# Patient Record
Sex: MALE | Race: WHITE | Employment: OTHER | URBAN - METROPOLITAN AREA
[De-identification: names, ages, dates, MRNs, and addresses within clinical notes are randomized per-mention and may not be internally consistent; named-entity substitution may affect disease eponyms.]

---

## 2017-01-05 NOTE — PROGRESS NOTES
64 y.o. WHITE OR  male who presents for RPE. His wife is here with him today    He seems to be doing very well. Denies any cardiovascular complaints. Stopped working and he's going to [employer] 1-2x/week and walking, doing the machines, etc.  Weight is creeping up though, could do better w portions     Vitals 1/9/2017 10/18/2016 12/29/2015 12/24/2015 12/8/2015   Weight 203 lb 3.2 oz 197 lb 12.8 oz  192 lb 198 lb     Vitals 11/13/2015 10/7/2015 10/27/2014   Weight 198 lb 3.2 oz 190 lb 190 lb     Denies polyuria, polydipsia, nocturia, vision change. Not checking sugars at this time. No  complaints. He has been having fairly frequent heartburn the last couple weeks, using antacid prn. No alarm complaints. Remains on mobic just about daily for his aches.   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 Phyllis Hughes liver 11/00     Fib-4 was 0.54 from 10/14    GERD (gastroesophageal reflux disease)      and HH on UGI 11/00    Hip bursitis      Dr Dayron Castellanos left 2015    Prediabetes 2011     Rhinophyma     S/P cardiac cath 2000     nl, EF 60% Dr. Monty Love Umbilical hernia      Past Surgical History   Procedure Laterality Date    Hx colonoscopy       Dr. Yessenia Mojica 10/10 diverticulosis    Hx orthopaedic  2012     Dr. 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[employer]      Social History Main Topics    Smoking status: Former Smoker     Quit date: 1/1/2005    Smokeless tobacco: Never Used      Comment: 60+ py    Alcohol use No    Drug use: No    Sexual activity: Not on file     Other Topics Concern    Not on file     Social History Narrative Family History   Problem Relation Age of Onset    Other Father      cardiomyopathy    Heart Disease Brother      Immunization History   Administered Date(s) Administered    Influenza Vaccine 12/17/2012    Influenza Vaccine (Quad) PF 10/07/2015, 10/18/2016    Influenza Vaccine PF 10/25/2013, 10/27/2014    Influenza Vaccine Whole 12/19/2011    TD Vaccine 07/01/2005    Tdap 01/09/2017    Zoster Vaccine, Live 11/06/2013     Current Outpatient Prescriptions   Medication Sig    omeprazole (PRILOSEC) 40 mg capsule Take 1 Cap by mouth daily.  celecoxib (CELEBREX) 200 mg capsule Take 1 Cap by mouth daily.  ascorbic acid (VITAMIN C) 500 mg tablet Take  by mouth.  multivitamin (ONE A DAY) tablet Take 1 Tab by mouth daily.  cyanocobalamin 1,000 mcg tablet Take 1,000 mcg by mouth daily.  diclofenac (VOLTAREN) 1 % topical gel Apply 4 grams to affected joint up to 4 times per day, maximum 16 grams per joint per day  Dispense 5 100 gram tubes     No current facility-administered medications for this visit.       Allergies   Allergen Reactions    Cymbalta [Duloxetine] Other (comments)     [place] funny       REVIEW OF SYSTEMS: colo 10/10 Dr Barrios Player no vision change or eye pain  Oral  no mouth pain, tongue or tooth problems  Ears  no hearing loss, ear pain, fullness, no swallowing problems  Cardiac  no CP, PND, orthopnea, edema, palpitations or syncope  Chest  no breast masses  Resp  no wheezing, chronic coughing, dyspnea  GI  no nausea, vomiting, change in bowel habits, bleeding, hemorrhoids  Urinary  no dysuria, hematuria, flank pain, urgency, frequency  Genitals  no genital lesions, discharge, masses, ulceration, warts  Ortho  no swelling, dec ROM, myalgias  Derm  no nail abnormalities, rashes, lesions of note, hair loss  Psych  denies any anxiety or depression symptoms, no hallucinations or violent ideation  Constitutional  no wt loss, night sweats, unexplained fevers  Neuro  no focal weakness, numbness, paresthesias, incoordination, ataxia, involuntary movements  Endo - no polyuria, polydipsia, nocturia, hot flashes    Visit Vitals    /90 (BP 1 Location: Left arm, BP Patient Position: Sitting)    Pulse 68    Temp 97.7 °F (36.5 °C)    Ht 5' 10\" (1.778 m)    Wt 203 lb 3.2 oz (92.2 kg)    BMI 29.16 kg/m2     Affect is appropriate. Mood stable  No apparent distress  HEENT --Anicteric sclerae, tympanic membranes normal,  ear canals normal.  PERRL, EOMI, conjunctiva and lids normal.  Disks were sharp  Sinuses were nontender, turbinates normal, hearing normal.  Oropharynx without  erythema, normal tongue, oral mucosa and tonsils. No thyromegaly, JVD, or bruits. Lungs --Clear to auscultation and percussion, normal percussion. Heart --Regular rate and rhythm, no murmurs, rubs, gallops, or clicks. Chest wall --Nontender to palpation. PMI normal.  Abdomen -- Soft and nontender, no hepatosplenomegaly or masses. Umbilical hernia noted    -- normal penis, no scrotal masses, testicular tenderness or hernias  Prostate  -- no asymmetry, nodularity, tenderness or enlargement  Rectal  -- normal tone, guiaiac negative brown stool  Extremities -- Without cyanosis, clubbing, edema. 2+ pulses equally and bilaterally.     LABS  From 12/10 showed gluc 118, cr 1.10,             alt 51, hba1c 6.1,       wbc 7.9, hb 16.0, plt 261, psa 1.2, ua neg  From 7/11 showed                                         2hr GTT 58  From 5/12 showed   gluc 114, cr 1.10, gfr>60,                       wbc 7.0, hb 15.2, plt 240  From 10/13 showed gluc 111, cr 0.92, gfr 91,  alt 30,          chol 169, tg 47, hdl 51, ldl-c 109, wbc 5.8, hb 15.2, plt 226, psa 1.2  From 10/14 showed gluc 102, cr 1.04, gfr>60, alt 31,          chol 177, tg 97, hdl 45, ldl-c 113, wbc 7.0, hb 15.5, plt 289, k 5.9  From 10/14 showed        hba1c 6.3  From 12/15 showed gluc 110, cr 1.17, gfr>60, alt 52, hba1c 6.2, chol 179, tg 79, hdl 53, ldl-c 110, wbc 5.8, hb 16.6, plt 244, ua neg    Results for orders placed or performed during the hospital encounter of 12/30/16   CBC W/O DIFF   Result Value Ref Range    WBC 6.6 4.6 - 13.2 K/uL    RBC 4.95 4.70 - 5.50 M/uL    HGB 16.0 13.0 - 16.0 g/dL    HCT 47.8 36.0 - 48.0 %    MCV 96.6 74.0 - 97.0 FL    MCH 32.3 24.0 - 34.0 PG    MCHC 33.5 31.0 - 37.0 g/dL    RDW 13.1 11.6 - 14.5 %    PLATELET 179 869 - 584 K/uL    MPV 9.5 9.2 - 11.8 FL   LIPID PANEL   Result Value Ref Range    LIPID PROFILE          Cholesterol, total 165 <200 MG/DL    Triglyceride 177 (H) <150 MG/DL    HDL Cholesterol 48 40 - 60 MG/DL    LDL, calculated 81.6 0 - 100 MG/DL    VLDL, calculated 35.4 MG/DL    CHOL/HDL Ratio 3.4 0 - 5.0     METABOLIC PANEL, COMPREHENSIVE   Result Value Ref Range    Sodium 140 136 - 145 mmol/L    Potassium 4.5 3.5 - 5.5 mmol/L    Chloride 107 100 - 108 mmol/L    CO2 27 21 - 32 mmol/L    Anion gap 6 3.0 - 18 mmol/L    Glucose 116 (H) 74 - 99 mg/dL    BUN 26 (H) 7.0 - 18 MG/DL    Creatinine 0.98 0.6 - 1.3 MG/DL    BUN/Creatinine ratio 27 (H) 12 - 20      GFR est AA >60 >60 ml/min/1.73m2    GFR est non-AA >60 >60 ml/min/1.73m2    Calcium 8.7 8.5 - 10.1 MG/DL    Bilirubin, total 0.4 0.2 - 1.0 MG/DL    ALT 67 (H) 16 - 61 U/L    AST 27 15 - 37 U/L    Alk.  phosphatase 80 45 - 117 U/L    Protein, total 6.9 6.4 - 8.2 g/dL    Albumin 4.2 3.4 - 5.0 g/dL    Globulin 2.7 2.0 - 4.0 g/dL    A-G Ratio 1.6 0.8 - 1.7     URINALYSIS W/ RFLX MICROSCOPIC   Result Value Ref Range    Color YELLOW      Appearance CLEAR      Specific gravity 1.025 1.005 - 1.030      pH (UA) 6.5 5.0 - 8.0      Protein NEGATIVE  NEG mg/dL    Glucose NEGATIVE  NEG mg/dL    Ketone NEGATIVE  NEG mg/dL    Bilirubin NEGATIVE  NEG      Blood NEGATIVE  NEG      Urobilinogen 1.0 0.2 - 1.0 EU/dL    Nitrites NEGATIVE  NEG      Leukocyte Esterase NEGATIVE  NEG       Calculated 10 year risk score was 8.4%    Patient Active Problem List   Diagnosis Code    Prediabetes 2011 R73.03    Arthritis Dr Amparo Salomon M19.90    GERD without esophagitis K21.9    Diverticulosis of colon without hemorrhage K57.30     Assessment and plan:  1. GERD and dyspepsia. Indefinite ppi w the dyspepsia  2. Ortho. Will change to celebrex for gi protection, ortho as needed  3. Diverticulosis. Fiber, colo 2020  4. Prediabetes. Continue lifestyle and dietary measures. Previous long discussion about typical progression of preDM. Wt loss  5. Immunizations. Tdap given  6. Lipids. Lifestyle and dietary modification, mediterranean diet sheet already given to his wife earlier today        RPE 1/18    Above conditions discussed at length and patient vocalized understanding.   All questions answered to patient satifaction

## 2017-01-09 ENCOUNTER — HOSPITAL ENCOUNTER (OUTPATIENT)
Dept: LAB | Age: 62
Discharge: HOME | End: 2017-01-09
Payer: COMMERCIAL

## 2017-01-09 ENCOUNTER — OFFICE VISIT (OUTPATIENT)
Dept: INTERNAL MEDICINE CLINIC | Age: 62
End: 2017-01-09

## 2017-01-09 VITALS
SYSTOLIC BLOOD PRESSURE: 130 MMHG | HEART RATE: 68 BPM | TEMPERATURE: 97.7 F | HEIGHT: 70 IN | BODY MASS INDEX: 29.09 KG/M2 | WEIGHT: 203.2 LBS | DIASTOLIC BLOOD PRESSURE: 90 MMHG

## 2017-01-09 DIAGNOSIS — Z00.00 PHYSICAL EXAM: Primary | ICD-10-CM

## 2017-01-09 DIAGNOSIS — R73.03 PREDIABETES: ICD-10-CM

## 2017-01-09 DIAGNOSIS — K57.30 DIVERTICULOSIS OF COLON WITHOUT HEMORRHAGE: ICD-10-CM

## 2017-01-09 DIAGNOSIS — K21.9 GERD WITHOUT ESOPHAGITIS: ICD-10-CM

## 2017-01-09 DIAGNOSIS — Z00.00 PHYSICAL EXAM: ICD-10-CM

## 2017-01-09 DIAGNOSIS — Z23 ENCOUNTER FOR IMMUNIZATION: ICD-10-CM

## 2017-01-09 DIAGNOSIS — M19.90 ARTHRITIS: ICD-10-CM

## 2017-01-09 LAB
EST. AVERAGE GLUCOSE BLD GHB EST-MCNC: 134 MG/DL
HBA1C MFR BLD: 6.3 % (ref 4.2–5.6)

## 2017-01-09 PROCEDURE — 83036 HEMOGLOBIN GLYCOSYLATED A1C: CPT | Performed by: INTERNAL MEDICINE

## 2017-01-09 RX ORDER — CELECOXIB 200 MG/1
200 CAPSULE ORAL DAILY
Qty: 90 CAP | Refills: 3 | Status: SHIPPED | OUTPATIENT
Start: 2017-01-09 | End: 2018-01-13 | Stop reason: SDUPTHER

## 2017-01-09 RX ORDER — OMEPRAZOLE 40 MG/1
40 CAPSULE, DELAYED RELEASE ORAL DAILY
Qty: 90 CAP | Refills: 3 | Status: SHIPPED | OUTPATIENT
Start: 2017-01-09 | End: 2018-01-13 | Stop reason: SDUPTHER

## 2017-01-09 NOTE — MR AVS SNAPSHOT
Visit Information Date & Time Provider Department Dept. Phone Encounter #  
 1/9/2017 10:30 AM Ita Do MD [facility] [phone] [id number] Your Appointments 1/9/2017 10:30 AM  
PHYSICAL with Ita Do MD  
[facility] of [facility]) Appt Note: [address]; [address]  
 [address] [facility] [place], [address] [address] [address] [place]  
  
   
 [address] [facility], [address] Upcoming Health Maintenance Date Due DTaP/Tdap/Td series (1 - Tdap) 7/2/2005 COLONOSCOPY 10/25/2020 Allergies as of 1/9/2017  Review Complete On: 1/9/2017 By: Mica Godfrey No Known Allergies Current Immunizations  Reviewed on 10/18/2016 Name Date Influenza Vaccine 12/17/2012 Influenza Vaccine (Quad) PF 10/18/2016, 10/7/2015 Influenza Vaccine PF 10/27/2014, 10/25/2013 Influenza Vaccine Whole 12/19/2011 TD Vaccine 7/1/2005 Zoster Vaccine, Live 11/6/2013  4:10 PM  
  
 Not reviewed this visit Vitals BP Pulse Temp Height(growth percentile) Weight(growth percentile) BMI  
 130/90 (BP 1 Location: Left arm, BP Patient Position: Sitting) 68 97.7 °F (36.5 °C) 5' 10\" (1.778 m) 203 lb 3.2 oz (92.2 kg) 29.16 kg/m2 Smoking Status Former Smoker Vitals History BMI and BSA Data Body Mass Index Body Surface Area  
 29.16 kg/m 2 2.13 m 2 Preferred Pharmacy Pharmacy Name Phone [facility] [address], [address] [place] [address] Road [phone] Your Updated Medication List  
  
   
This list is accurate as of: 1/9/17 10:07 AM.  Always use your most recent med list.  
  
  
  
  
 ascorbic acid (vitamin C) 500 mg tablet Commonly known as:  VITAMIN C Take  by mouth.  
  
 cyanocobalamin 1,000 mcg tablet Take 1,000 mcg by mouth daily. diclofenac 1 % Gel Commonly known as:  VOLTAREN  
 Apply 4 grams to affected joint up to 4 times per day, maximum 16 grams per joint per day Dispense 5 100 gram tubes DULoxetine 30 mg capsule Commonly known as:  CYMBALTA Take 1 Cap by mouth daily. meloxicam 15 mg tablet Commonly known as:  MOBIC  
TAKE ONE TABLET BY MOUTH DAILY AS NEEDED  
  
 multivitamin tablet Commonly known as:  ONE A DAY Take 1 Tab by mouth daily. Introducing [facility]! Harry Suarez introduces [employer] patient portal. Now you can access parts of your medical record, email your doctor's office, and request medication refills online. 1. In your internet browser, go to [email or web address] 2. Click on the First Time User? Click Here link in the Sign In box. You will see the New Member Sign Up page. 3. Enter your [employer] Access Code exactly as it appears below. You will not need to use this code after youve completed the sign-up process. If you do not sign up before the expiration date, you must request a new code. · [employer] Access Code: [email or web address] Expires: 1/16/2017  1:11 PM 
 
4. Enter the last four digits of your Social Security Number (xxxx) and Date of Birth (mm/dd/yyyy) as indicated and click Submit. You will be taken to the next sign-up page. 5. Create a [employer] ID. This will be your [employer] login ID and cannot be changed, so think of one that is secure and easy to remember. 6. Create a [employer] password. You can change your password at any time. 7. Enter your Password Reset Question and Answer. This can be used at a later time if you forget your password. 8. Enter your e-mail address. You will receive e-mail notification when new information is available in [address] 9. Click Sign Up. You can now view and download portions of your medical record. 10. Click the Download Summary menu link to download a portable copy of your medical information. If you have questions, please visit the Frequently Asked Questions section of the [employer] website. Remember, [employer] is NOT to be used for urgent needs. For medical emergencies, dial 911. Now available from your iPhone and Android! Please provide this summary of care documentation to your next provider. Your primary care clinician is listed as Tiana Chinchilla. If you have any questions after today's visit, please call [phone].

## 2017-01-25 ENCOUNTER — TELEPHONE (OUTPATIENT)
Dept: INTERNAL MEDICINE CLINIC | Age: 62
End: 2017-01-25

## 2017-01-25 NOTE — TELEPHONE ENCOUNTER
Pt called and stated tht you were supposed to update a letter from 10/18/16 and change the dates, he would like to  asap, RD

## 2017-04-24 ENCOUNTER — TELEPHONE (OUTPATIENT)
Dept: INTERNAL MEDICINE CLINIC | Age: 62
End: 2017-04-24

## 2017-04-24 DIAGNOSIS — K42.9 UMBILICAL HERNIA WITHOUT OBSTRUCTION AND WITHOUT GANGRENE: Primary | ICD-10-CM

## 2017-04-24 NOTE — TELEPHONE ENCOUNTER
PT needs an updated letter for his work. The letter should be for May, June and July to be out of work. Call when ready to .

## 2017-07-25 ENCOUNTER — TELEPHONE (OUTPATIENT)
Dept: INTERNAL MEDICINE CLINIC | Age: 62
End: 2017-07-25

## 2017-07-25 NOTE — TELEPHONE ENCOUNTER
Milo Pascal is calling requesting an updated letter for Mr Milo Pascal to cover his sick leave for the next 3 months starting 8/3/17.

## 2017-07-31 NOTE — TELEPHONE ENCOUNTER
Pt spouse is calling back to see if that letter could be completed today, because it must be turned in tomorrow or Wednesday.

## 2017-10-25 ENCOUNTER — TELEPHONE (OUTPATIENT)
Dept: INTERNAL MEDICINE CLINIC | Age: 62
End: 2017-10-25

## 2017-10-25 NOTE — TELEPHONE ENCOUNTER
Patient wife calling to see if the letter on file for her  can be updated with the dates Nov 2nd-Feb 2nd.      Pls Advise

## 2018-01-12 ENCOUNTER — HOSPITAL ENCOUNTER (OUTPATIENT)
Dept: LAB | Age: 63
Discharge: HOME | End: 2018-01-12
Payer: COMMERCIAL

## 2018-01-12 LAB
ALBUMIN SERPL-MCNC: 4.1 G/DL (ref 3.4–5)
ALBUMIN/GLOB SERPL: 1.5 {RATIO} (ref 0.8–1.7)
ALP SERPL-CCNC: 75 U/L (ref 45–117)
ALT SERPL-CCNC: 64 U/L (ref 16–61)
ANION GAP SERPL CALC-SCNC: 6 MMOL/L (ref 3–18)
AST SERPL-CCNC: 22 U/L (ref 15–37)
BILIRUB SERPL-MCNC: 0.6 MG/DL (ref 0.2–1)
BUN SERPL-MCNC: 23 MG/DL (ref 7–18)
BUN/CREAT SERPL: 24 (ref 12–20)
CALCIUM SERPL-MCNC: 8.8 MG/DL (ref 8.5–10.1)
CHLORIDE SERPL-SCNC: 109 MMOL/L (ref 100–108)
CHOLEST SERPL-MCNC: 128 MG/DL
CO2 SERPL-SCNC: 28 MMOL/L (ref 21–32)
CREAT SERPL-MCNC: 0.95 MG/DL (ref 0.6–1.3)
ERYTHROCYTE [DISTWIDTH] IN BLOOD BY AUTOMATED COUNT: 13.6 % (ref 11.6–14.5)
EST. AVERAGE GLUCOSE BLD GHB EST-MCNC: 128 MG/DL
GLOBULIN SER CALC-MCNC: 2.7 G/DL (ref 2–4)
GLUCOSE SERPL-MCNC: 109 MG/DL (ref 74–99)
HBA1C MFR BLD: 6.1 % (ref 4.2–5.6)
HCT VFR BLD AUTO: 47.7 % (ref 36–48)
HDLC SERPL-MCNC: 44 MG/DL (ref 40–60)
HDLC SERPL: 2.9 {RATIO} (ref 0–5)
HGB BLD-MCNC: 15.7 G/DL (ref 13–16)
LDLC SERPL CALC-MCNC: 63.4 MG/DL (ref 0–100)
LIPID PROFILE,FLP: NORMAL
MCH RBC QN AUTO: 32 PG (ref 24–34)
MCHC RBC AUTO-ENTMCNC: 32.9 G/DL (ref 31–37)
MCV RBC AUTO: 97.3 FL (ref 74–97)
PLATELET # BLD AUTO: 238 K/UL (ref 135–420)
PMV BLD AUTO: 9.8 FL (ref 9.2–11.8)
POTASSIUM SERPL-SCNC: 5.1 MMOL/L (ref 3.5–5.5)
PROT SERPL-MCNC: 6.8 G/DL (ref 6.4–8.2)
RBC # BLD AUTO: 4.9 M/UL (ref 4.7–5.5)
SODIUM SERPL-SCNC: 143 MMOL/L (ref 136–145)
TRIGL SERPL-MCNC: 103 MG/DL (ref ?–150)
TSH SERPL DL<=0.05 MIU/L-ACNC: 1.39 UIU/ML (ref 0.36–3.74)
VLDLC SERPL CALC-MCNC: 20.6 MG/DL
WBC # BLD AUTO: 6.8 K/UL (ref 4.6–13.2)

## 2018-01-12 PROCEDURE — 36415 COLL VENOUS BLD VENIPUNCTURE: CPT | Performed by: INTERNAL MEDICINE

## 2018-01-12 PROCEDURE — 83036 HEMOGLOBIN GLYCOSYLATED A1C: CPT | Performed by: INTERNAL MEDICINE

## 2018-01-12 PROCEDURE — 85027 COMPLETE CBC AUTOMATED: CPT | Performed by: INTERNAL MEDICINE

## 2018-01-12 PROCEDURE — 80061 LIPID PANEL: CPT | Performed by: INTERNAL MEDICINE

## 2018-01-12 PROCEDURE — 84443 ASSAY THYROID STIM HORMONE: CPT | Performed by: INTERNAL MEDICINE

## 2018-01-12 PROCEDURE — 80053 COMPREHEN METABOLIC PANEL: CPT | Performed by: INTERNAL MEDICINE

## 2018-01-12 PROCEDURE — 82306 VITAMIN D 25 HYDROXY: CPT | Performed by: INTERNAL MEDICINE

## 2018-01-13 DIAGNOSIS — K21.9 GERD WITHOUT ESOPHAGITIS: ICD-10-CM

## 2018-01-13 DIAGNOSIS — M19.90 ARTHRITIS: ICD-10-CM

## 2018-01-13 LAB — 25(OH)D3 SERPL-MCNC: 33.9 NG/ML (ref 30–100)

## 2018-01-14 RX ORDER — OMEPRAZOLE 40 MG/1
CAPSULE, DELAYED RELEASE ORAL
Qty: 90 CAP | Refills: 2 | Status: SHIPPED | OUTPATIENT
Start: 2018-01-14 | End: 2018-10-23 | Stop reason: SDUPTHER

## 2018-01-14 RX ORDER — CELECOXIB 200 MG/1
CAPSULE ORAL
Qty: 90 CAP | Refills: 2 | Status: SHIPPED | OUTPATIENT
Start: 2018-01-14 | End: 2018-11-12 | Stop reason: SDUPTHER

## 2018-01-15 NOTE — PROGRESS NOTES
58 y.o. WHITE OR  male who presents for RPE. His wife is here with him today    Denies any cardiovascular complaints. He finally retired earlier this month and he's going to the [facility] 1-2x/week and walking, doing the machines, etc.  He has not noted elev bp readings although readings the last several visits in the office are elev per new ACC/AHA recs. No sx however    Denies polyuria, polydipsia, nocturia, vision change. Not checking sugars at this time. No success with attempts at wt loss    Vitals 1/19/2018 1/9/2017 10/18/2016 12/29/2015 12/24/2015   Weight 203 lb 203 lb 3.2 oz 197 lb 12.8 oz  192 lb     No  complaints. The gerd is controlled on ppi. He has not tried weaning off but not really doing avoidance measures either    Remains oncelebrex just about daily for his aches. He's been having severe pain in the right hip and ant thigh. Getting up from sitting position is worst, sometimes he just has to stand there a few moments before he gets mobile. Interestingly, the left knee and hpi issues are better after shots from Dr Gunnar Torres. He has not gone for the right leg.  No sciatica sx    Past Medical History:   Diagnosis Date    Arthritis     left knee Dr Gunnar Torres 2015    Bright's disease     as a child    Diverticulosis 10/10    Dr. Browning Been liver 11/00    Fib-4 was 0.54 from 10/14    GERD (gastroesophageal reflux disease)     and HH on UGI 11/00    Hip bursitis     Dr Gunnar Torres left 2015    Prediabetes 2011     Rhinophyma     S/P cardiac cath 2000    nl, EF 60% Dr. Kailee Power Umbilical hernia      Past Surgical History:   Procedure Laterality Date    HX COLONOSCOPY      Dr. Phil Meehan 10/10 diverticulosis    HX ORTHOPAEDIC  2012    Dr. 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    Smoking status: Former Smoker     Quit date: 1/1/2005    Smokeless tobacco: Never Used      Comment: 60+ py    Alcohol use No    Drug use: No    Sexual activity: Not on file     Other Topics Concern    Not on file     Social History Narrative     Family History   Problem Relation Age of Onset    Other Father      cardiomyopathy    Heart Disease Brother      Immunization History   Administered Date(s) Administered    Influenza Vaccine 12/17/2012, 10/01/2017    Influenza Vaccine (Quad) PF 10/07/2015, 10/18/2016    Influenza Vaccine PF 10/25/2013, 10/27/2014    Influenza Vaccine Whole 12/19/2011    TD Vaccine 07/01/2005    Tdap 01/09/2017    Zoster Vaccine, Live 11/06/2013     Current Outpatient Prescriptions   Medication Sig    cholecalciferol (VITAMIN D3) 1,000 unit tablet Take 2,500 Units by mouth daily.  CALCIUM CARBONATE (CALCIUM 600 PO) Take  by mouth.  Omega-3 Fatty Acids (FISH OIL) 500 mg cap Take 12,200 mg by mouth.  magnesium 200 mg tab Take 400 mg by mouth.  chromium picolinate 1,000 mcg tablet Take 1 Tab by mouth daily.  OTHER     celecoxib (CELEBREX) 200 mg capsule TAKE ONE CAPSULE BY MOUTH DAILY    omeprazole (PRILOSEC) 40 mg capsule TAKE ONE CAPSULE BY MOUTH DAILY    ascorbic acid (VITAMIN C) 500 mg tablet Take  by mouth.  diclofenac (VOLTAREN) 1 % topical gel Apply 4 grams to affected joint up to 4 times per day, maximum 16 grams per joint per day  Dispense 5 100 gram tubes    multivitamin (ONE A DAY) tablet Take 1 Tab by mouth daily.  cyanocobalamin 1,000 mcg tablet Take 1,000 mcg by mouth daily. No current facility-administered medications for this visit.       Allergies   Allergen Reactions    Cymbalta [Duloxetine] Other (comments)     Felt funny     REVIEW OF SYSTEMS: colo 10/10 Dr Bach Favorite no vision change or eye pain  Oral  no mouth pain, tongue or tooth problems  Ears  no hearing loss, ear pain, fullness, no swallowing problems  Cardiac  no CP, PND, orthopnea, edema, palpitations or syncope  Chest  no breast masses  Resp  no wheezing, chronic coughing, dyspnea  GI  no nausea, vomiting, change in bowel habits, bleeding, hemorrhoids  Urinary  no dysuria, hematuria, flank pain, urgency, frequency  Genitals  no genital lesions, discharge, masses, ulceration, warts  Ortho  no swelling, dec ROM, myalgias  Derm  no nail abnormalities, rashes, lesions of note, hair loss  Psych  denies any anxiety or depression symptoms, no hallucinations or violent ideation  Constitutional  no wt loss, night sweats, unexplained fevers  Neuro  no focal weakness, numbness, paresthesias, incoordination, ataxia, involuntary movements  Endo - no polyuria, polydipsia, nocturia, hot flashes    Visit Vitals    BP (!) 162/98 (BP 1 Location: Left arm, BP Patient Position: Sitting)    Pulse 62    Temp 97.9 °F (36.6 °C) (Oral)    Resp 14    Ht 5' 10\" (1.778 m)    Wt 203 lb (92.1 kg)    SpO2 98%    BMI 29.13 kg/m2   repeat bp 156/92  Affect is appropriate. Mood stable  No apparent distress  HEENT --Anicteric sclerae, tympanic membranes normal,  ear canals normal.  PERRL, EOMI, conjunctiva and lids normal.  Disks were sharp  Sinuses were nontender, turbinates normal, hearing normal.  Oropharynx without  erythema, normal tongue, oral mucosa and tonsils. No thyromegaly, JVD, or bruits. Lungs --Clear to auscultation and percussion, normal percussion. Heart --Regular rate and rhythm, no murmurs, rubs, gallops, or clicks. Chest wall --Nontender to palpation. PMI normal.  Abdomen -- Soft and nontender, no hepatosplenomegaly or masses. Umbilical hernia noted  Prostate  -- no asymmetry, nodularity, tenderness or enlargement  Rectal  -- normal tone, guiaiac negative brown stool  Extremities -- Without cyanosis, clubbing, edema. 2+ pulses equally and bilaterally.     LABS  From 12/10 showed gluc 118, cr 1.10,             alt 51, hba1c 6.1, wbc 7.9, hb 16.0, plt 261, psa 1.2, ua neg  From 7/11 showed                                         2hr GTT 58  From 5/12 showed   gluc 114, cr 1.10, gfr>60,                       wbc 7.0, hb 15.2, plt 240  From 10/13 showed gluc 111, cr 0.92, gfr 91,  alt 30,          chol 169, tg 47, hdl 51, ldl-c 109, wbc 5.8, hb 15.2, plt 226, psa 1.2  From 10/14 showed gluc 102, cr 1.04, gfr>60, alt 31,          chol 177, tg 97, hdl 45, ldl-c 113, wbc 7.0, hb 15.5, plt 289, k 5.9  From 10/14 showed        hba1c 6.3  From 12/15 showed gluc 110, cr 1.17, gfr>60, alt 52, hba1c 6.2, chol 179, tg 79, hdl 53, ldl-c 110, wbc 5.8, hb 16.6, plt 244, ua neg  From 1/17 showed   gluc 116, cr 0.98, gfr>60, alt 67,          chol 165, tg 177, hdl 48, ldl-c 82, wbc 6.6, hb 16.0, plt 229, ua neg    Results for orders placed or performed during the hospital encounter of 01/12/18   CBC W/O DIFF   Result Value Ref Range    WBC 6.8 4.6 - 13.2 K/uL    RBC 4.90 4.70 - 5.50 M/uL    HGB 15.7 13.0 - 16.0 g/dL    HCT 47.7 36.0 - 48.0 %    MCV 97.3 (H) 74.0 - 97.0 FL    MCH 32.0 24.0 - 34.0 PG    MCHC 32.9 31.0 - 37.0 g/dL    RDW 13.6 11.6 - 14.5 %    PLATELET 318 027 - 276 K/uL    MPV 9.8 9.2 - 11.8 FL   LIPID PANEL   Result Value Ref Range    LIPID PROFILE          Cholesterol, total 128 <200 MG/DL    Triglyceride 103 <150 MG/DL    HDL Cholesterol 44 40 - 60 MG/DL    LDL, calculated 63.4 0 - 100 MG/DL    VLDL, calculated 20.6 MG/DL    CHOL/HDL Ratio 2.9 0 - 5.0     METABOLIC PANEL, COMPREHENSIVE   Result Value Ref Range    Sodium 143 136 - 145 mmol/L    Potassium 5.1 3.5 - 5.5 mmol/L    Chloride 109 (H) 100 - 108 mmol/L    CO2 28 21 - 32 mmol/L    Anion gap 6 3.0 - 18 mmol/L    Glucose 109 (H) 74 - 99 mg/dL    BUN 23 (H) 7.0 - 18 MG/DL    Creatinine 0.95 0.6 - 1.3 MG/DL    BUN/Creatinine ratio 24 (H) 12 - 20      GFR est AA >60 >60 ml/min/1.73m2    GFR est non-AA >60 >60 ml/min/1.73m2    Calcium 8.8 8.5 - 10.1 MG/DL    Bilirubin, total 0.6 0.2 - 1.0 MG/DL    ALT (SGPT) 64 (H) 16 - 61 U/L    AST (SGOT) 22 15 - 37 U/L    Alk. phosphatase 75 45 - 117 U/L    Protein, total 6.8 6.4 - 8.2 g/dL    Albumin 4.1 3.4 - 5.0 g/dL    Globulin 2.7 2.0 - 4.0 g/dL    A-G Ratio 1.5 0.8 - 1.7     TSH 3RD GENERATION   Result Value Ref Range    TSH 1.39 0.36 - 3.74 uIU/mL   VITAMIN D, 25 HYDROXY   Result Value Ref Range    Vitamin D 25-Hydroxy 33.9 30 - 100 ng/mL   HEMOGLOBIN A1C WITH EAG   Result Value Ref Range    Hemoglobin A1c 6.1 (H) 4.2 - 5.6 %    Est. average glucose 128 mg/dL     Calculated 10 year risk score not calculable    Patient Active Problem List   Diagnosis Code    Prediabetes 2011 R73.03    Arthritis Dr Fifi Cano M19.90    GERD without esophagitis K21.9    Diverticulosis of colon without hemorrhage K57.30    Hyperlipidemia E78.5    Primary hypertension I10     Assessment and plan:  1. GERD. Continue current, reinforced avoidance measures  2. Ortho. Will change to celebrex for gi protection, ortho as needed  3. Diverticulosis. Fiber, colo 2020  4. Prediabetes. Continue lifestyle and dietary measures. Previous long discussion about typical progression of preDM. Wt loss again reiterated  5. Hip pain. Check films. Refer back to Dr Fifi Cano  6. Lipids. Lifestyle and dietary modification  7. Probable htn. Declined meds today, he will redouble his efforts at lifestyle and dietary measures, wt loss, follow bp outside, call if he changes his mind. Otherwise, will see back in a few months          RTC 5/18    Above conditions discussed at length and patient vocalized understanding.   All questions answered to patient satifaction

## 2018-01-19 ENCOUNTER — OFFICE VISIT (OUTPATIENT)
Dept: INTERNAL MEDICINE CLINIC | Age: 63
End: 2018-01-19

## 2018-01-19 DIAGNOSIS — E78.5 HYPERLIPIDEMIA, UNSPECIFIED HYPERLIPIDEMIA TYPE: ICD-10-CM

## 2018-01-19 DIAGNOSIS — M19.90 ARTHRITIS: ICD-10-CM

## 2018-01-19 DIAGNOSIS — M79.651 RIGHT THIGH PAIN: ICD-10-CM

## 2018-01-19 DIAGNOSIS — M25.551 RIGHT HIP PAIN: ICD-10-CM

## 2018-01-19 DIAGNOSIS — K57.30 DIVERTICULOSIS OF COLON WITHOUT HEMORRHAGE: ICD-10-CM

## 2018-01-19 DIAGNOSIS — I10 PRIMARY HYPERTENSION: ICD-10-CM

## 2018-01-19 DIAGNOSIS — Z00.00 PHYSICAL EXAM: Primary | ICD-10-CM

## 2018-01-19 DIAGNOSIS — R73.03 PREDIABETES: ICD-10-CM

## 2018-01-19 DIAGNOSIS — K21.9 GERD WITHOUT ESOPHAGITIS: ICD-10-CM

## 2018-01-19 RX ORDER — CALCIUM CARB/VITAMIN D3/VIT K1 500-500-40
1 TABLET,CHEWABLE ORAL DAILY
COMMUNITY
End: 2019-02-12

## 2018-01-19 RX ORDER — MELATONIN
2500 DAILY
COMMUNITY

## 2018-01-19 RX ORDER — MAGNESIUM 200 MG
400 TABLET ORAL
COMMUNITY
End: 2019-02-25

## 2018-01-19 NOTE — PROGRESS NOTES
1. Have you been to the ER, urgent care clinic or hospitalized since your last visit? NO.     2. Have you seen or consulted any other health care providers outside of the [address] [place] [address] since your last visit (Include any pap smears or colon screening)? NO      Do you have an Advanced Directive? NO    Would you like information on Advanced Directives? YES

## 2018-01-19 NOTE — MR AVS SNAPSHOT
[address] [facility] 
 
 
 [address], [facility] 200 [facility] 
[phone] Patient: Orville Marsh MRN: [id number] Visit Information Date & Time Provider Department Dept. Phone Encounter #  
 1/19/2018  2:00 PM Anirudh Trevino MD [facility] [address] [place] [address] [phone] [id number] Your Appointments 5/23/2018 10:40 AM  
Office Visit with Anirudh Trevino MD  
[facility] [address] [place] [address] [facility]) Appt Note: 4 month follow up to check bp no labs per pt  
 [address]  
  
   
 [address] [facility] Upcoming Health Maintenance Date Due Influenza Age 5 to Adult 8/1/2017 COLONOSCOPY 10/25/2020 DTaP/Tdap/Td series (2 - Td) 1/9/2027 Allergies as of 1/19/2018  Review Complete On: 1/19/2018 By: Julee Ruiz Severity Noted Reaction Type Reactions Cymbalta [Duloxetine]  01/09/2017    Other (comments) Felt funny Current Immunizations  Reviewed on 1/19/2018 Name Date Influenza Vaccine 10/1/2017, 12/17/2012 Influenza Vaccine (Quad) PF 10/18/2016, 10/7/2015 Influenza Vaccine PF 10/27/2014, 10/25/2013 Influenza Vaccine Whole 12/19/2011 TD Vaccine 7/1/2005 Tdap 1/9/2017 Zoster Vaccine, Live 11/6/2013  4:10 PM  
  
 Reviewed by Anirudh Trevino MD on 1/19/2018 at  2:28 PM  
Vitals BP Pulse Temp Resp Height(growth percentile) Weight(growth percentile) (!) 162/98 (BP 1 Location: Left arm, BP Patient Position: Sitting) 62 97.9 °F (36.6 °C) (Oral) 14 5' 10\" (1.778 m) 203 lb (92.1 kg) SpO2 BMI Smoking Status 98% 29.13 kg/m2 Former Smoker BMI and BSA Data Body Mass Index Body Surface Area  
 29.13 kg/m 2 2.13 m 2 Preferred Pharmacy Pharmacy Name Phone [facility] [address], [address] [phone] Your Updated Medication List  
  
   
This list is accurate as of: 1/19/18  3:02 PM.  Always use your most recent med list.  
  
  
  
  
 ascorbic acid (vitamin C) 500 mg tablet Commonly known as:  VITAMIN C Take  by mouth. CALCIUM 600 PO Take  by mouth. celecoxib 200 mg capsule Commonly known as:  CELEBREX  
TAKE ONE CAPSULE BY MOUTH DAILY chromium picolinate 1,000 mcg tablet Take 1 Tab by mouth daily. cyanocobalamin 1,000 mcg tablet Take 1,000 mcg by mouth daily. diclofenac 1 % Gel Commonly known as:  VOLTAREN Apply 4 grams to affected joint up to 4 times per day, maximum 16 grams per joint per day Dispense 5 100 gram tubes FISH  mg Cap Generic drug:  Omega-3 Fatty Acids Take 12,200 mg by mouth.  
  
 magnesium 200 mg Tab Take 400 mg by mouth.  
  
 multivitamin tablet Commonly known as:  ONE A DAY Take 1 Tab by mouth daily. omeprazole 40 mg capsule Commonly known as:  PRILOSEC  
TAKE ONE CAPSULE BY MOUTH DAILY  
  
 OTHER  
  
 VITAMIN D3 1,000 unit tablet Generic drug:  cholecalciferol Take 2,500 Units by mouth daily. Introducing [facility]! Corby Erickson introduces [employer] patient portal. Now you can access parts of your medical record, email your doctor's office, and request medication refills online. 1. In your internet browser, go to [email or web address] 2. Click on the First Time User? Click Here link in the Sign In box. You will see the New Member Sign Up page. 3. Enter your [employer] Access Code exactly as it appears below. You will not need to use this code after youve completed the sign-up process. If you do not sign up before the expiration date, you must request a new code. · [employer] Access Code: [email or web address] Expires: 4/12/2018  8:12 AM 
 
4.  Enter the last four digits of your Social Security Number (xxxx) and Date of Birth (mm/dd/yyyy) as indicated and click Submit. You will be taken to the next sign-up page. 5. Create a miacosa ID. This will be your miacosa login ID and cannot be changed, so think of one that is secure and easy to remember. 6. Create a miacosa password. You can change your password at any time. 7. Enter your Password Reset Question and Answer. This can be used at a later time if you forget your password. 8. Enter your e-mail address. You will receive e-mail notification when new information is available in [address] 9. Click Sign Up. You can now view and download portions of your medical record. 10. Click the Download Summary menu link to download a portable copy of your medical information. If you have questions, please visit the Frequently Asked Questions section of the mi[employer]osa website. Remember, mi[employer]osa is NOT to be used for urgent needs. For medical emergencies, dial 911. Now available from your iPhone and Android! Please provide this summary of care documentation to your next provider. Your primary care clinician is listed as Steven Hanks. If you have any questions after today's visit, please call [phone].

## 2018-01-20 ENCOUNTER — HOSPITAL ENCOUNTER (OUTPATIENT)
Dept: GENERAL RADIOLOGY | Age: 63
Discharge: HOME | End: 2018-01-20
Payer: COMMERCIAL

## 2018-01-20 VITALS
TEMPERATURE: 97.9 F | BODY MASS INDEX: 29.06 KG/M2 | HEIGHT: 70 IN | HEART RATE: 62 BPM | RESPIRATION RATE: 14 BRPM | DIASTOLIC BLOOD PRESSURE: 98 MMHG | WEIGHT: 203 LBS | SYSTOLIC BLOOD PRESSURE: 162 MMHG | OXYGEN SATURATION: 98 %

## 2018-01-20 DIAGNOSIS — M25.551 RIGHT HIP PAIN: ICD-10-CM

## 2018-01-20 DIAGNOSIS — M79.651 RIGHT THIGH PAIN: ICD-10-CM

## 2018-01-20 PROBLEM — E78.5 HYPERLIPIDEMIA: Status: ACTIVE | Noted: 2018-01-20

## 2018-01-20 PROBLEM — I10 PRIMARY HYPERTENSION: Status: ACTIVE | Noted: 2018-01-20

## 2018-01-20 PROCEDURE — 73502 X-RAY EXAM HIP UNI 2-3 VIEWS: CPT

## 2018-01-20 PROCEDURE — 73552 X-RAY EXAM OF FEMUR 2/>: CPT

## 2018-01-25 ENCOUNTER — TELEPHONE (OUTPATIENT)
Dept: INTERNAL MEDICINE CLINIC | Age: 63
End: 2018-01-25

## 2018-02-06 ENCOUNTER — OFFICE VISIT (OUTPATIENT)
Dept: ORTHOPEDIC SURGERY | Facility: CLINIC | Age: 63
End: 2018-02-06

## 2018-02-06 VITALS
DIASTOLIC BLOOD PRESSURE: 96 MMHG | HEART RATE: 70 BPM | OXYGEN SATURATION: 94 % | WEIGHT: 202 LBS | BODY MASS INDEX: 28.92 KG/M2 | SYSTOLIC BLOOD PRESSURE: 162 MMHG | TEMPERATURE: 95.5 F | HEIGHT: 70 IN

## 2018-02-06 DIAGNOSIS — M79.651 RIGHT THIGH PAIN: ICD-10-CM

## 2018-02-06 DIAGNOSIS — M25.551 RIGHT HIP PAIN: Primary | ICD-10-CM

## 2018-02-06 DIAGNOSIS — M16.11 PRIMARY OSTEOARTHRITIS OF RIGHT HIP: ICD-10-CM

## 2018-02-06 RX ORDER — TRAMADOL HYDROCHLORIDE 50 MG/1
50 TABLET ORAL
Qty: 21 TAB | Refills: 0 | Status: SHIPPED | OUTPATIENT
Start: 2018-02-06 | End: 2019-02-12

## 2018-02-06 NOTE — PROGRESS NOTES
[address] [place] [address]  [phone]           Patient: Vasquez Saleh                MRN: [id number]       SSN: [id number]  YOB: 1955        AGE: 58 y.o. SEX: male  Body mass index is 28.98 kg/(m^2). PCP: Dane Chavez MD  02/06/18      This office note has been dictated. REVIEW OF SYSTEMS:  Constitutional: Negative for fever, chills, weight loss and malaise/fatigue. HENT: Negative. Eyes: Negative. Respiratory: Negative. Cardiovascular: Negative. Gastrointestinal: No bowel incontinence or constipation. Genitourinary: No bladder incontinence or saddle anesthesia. Skin: Negative. Neurological: Negative. Endo/Heme/Allergies: Negative. Psychiatric/Behavioral: Negative. Musculoskeletal: As per HPI above. 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 Andrea Escobar liver 11/00    Fib-4 was 0.54 from 10/14    GERD (gastroesophageal reflux disease)     and HH on UGI 11/00    Hip bursitis     Dr Evgeny Swartz left 2015    Prediabetes 2011     Rhinophyma     S/P cardiac cath 2000    nl, EF 60% Dr. Trinidad Persons Umbilical hernia          Current Outpatient Prescriptions:     cholecalciferol (VITAMIN D3) 1,000 unit tablet, Take 2,500 Units by mouth daily. , Disp: , Rfl:     CALCIUM CARBONATE (CALCIUM 600 PO), Take  by mouth., Disp: , Rfl:     Omega-3 Fatty Acids (FISH OIL) 500 mg cap, Take 12,200 mg by mouth., Disp: , Rfl:     magnesium 200 mg tab, Take 400 mg by mouth., Disp: , Rfl:     chromium picolinate 1,000 mcg tablet, Take 1 Tab by mouth daily. , Disp: , Rfl:     OTHER, , Disp: , Rfl:     celecoxib (CELEBREX) 200 mg capsule, TAKE ONE CAPSULE BY MOUTH DAILY, Disp: 90 Cap, Rfl: 2    omeprazole (PRILOSEC) 40 mg capsule, TAKE ONE CAPSULE BY MOUTH DAILY, Disp: 90 Cap, Rfl: 2   ascorbic acid (VITAMIN C) 500 mg tablet, Take  by mouth., Disp: , Rfl:     diclofenac (VOLTAREN) 1 % topical gel, Apply 4 grams to affected joint up to 4 times per day, maximum 16 grams per joint per day Dispense 5 100 gram tubes, Disp: 100 g, Rfl: 0    multivitamin (ONE A DAY) tablet, Take 1 Tab by mouth daily. , Disp: , Rfl:     cyanocobalamin 1,000 mcg tablet, Take 1,000 mcg by mouth daily. 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 Todd Ghosh 10/10 diverticulosis    HX ORTHOPAEDIC  2012    Dr. Vi Rea left distal clavicle excision and acromioplasty             We did see Mr. Minesh Tyler today in the office for evaluation opinion and advice regarding right thigh pain times eight months without injury. Prior to eight months ago, he was doing fine. He has developed increasing right thigh pain with certain movements, i.e. putting on his shoes and socks, getting in and out of a car and in and out of bed. He does report some night discomfort. He denies any catching or locking. He denies any low back pain. He denies any change in his bowel or bladder habits. He has had x-rays of his hip and femur done already. PHYSICAL EXAMINATION:  In general, the patient is alert and oriented x 3 in no acute distress. The patient is well-developed, well-nourished, with a normal affect. The patient is afebrile. HEENT:  Head is normocephalic and atraumatic. Pupils are equally round and reactive to light and accommodation. Extraocular eye movements are intact. Neck is supple. Trachea is midline. No JVD is present. Breathing is nonlabored. Examination of the lower extremities reveals decreased range of motion of the right hip reproducing groin and thigh discomfort. There is no pain to palpation of the greater trochanteric bursae. There is negative straight leg raise with only slight discomfort to the thigh. He has pain-free range of motion of the left hip. Leg lengths look good. RADIOGRAPHS:  Review of radiographs done previously on January 20, 2018, AP and lateral of the right femur as well as AP of the pelvis and AP and frog leg lateral of the right hip, show no acute bony abnormality. He does have some shadowing of the femoral head noted. ASSESSMENT:  Right thigh pain. PLAN:  At this point, we are going to get an MRI of the right hip and thigh to rule out avascular necrosis and other abnormalities. He was given a prescription for Tramadol. He will continue with Celebrex and follow up with us after the MRI for further evaluation.                   JR Iain TRIPP, PA-C, ATC

## 2018-02-08 ENCOUNTER — HOSPITAL ENCOUNTER (OUTPATIENT)
Age: 63
Discharge: HOME | End: 2018-02-08
Attending: PHYSICIAN ASSISTANT
Payer: COMMERCIAL

## 2018-02-08 DIAGNOSIS — M25.551 RIGHT HIP PAIN: ICD-10-CM

## 2018-02-08 DIAGNOSIS — M79.651 RIGHT THIGH PAIN: ICD-10-CM

## 2018-02-08 DIAGNOSIS — M16.11 PRIMARY OSTEOARTHRITIS OF RIGHT HIP: ICD-10-CM

## 2018-02-08 PROCEDURE — 73718 MRI LOWER EXTREMITY W/O DYE: CPT

## 2018-02-08 PROCEDURE — 73721 MRI JNT OF LWR EXTRE W/O DYE: CPT

## 2018-02-15 ENCOUNTER — OFFICE VISIT (OUTPATIENT)
Dept: ORTHOPEDIC SURGERY | Age: 63
End: 2018-02-15

## 2018-02-15 VITALS
WEIGHT: 200.4 LBS | BODY MASS INDEX: 28.69 KG/M2 | SYSTOLIC BLOOD PRESSURE: 153 MMHG | HEART RATE: 69 BPM | HEIGHT: 70 IN | DIASTOLIC BLOOD PRESSURE: 91 MMHG | OXYGEN SATURATION: 92 %

## 2018-02-15 DIAGNOSIS — M89.8X5 PAIN IN RIGHT FEMUR: ICD-10-CM

## 2018-02-15 DIAGNOSIS — M17.11 PRIMARY OSTEOARTHRITIS OF RIGHT KNEE: ICD-10-CM

## 2018-02-15 DIAGNOSIS — M54.16 LUMBAR RADICULOPATHY: ICD-10-CM

## 2018-02-15 DIAGNOSIS — M25.551 RIGHT HIP PAIN: Primary | ICD-10-CM

## 2018-02-15 RX ORDER — GABAPENTIN 300 MG/1
300 CAPSULE ORAL 2 TIMES DAILY
Qty: 28 CAP | Refills: 0 | Status: SHIPPED | OUTPATIENT
Start: 2018-02-15 | End: 2018-03-01

## 2018-02-15 NOTE — PROGRESS NOTES
Patient: Angélica Cassidy                MRN: [id number]       SSN: [id number]  YOB: 1955        AGE: 58 y.o. SEX: male  Body mass index is 28.75 kg/(m^2). PCP: Elaina Grijalva MD  02/15/18  HISTORY: I had the pleasure of seeing Mr. Jerica Cruz for opinion and advice with regards to his right thigh discomfort. Dr. Mirza Arevalo, quite astutely, ordered an MRI for his hip and femur with attached capsules, and I am very appreciative that he did that. It is an excellent workup. It has been going on for over a year, and over the last six weeks, he has been helping to lift his father-in-law, who has apparently had a stroke, and that has exacerbated things to some degree. He denies fever or chills. He is otherwise feeling well. He really denies much in the way of back pain, and he describes some burning in the proximal thigh and also now going down the leg from time to time. He denies much in the way of groin pain. It does hurt to roll over on it at night. He is on some Celebrex, which I think is a good idea as well. PHYSICAL EXAMINATION:  On examination today, he actually gets up and walks fairly well. He does have a mildly antalgic gait owing to his right knee more than anything, although he denies knee pain. He does have some patellofemoral crepitus with terminal extension involving the knee itself but not severely so. There is a minimal effusion. Both hips have a free and easy range of motion and are relatively nonirritable. The low back is mildly tender today. He has some tenderness involving the hip flexor muscle on the right side. He has certainly decreased sensation to the lateral femoral cutaneous nerve that is worse on the right than on the left and reproducible. There is also some decreased sensation to L5 on the right side on the lower leg. There is no foot drop.   EHL is -5/5 on the right side and straight leg raise is just equivocal.  He is well-nourished and well-developed, and there is no history of fevers, chills, night sweats, or weight loss. RADIOGRAPHS:  I did review the MRIs. I agree with the radiologist.  They are essentially negative. IMPRESSION:  My overall impression is:    1. He has a hip flexor strain. 2. Meralgia paresthetica. 3. 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 PLAN:  I would recommend a three-phase, whole-body, technetium bone scan. I would also recommend some low-dose Neurontin starting at once a day and going up to twice a day, and finally, we are going to get an MRI of the lumbar spine. I do not think anything sinister is going on from a cancer point of view. Having said this, we will better delineate things. It has been an absolute pleasure to share in his care. REVIEW OF SYSTEMS:      CON: negative for weight loss, fever  EYE: negative for double vision  ENT: negative for hoarseness  RS:   negative for Tb  GI:    negative for blood in stool  :  negative for blood in urine  Other systems reviewed and noted below. 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 Yimi Ochoa liver 11/00    Fib-4 was 0.54 from 10/14    GERD (gastroesophageal reflux disease)     and HH on UGI 11/00    Hip bursitis     Dr Bladimir Ambrose left 2015    Prediabetes 2011     Rhinophyma     S/P cardiac cath 2000    nl, EF 60% Dr. Uriel Robledo Umbilical hernia        Family History   Problem Relation Age of Onset    Other Father      cardiomyopathy    Heart Disease Brother        Current Outpatient Prescriptions   Medication Sig Dispense Refill    gabapentin (NEURONTIN) 300 mg capsule Take 1 Cap by mouth two (2) times a day for 14 days. 28 Cap 0    cholecalciferol (VITAMIN D3) 1,000 unit tablet Take 2,500 Units by mouth daily.  CALCIUM CARBONATE (CALCIUM 600 PO) Take  by mouth.       celecoxib (CELEBREX) 200 mg capsule TAKE ONE CAPSULE BY MOUTH DAILY 90 Cap 2    omeprazole (PRILOSEC) 40 mg capsule TAKE ONE CAPSULE BY MOUTH DAILY 90 Cap 2    ascorbic acid (VITAMIN C) 500 mg tablet Take  by mouth.  multivitamin (ONE A DAY) tablet Take 1 Tab by mouth daily.  cyanocobalamin 1,000 mcg tablet Take 1,000 mcg by mouth daily.  traMADol (ULTRAM) 50 mg tablet Take 1 Tab by mouth every eight (8) hours as needed for Pain. Max Daily Amount: 150 mg. 21 Tab 0    Omega-3 Fatty Acids (FISH OIL) 500 mg cap Take 12,200 mg by mouth.  magnesium 200 mg tab Take 400 mg by mouth.  chromium picolinate 1,000 mcg tablet Take 1 Tab by mouth daily.  OTHER       diclofenac (VOLTAREN) 1 % topical gel Apply 4 grams to affected joint up to 4 times per day, maximum 16 grams per joint per day  Dispense 5 100 gram tubes 100 g 0       Allergies   Allergen Reactions    Cymbalta [Duloxetine] Other (comments)     Felt funny       Past Surgical History:   Procedure Laterality Date    HX COLONOSCOPY      Dr. Nikolas Mckoy 10/10 diverticulosis    HX ORTHOPAEDIC  2012    Dr. 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 HEART: No JVD.   EYES: No scleral icterus   NECK: No significant lymphadenopathy   LUNGS: No respiratory compromise or indrawing  ABDOMEN: Soft, non-tender, non-distended. Electronically signed by:  Samra Toro MD

## 2018-02-20 ENCOUNTER — HOSPITAL ENCOUNTER (OUTPATIENT)
Age: 63
Discharge: HOME | End: 2018-02-20
Attending: ORTHOPAEDIC SURGERY
Payer: COMMERCIAL

## 2018-02-20 DIAGNOSIS — M54.16 LUMBAR RADICULOPATHY: ICD-10-CM

## 2018-02-20 PROCEDURE — 72148 MRI LUMBAR SPINE W/O DYE: CPT

## 2018-02-22 ENCOUNTER — HOSPITAL ENCOUNTER (OUTPATIENT)
Dept: NUCLEAR MEDICINE | Age: 63
Discharge: HOME | End: 2018-02-22
Attending: ORTHOPAEDIC SURGERY
Payer: COMMERCIAL

## 2018-02-22 DIAGNOSIS — M89.8X5 PAIN IN RIGHT FEMUR: ICD-10-CM

## 2018-02-22 PROCEDURE — 78315 BONE IMAGING 3 PHASE: CPT

## 2018-03-02 ENCOUNTER — OFFICE VISIT (OUTPATIENT)
Dept: ORTHOPEDIC SURGERY | Age: 63
End: 2018-03-02

## 2018-03-02 VITALS
BODY MASS INDEX: 28.83 KG/M2 | HEIGHT: 70 IN | SYSTOLIC BLOOD PRESSURE: 154 MMHG | DIASTOLIC BLOOD PRESSURE: 87 MMHG | HEART RATE: 58 BPM | WEIGHT: 201.4 LBS | OXYGEN SATURATION: 96 %

## 2018-03-02 DIAGNOSIS — M25.551 RIGHT HIP PAIN: Primary | ICD-10-CM

## 2018-03-02 DIAGNOSIS — M54.41 CHRONIC BILATERAL LOW BACK PAIN WITH RIGHT-SIDED SCIATICA: ICD-10-CM

## 2018-03-02 DIAGNOSIS — G89.29 CHRONIC BILATERAL LOW BACK PAIN WITH RIGHT-SIDED SCIATICA: ICD-10-CM

## 2018-03-02 RX ORDER — PREGABALIN 75 MG/1
75 CAPSULE ORAL 2 TIMES DAILY
Qty: 28 CAP | Refills: 0 | Status: SHIPPED | OUTPATIENT
Start: 2018-03-02 | End: 2018-03-16

## 2018-03-02 NOTE — PROGRESS NOTES
Patient: Lady Joe                MRN: [id number]       SSN: [id number]  YOB: 1955        AGE: 58 y.o. SEX: male  Body mass index is 28.9 kg/(m^2). PCP: Angle Dominguez MD  03/02/18        REVIEW OF SYSTEMS:      CON: negative for weight loss, fever  EYE: negative for double vision  ENT: negative for hoarseness  RS:   negative for Tb  GI:    negative for blood in stool  :  negative for blood in urine  Other systems reviewed and noted below. 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 Tylor Kerr liver 11/00    Fib-4 was 0.54 from 10/14    GERD (gastroesophageal reflux disease)     and HH on UGI 11/00    Hip bursitis     Dr Clementine Marley left 2015    Prediabetes 2011     Rhinophyma     S/P cardiac cath 2000    nl, EF 60% Dr. Abad Passy Umbilical hernia        Family History   Problem Relation Age of Onset    Other Father      cardiomyopathy    Heart Disease Brother        Current Outpatient Prescriptions   Medication Sig Dispense Refill    traMADol (ULTRAM) 50 mg tablet Take 1 Tab by mouth every eight (8) hours as needed for Pain. Max Daily Amount: 150 mg. 21 Tab 0    cholecalciferol (VITAMIN D3) 1,000 unit tablet Take 2,500 Units by mouth daily.  CALCIUM CARBONATE (CALCIUM 600 PO) Take  by mouth.  Omega-3 Fatty Acids (FISH OIL) 500 mg cap Take 12,200 mg by mouth.  magnesium 200 mg tab Take 400 mg by mouth.  chromium picolinate 1,000 mcg tablet Take 1 Tab by mouth daily.  OTHER       celecoxib (CELEBREX) 200 mg capsule TAKE ONE CAPSULE BY MOUTH DAILY 90 Cap 2    omeprazole (PRILOSEC) 40 mg capsule TAKE ONE CAPSULE BY MOUTH DAILY 90 Cap 2    ascorbic acid (VITAMIN C) 500 mg tablet Take  by mouth.       diclofenac (VOLTAREN) 1 % topical gel Apply 4 grams to affected joint up to 4 times per day, maximum 16 grams per joint per day  Dispense 5 100 gram tubes 100 g 0    multivitamin (ONE A DAY) tablet Take 1 Tab by mouth daily.  cyanocobalamin 1,000 mcg tablet Take 1,000 mcg by mouth daily. Allergies   Allergen Reactions    Cymbalta [Duloxetine] Other (comments)     Felt funny       Past Surgical History:   Procedure Laterality Date    HX COLONOSCOPY      Dr. Wing Garzon 10/10 diverticulosis    HX ORTHOPAEDIC  2012    Dr. 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 HEART: No JVD. EYES: No scleral icterus   NECK: No significant lymphadenopathy   LUNGS: No respiratory compromise or indrawing  ABDOMEN: Soft, non-tender, non-distended. Electronically signed by: Ele Perez MD  HISTORY: I had the pleasure of reviewing Mr. Catherine Da Silva. As you know, he has been a bit of a diagnostic challenge and I really appreciate the MRIs of the hips and thigh that were completely negative. I got a technetium bone scan which was negative. I got an MRI of his lumbar spine, which I think is the most important finding, which shows that he has fairly severe central canal stenosis at L4-5 and degenerative changes as well as some mild edema at the end plate. Also, he has bilateral neural foraminal narrowing. He also has meralgia paresthetica, i.e. entrapment of the lateral femoral cutaneous nerve. Unfortunately the Neurontin kept him up at night. We will try him on some Lyrica. He may even be a candidate for Tegretol. PHYSICAL EXAMINATION:  On examination today, he walks normally. He looks good. He has been lifting his father-in-law who has had some health issues, and I suspect that this is mainly referred pain that he is getting. He walks normally today. The back is only minimally tender. Trochanters are not particularly tender. Hips rotate nicely. I palpated the thigh. I do not see any masses. The skin is normal. He is neurologically intact in the thigh, but he does have a lateral femoral cutaneous nerve problem and meralgia paresthetica but only limited to that nerve distribution. He looks well-nourished and well developed. PLAN:  I am going to try him with some Lyrica instead. I am going to have him seen at The [address] [place] [address]. I will have him back in about four weeks. Certainly, I do not think there is anything sinister going on, and I think it is mainly radicular-type pain and meralgia paresthetica.

## 2018-03-06 ENCOUNTER — OFFICE VISIT (OUTPATIENT)
Dept: ORTHOPEDIC SURGERY | Age: 63
End: 2018-03-06

## 2018-03-06 VITALS
OXYGEN SATURATION: 100 % | SYSTOLIC BLOOD PRESSURE: 151 MMHG | DIASTOLIC BLOOD PRESSURE: 89 MMHG | BODY MASS INDEX: 28.95 KG/M2 | WEIGHT: 202.2 LBS | TEMPERATURE: 97.7 F | RESPIRATION RATE: 18 BRPM | HEIGHT: 70 IN | HEART RATE: 40 BPM

## 2018-03-06 DIAGNOSIS — M54.16 LUMBAR RADICULOPATHY: Primary | ICD-10-CM

## 2018-03-06 DIAGNOSIS — M48.061 SPINAL STENOSIS OF LUMBAR REGION, UNSPECIFIED WHETHER NEUROGENIC CLAUDICATION PRESENT: ICD-10-CM

## 2018-03-06 RX ORDER — AMITRIPTYLINE HYDROCHLORIDE 25 MG/1
75 TABLET, FILM COATED ORAL
Qty: 90 TAB | Refills: 2 | Status: ON HOLD | OUTPATIENT
Start: 2018-03-06 | End: 2018-04-19

## 2018-03-06 NOTE — PATIENT INSTRUCTIONS
Amitriptyline ramping schedule  Week 1 - 1 pill per night for 7 days  Week 2 - 2 pills per night for 7 days  Week 3 on - 3 pills per night for 7 days

## 2018-03-06 NOTE — PROGRESS NOTES
[address], [address]  [place], [facility]  Phone: [phone]  Fax: [phone]        Slime Jalloh  : 1955  PCP: Sun Chanel MD  3/6/2018    NEW PATIENT      ASSESSMENT AND PLAN     Nazia Roche comes in to the office today c/o right thigh pain that has progressively worsened x 8 months. He rates his pain as a 6/10 today. His MRI reveals a fairly severe spinal stenosis and bilateral foraminal stenosis at L4-L5, although he does not display correlating symptoms at this time. I advised on symptoms to pay attention to. His radicular symptoms appear to be along the L2 dermatome. On examination, he has decreased sensation in the anterior thigh region along the L1/L2 distribution. His MRI reveals a minor diffuse disc bulge that causes mild bilateral foraminal stenosis at L1-L2. I suspect the pain is from lumbar radiculopathy vs meralgia paresthetica. We discussed a lumbar epidural vs EMG vs nerve medication and discussed all risks and answered all questions. He defers the injection and EMG at this time. I prescribed Amitriptyline 75mg QHS as he did not tolerate Gabapentin well in the past.      I advised he f/u with his PCP for a large renal cyst visualized on his MRI. Pt will f/u in 1 month or sooner if needed. Diagnoses and all orders for this visit:    1. Lumbar radiculopathy  -     amitriptyline (ELAVIL) 25 mg tablet; Take 3 Tabs by mouth nightly. 2. Spinal stenosis of lumbar region, unspecified whether neurogenic claudication present  -     amitriptyline (ELAVIL) 25 mg tablet; Take 3 Tabs by mouth nightly. Follow-up Disposition: Not on File    CHIEF COMPLAINT  Nazia Roche is seen today in consultation at the request of Sun Chanel MD for complaints of right thigh pain.         HISTORY OF PRESENT ILLNESS  Nazia Roche is a 58 y.o. male c/o right thigh pain that sometimes causes numbness, tingling, or throbbing pain. The pain has progressively worsened over the last 8 months. He displays weakness walking up steps. He states the pain improves if he straightens his leg completely. He was previously on Gabapentin and felt like the \"room was spinning. \" He decided to not take Lyrica for this reason, especially since he is caring for and lifting his father-in-law who recently had a stroke. He is recently retired. He worked on [employer] in the past.    Pt denies any fevers, chills, nausea, vomiting. Pt denies any chest pain and shortness of breath. Pt denies any ear, nose, and throat problems. Pt denies any fecal or urinary incontinence. 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 Merwyn Heimlich liver 11/00    Fib-4 was 0.54 from 10/14    GERD (gastroesophageal reflux disease)     and HH on UGI 11/00    Hip bursitis     Dr Cedric Foster left 2015    Prediabetes 2011     Rhinophyma     S/P cardiac cath 2000    nl, EF 60% Dr. Estevez Lowers Umbilical hernia        Past Surgical History:   Procedure Laterality Date    HX COLONOSCOPY      Dr. Ethan Angeles 10/10 diverticulosis    HX ORTHOPAEDIC  2012    Dr. Elise Henley left distal clavicle excision and acromioplasty       MEDICATIONS    Current Outpatient Prescriptions   Medication Sig Dispense Refill    pregabalin (LYRICA) 75 mg capsule Take 1 Cap by mouth two (2) times a day for 14 days. Max Daily Amount: 150 mg. 28 Cap 0    traMADol (ULTRAM) 50 mg tablet Take 1 Tab by mouth every eight (8) hours as needed for Pain. Max Daily Amount: 150 mg. 21 Tab 0    cholecalciferol (VITAMIN D3) 1,000 unit tablet Take 2,500 Units by mouth daily.  CALCIUM CARBONATE (CALCIUM 600 PO) Take  by mouth.  Omega-3 Fatty Acids (FISH OIL) 500 mg cap Take 12,200 mg by mouth.  magnesium 200 mg tab Take 400 mg by mouth.       chromium picolinate 1,000 mcg tablet Take 1 Tab by mouth daily.  OTHER       celecoxib (CELEBREX) 200 mg capsule TAKE ONE CAPSULE BY MOUTH DAILY 90 Cap 2    omeprazole (PRILOSEC) 40 mg capsule TAKE ONE CAPSULE BY MOUTH DAILY 90 Cap 2    ascorbic acid (VITAMIN C) 500 mg tablet Take  by mouth.  diclofenac (VOLTAREN) 1 % topical gel Apply 4 grams to affected joint up to 4 times per day, maximum 16 grams per joint per day  Dispense 5 100 gram tubes 100 g 0    multivitamin (ONE A DAY) tablet Take 1 Tab by mouth daily.  cyanocobalamin 1,000 mcg tablet Take 1,000 mcg by mouth daily.          ALLERGIES  Allergies   Allergen Reactions    Cymbalta [Duloxetine] Other (comments)     Felt funny    Gabapentin Other (comments)     Patient states this medication makes him feel funny            SOCIAL HISTORY    Social History     Social History    Marital status:      Spouse name: N/A    Number of children: 3    Years of education: N/A     Occupational History    mgr at [employer]      Social History Main Topics    Smoking status: Former Smoker     Quit date: 1/1/2005    Smokeless tobacco: Never Used      Comment: 60+ py    Alcohol use No    Drug use: No    Sexual activity: Not Asked     Other Topics Concern    None     Social History Narrative       FAMILY HISTORY  Family History   Problem Relation Age of Onset    Other Father      cardiomyopathy    Heart Disease Brother          REVIEW OF SYSTEMS  Review of Systems   Musculoskeletal:        Right thigh pain         PHYSICAL EXAMINATION  Visit Vitals    /89    Pulse (!) 40    Temp 97.7 °F (36.5 °C) (Oral)    Resp 18    Ht 5' 10\" (1.778 m)    Wt 202 lb 3.2 oz (91.7 kg)    SpO2 100%    BMI 29.01 kg/m2         Pain Assessment  3/6/2018   Location of Pain Back   Location Modifiers -   Severity of Pain 6   Quality of Pain Throbbing   Quality of Pain Comment numbness and tingling   Duration of Pain -   Frequency of Pain Constant Aggravating Factors Standing;Walking;Bending   Aggravating Factors Comment sitting, changing positions   Limiting Behavior -   Relieving Factors Rest   Relieving Factors Comment lying down, Gabapentin   Result of Injury -         Constitutional:  Well developed, well nourished, in no acute distress. Psychiatric: Affect and mood are appropriate. HEENT: Normocephalic, atraumatic. Extraocular movements intact. Integumentary: No rashes or abrasions noted on exposed areas. Cardiovascular: Regular rate and rhythm. Pulmonary: Clear to auscultation bilaterally. SPINE/MUSCULOSKELETAL EXAM    Cervical spine:  Neck is midline. Normal muscle tone. No focal atrophy is noted. ROM pain free. Shoulder ROM intact. No tenderness to palpation. Negative Spurling's sign. Negative Tinel's sign. Negative Tovar's sign. Sensation in the bilateral arms grossly intact to light touch. Lumbar spine:  No rash, ecchymosis, or gross obliquity. No fasciculations. No focal atrophy is noted. No pain with hip ROM. Full range of motion. No tenderness to palpation. No tenderness to palpation at the sciatic notch. SI joints non-tender. Trochanters non tender. Decreased sensation L1/L2 on the right side. MOTOR:      Biceps  Triceps Deltoids Wrist Ext Wrist Flex Hand Intrin   Right 5/5 5/5 5/5 5/5 5/5 5/5   Left 5/5 5/5 5/5 5/5 5/5 5/5             Hip Flex  Quads Hamstrings Ankle DF EHL Ankle PF   Right 5/5 5/5 5/5 5/5 5/5 5/5   Left 5/5 5/5 5/5 5/5 5/5 5/5     DTRs are 2+ biceps, triceps, brachioradialis, patella, and Achilles. Negative Straight Leg raise. Squat not tested. No difficulty with tandem gait. Ambulation without assistive device. FWB. RADIOGRAPHS  Lumbar MRI images taken on 2/20/18 personally reviewed with patient:  FINDINGS:      Sagittal images show slightly straightened lordosis.  There is no vertebral body  compression deformity or significant listhesis. There is a small amount of edema  within the posterior inferior margin of L4 and the superior posterior right  lateral margin of L5 adjacent. There is a low T1 and T2 signal 1 cm defect  within the superior midline L5 endplate which may be a Schmorl's node. Mild to  moderate disc space narrowing at this level. .      There is no other vertebral body edema. Disc desiccation with slight narrowing  L1-2. Cauda equina tapers at L1-2 level.     Partially imaged kidneys show global relative mild to moderate atrophy of the  right kidney compared to the left, partially included. There are anterior and  posterior left parapelvic renal cysts, with a 6 cm lobulated posterior cortical  partially exophytic cyst. Abdominal CT from 2/2006 is not available in PACS for  comparison. .     Correlation with axial images shows:     L1-2:  Mild diffuse disc bulge. No significant canal narrowing. There is mild  bilateral foraminal encroachment.     L2-3:  Posterior ligamentous thickening up to 5 mm. Minimal diffuse disc bulge. Central canal slightly narrowed to 10.8 mm. No significant foraminal stenosis.     L3-4:  Mild facet degenerative change with posterior ligamentous thickening up  to 5.5 mm. There is mild diffuse disc bulge. Central canal is mildly to  moderately narrowed down to 8.5 mm. There is a component of posterior epidural  lipomatosis. Mild lateral recess narrowing and mild bilateral foraminal  encroachment.     L4-5:  Fairly severe bilateral degenerative facet hypertrophy. There is  posterior ligamentous thickening up to 7 mm. There is a diffuse disc bulge at  this level which contacts and flattens the thecal sac from anterior. Central  canal is significantly narrowed down to 6 mm AP diameter. Moderate to severe  left-sided foraminal stenosis due to facet hypertrophy predominantly but also  disc bulge laterally which appears to contact the caudal surface of the exiting  nerve root.  Similar findings on the right.     L5-S1:  Moderate bilateral degenerative facet hypertrophy with mild ligamentous  thickening. There is a broad-based posterior disc bulge or protrusion which  extends 4 mm into the canal. Central canal is mildly narrowed down to 10 mm  subsequent. There is moderate to severe left and right foraminal stenosis due to  facet encroachment and endplate ridging and disc bulge.        IMPRESSION  IMPRESSION:     1. Fairly severe central canal narrowing and bilateral foraminal stenosis L4-5 on a multifactorial degenerative basis as above. -There is some mild edema within the posterior adjoining endplates at this  level.     2. Broad-based posterior disc bulge or protrusion, with mild canal but fairly  severe bilateral neural foraminal narrowing L5-S1 as above.  reviewed    Mr. Gina Robert has a reminder for a \"due or due soon\" health maintenance. I have asked that he contact his primary care provider for follow-up on this health maintenance. 40 minutes of face-to-face contact were spent with the patient during today's visit extensively discussing symptoms and treatment plan. All questions were answered. More than half of this visit today was spent on counseling. Written by Cat Mckeon, as dictated by Dr. Ivette Villagran. I, Dr. Ivette Villagran, confirm that all documentation is accurate.

## 2018-03-06 NOTE — MR AVS SNAPSHOT
[address] [facility] [address] [facility] [address] 
[phone] Patient: Maged Hoyt MRN: [id number] Visit Information Date & Time Provider Department Dept. Phone Encounter #  
 3/6/2018  2:00 PM Jenny Feliciano MD [facility] [phone] [id number] Follow-up Instructions Return in about 1 month (around 4/6/2018). Your Appointments 5/23/2018 10:40 AM  
Office Visit with Darin Caro MD  
[facility] App[facility] Note: 4 month follow up to check bp no labs per pt  
 [address] [facility], [address] [address] [address]  
  
   
 [address] [facility] Maintenance Date Due COLONOSCOPY 10/25/2020 DTaP/Tdap/Td series (2 - Td) 1/9/2027 Allergies as of 3/6/2018  Review Complete On: 3/6/2018 By: Jenny Feliciano MD  
  
 Severity Noted Reaction Type Reactions Cymbalta [Duloxetine]  01/09/2017    Other (comments) Felt funny Gabapentin  03/06/2018    Other (comments) Patient states this medication makes him feel funny Current Immunizations  Reviewed on 1/20/2018 Name Date Influenza Vaccine 10/1/2017, 12/17/2012 Influenza Vaccine (Quad) PF 10/18/2016, 10/7/2015 Influenza Vaccine PF 10/27/2014, 10/25/2013 Influenza Vaccine Whole 12/19/2011 TD Vaccine 7/1/2005 Tdap 1/9/2017 Zoster Vaccine, Live 11/6/2013  4:10 PM  
  
 Not reviewed this visit You Were Diagnosed With   
  
 Codes Comments Lumbar radiculopathy    -  Primary ICD-10-CM: M54.16 
ICD-9-CM: 724.4 Spinal stenosis of lumbar region, unspecified whether neurogenic claudication present     ICD-10-CM: M48.061 
ICD-9-CM: 724.02 Vitals BP Pulse Temp Resp Height(growth percentile) Weight(growth percentile) 151/89 (!) 40 97.7 °F (36.5 °C) (Oral) 18 5' 10\" (1.778 m) 202 lb 3.2 oz (91.7 kg) SpO2 BMI Smoking Status 100% 29.01 kg/m2 Former Smoker BMI and BSA Data Body Mass Index Body Surface Area  
 29.01 kg/m 2 2.13 m 2 Preferred Pharmacy Pharmacy Name Phone [address], [address] [phone] Your Updated Medication List  
  
   
This list is accurate as of 3/6/18  3:40 PM.  Always use your most recent med list.  
  
  
  
  
 amitriptyline 25 mg tablet Commonly known as:  ELAVIL Take 3 Tabs by mouth nightly. ascorbic acid (vitamin C) 500 mg tablet Commonly known as:  VITAMIN C Take  by mouth. CALCIUM 600 PO Take  by mouth. celecoxib 200 mg capsule Commonly known as:  CELEBREX  
TAKE ONE CAPSULE BY MOUTH DAILY chromium picolinate 1,000 mcg tablet Take 1 Tab by mouth daily. cyanocobalamin 1,000 mcg tablet Take 1,000 mcg by mouth daily. diclofenac 1 % Gel Commonly known as:  VOLTAREN Apply 4 grams to affected joint up to 4 times per day, maximum 16 grams per joint per day Dispense 5 100 gram tubes FISH  mg Cap Generic drug:  Omega-3 Fatty Acids Take 12,200 mg by mouth.  
  
 magnesium 200 mg Tab Take 400 mg by mouth.  
  
 multivitamin tablet Commonly known as:  ONE A DAY Take 1 Tab by mouth daily. omeprazole 40 mg capsule Commonly known as:  PRILOSEC  
TAKE ONE CAPSULE BY MOUTH DAILY  
  
 OTHER  
  
 pregabalin 75 mg capsule Commonly known as:  Krystal Felixs Take 1 Cap by mouth two (2) times a day for 14 days. Max Daily Amount: 150 mg.  
  
 traMADol 50 mg tablet Commonly known as:  ULTRAM  
Take 1 Tab by mouth every eight (8) hours as needed for Pain. Max Daily Amount: 150 mg. VITAMIN D3 1,000 unit tablet Generic drug:  cholecalciferol Take 2,500 Units by mouth daily. Prescriptions Sent to Pharmacy Refills amitriptyline (ELAVIL) 25 mg tablet 2 Sig: Take 3 Tabs by mouth nightly. Class: Normal  
 Pharmacy: [facility], [address] [place] [address] #: [phone] Route: Oral  
  
Follow-up Instructions Return in about 1 month (around 4/6/2018). Patient Instructions Amitriptyline ramping schedule Week 1 - 1 pill per night for 7 days Week 2 - 2 pills per night for 7 days Week 3 on - 3 pills per night for 7 days Please provide this summary of care documentation to your next provider. Your primary care clinician is listed as Paul Isbell. If you have any questions after today's visit, please call [phone].

## 2018-04-10 ENCOUNTER — OFFICE VISIT (OUTPATIENT)
Dept: ORTHOPEDIC SURGERY | Age: 63
End: 2018-04-10

## 2018-04-10 VITALS
RESPIRATION RATE: 18 BRPM | OXYGEN SATURATION: 100 % | BODY MASS INDEX: 28.92 KG/M2 | HEART RATE: 84 BPM | TEMPERATURE: 97.4 F | HEIGHT: 70 IN | WEIGHT: 202 LBS | DIASTOLIC BLOOD PRESSURE: 95 MMHG | SYSTOLIC BLOOD PRESSURE: 137 MMHG

## 2018-04-10 DIAGNOSIS — M48.062 SPINAL STENOSIS OF LUMBAR REGION WITH NEUROGENIC CLAUDICATION: ICD-10-CM

## 2018-04-10 DIAGNOSIS — M54.16 LUMBAR RADICULOPATHY: Primary | ICD-10-CM

## 2018-04-10 RX ORDER — KETOROLAC TROMETHAMINE 15 MG/ML
15 INJECTION, SOLUTION INTRAMUSCULAR; INTRAVENOUS ONCE
Qty: 1 VIAL | Refills: 0
Start: 2018-04-10 | End: 2018-04-10

## 2018-04-10 NOTE — PROGRESS NOTES
[address]  [address], [address]  [place], [facility]– [address]  Phone: [phone]  Fax: [phone]        Bridger Mcgee  : 1955  PCP: Lucina Lawson MD  4/10/2018    PROGRESS NOTE      ASSESSMENT AND PLAN    Shannan Paez comes in to the office today for 1 month f/u. He notes he has had increased pain since his last visit. He has had more pain radiating from his low back radiating into the posterior aspect of his BLE. He also continues to have pain radiating into his bilateral thighs, but the pain in the posterior aspect of his BLE is causing more of his pain today. He rates his pain as a 9/10 today. His pain and radicular symptoms are likely due to the spinal stenosis and bilateral foraminal stenoses at L4-5 visualized on his MRI. We discussed a lumbar epidural vs EMG vs surgical consult. He would like to proceed with the lumbar epidural. I referred for an L2-3 interlaminar injection. We provided a Toradol shot in office today. I advised he will need to remain off of Celebrex for 5 days prior to the injection. I also advised he not take Celebrex and Aleve at the same time, and instead, to take Tylenol. I advised he not exceed 4000mg Tylenol per day. We may consider pain management if his symptoms continue to worsen. Pt will f/u in 3 weeks or sooner as needed. Diagnoses and all orders for this visit:    1. Lumbar radiculopathy  -     SCHEDULE SURGERY  -     KETOROLAC TROMETHAMINE INJ  -     ketorolac (TORADOL) 15 mg/mL soln injection; 1 mL by IntraMUSCular route once for 1 dose. -     THER/PROPH/DIAG INJECTION, SUBCUT/IM    2. Spinal stenosis of lumbar region with neurogenic claudication  -     SCHEDULE SURGERY  -     KETOROLAC TROMETHAMINE INJ  -     ketorolac (TORADOL) 15 mg/mL soln injection; 1 mL by IntraMUSCular route once for 1 dose.   -     THER/PROPH/DIAG INJECTION, SUBCUT/IM         Follow-up Disposition: Not on File      HISTORY OF PRESENT ILLNESS  True Ewing is a 58 y.o. male. A&P / HPI from 3/6/18:  True Ewing comes in to the office today c/o right thigh pain that has progressively worsened x 8 months. He rates his pain as a 6/10 today.      His MRI reveals a fairly severe spinal stenosis and bilateral foraminal stenosis at L4-L5, although he does not display correlating symptoms at this time. I advised on symptoms to pay attention to.       His radicular symptoms appear to be along the L2 dermatome. On examination, he has decreased sensation in the anterior thigh region along the L1/L2 distribution. His MRI reveals a minor diffuse disc bulge that causes mild bilateral foraminal stenosis at L1-L2. I suspect the pain is from lumbar radiculopathy vs meralgia paresthetica.     We discussed a lumbar epidural vs EMG vs nerve medication and discussed all risks and answered all questions. He defers the injection and EMG at this time. I prescribed Amitriptyline 75mg QHS as he did not tolerate Gabapentin well in the past.       I advised he f/u with his PCP for a large renal cyst visualized on his MRI.      Pt will f/u in 1 month or sooner if needed. HISTORY OF PRESENT ILLNESS  True Ewing is a 58 y.o. male c/o right thigh pain that sometimes causes numbness, tingling, or throbbing pain. The pain has progressively worsened over the last 8 months. He displays weakness walking up steps. He states the pain improves if he straightens his leg completely.      He was previously on Gabapentin and felt like the \"room was spinning. \" He decided to not take Lyrica for this reason, especially since he is caring for and lifting his father-in-law who recently had a stroke.      He is recently retired. He worked on [employer] in the past.     Pt denies any fevers, chills, nausea, vomiting. Pt denies any chest pain and shortness of breath. Pt denies any ear, nose, and throat problems.  Pt denies any fecal or urinary incontinence.        Updates from 04/10/18:  Pt presents for 1 month f/u. He notes he has had increased pain since his last visit. He has had more pain radiating from his low back radiating into the posterior aspect of his BLE. He also continues to have pain radiating into his bilateral thighs, but the pain in the posterior aspect of his BLE is causing more of his pain today. He rates his pain as a 9/10 today. He has seen some relief using Aleve. He has not seen any relief with the Amitriptyline or the Salonpas. He is accompanied by his wife. 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 Sparks [facility] liver 11/00    Fib-4 was 0.54 from 10/14    GERD (gastroesophageal reflux disease)     and HH on UGI 11/00    Hip bursitis     Dr Ashley Boland left 2015    Prediabetes 2011     Rhinophyma     S/P cardiac cath 2000    nl, EF 60% Dr. Della Cast Umbilical hernia        Past Surgical History:   Procedure Laterality Date    HX COLONOSCOPY      Dr. Denver Kay 10/10 diverticulosis    HX ORTHOPAEDIC  2012    Dr. Alfonso De Los Santos left distal clavicle excision and acromioplasty   . MEDICATIONS    Current Outpatient Prescriptions   Medication Sig Dispense Refill    amitriptyline (ELAVIL) 25 mg tablet Take 3 Tabs by mouth nightly. 90 Tab 2    traMADol (ULTRAM) 50 mg tablet Take 1 Tab by mouth every eight (8) hours as needed for Pain. Max Daily Amount: 150 mg. 21 Tab 0    cholecalciferol (VITAMIN D3) 1,000 unit tablet Take 2,500 Units by mouth daily.  CALCIUM CARBONATE (CALCIUM 600 PO) Take  by mouth.  Omega-3 Fatty Acids (FISH OIL) 500 mg cap Take 12,200 mg by mouth.  magnesium 200 mg tab Take 400 mg by mouth.  chromium picolinate 1,000 mcg tablet Take 1 Tab by mouth daily.       celecoxib (CELEBREX) 200 mg capsule TAKE ONE CAPSULE BY MOUTH DAILY 90 Cap 2    omeprazole (PRILOSEC) 40 mg capsule TAKE ONE CAPSULE BY MOUTH DAILY 90 Cap 2    ascorbic acid (VITAMIN C) 500 mg tablet Take  by mouth.  diclofenac (VOLTAREN) 1 % topical gel Apply 4 grams to affected joint up to 4 times per day, maximum 16 grams per joint per day  Dispense 5 100 gram tubes 100 g 0    multivitamin (ONE A DAY) tablet Take 1 Tab by mouth daily.  cyanocobalamin 1,000 mcg tablet Take 1,000 mcg by mouth daily.  OTHER           ALLERGIES  Allergies   Allergen Reactions    Cymbalta [Duloxetine] Other (comments)     Felt funny    Gabapentin Other (comments)     Patient states this medication makes him feel funny            SOCIAL HISTORY    Social History     Social History    Marital status:      Spouse name: N/A    Number of children: 3    Years of education: N/A     Occupational History    mgr at [employer]      Social History Main Topics    Smoking status: Former Smoker     Quit date: 1/1/2005    Smokeless tobacco: Never Used      Comment: 60+ py    Alcohol use No    Drug use: No    Sexual activity: Not Asked     Other Topics Concern    None     Social History Narrative       FAMILY HISTORY  Family History   Problem Relation Age of Onset    Other Father      cardiomyopathy    Heart Disease Brother          REVIEW OF SYSTEMS  Review of Systems   Musculoskeletal: Positive for back pain.         BLE pain          PHYSICAL EXAMINATION  Visit Vitals    BP (!) 137/95    Pulse 84    Temp 97.4 °F (36.3 °C) (Oral)    Resp 18    Ht 5' 10\" (1.778 m)    Wt 202 lb (91.6 kg)    SpO2 100%    BMI 28.98 kg/m2       Pain Assessment  4/10/2018   Location of Pain Back   Location Modifiers -   Severity of Pain 4   Quality of Pain Aching   Quality of Pain Comment numbness and tingling   Duration of Pain -   Frequency of Pain Constant   Aggravating Factors (No Data)   Aggravating Factors Comment pateint states getting out of bed triggers pain   Limiting Behavior -   Relieving Factors (No Data) Relieving Factors Comment Pain Medication and Patches   Result of Injury -           Constitutional:  Well developed, well nourished, in no acute distress. Psychiatric: Affect and mood are appropriate. Integumentary: No rashes or abrasions noted on exposed areas. SPINE/MUSCULOSKELETAL EXAM    Cervical spine:  Neck is midline. Normal muscle tone. No focal atrophy is noted. ROM pain free. Shoulder ROM intact. No tenderness to palpation. Negative Spurling's sign. Negative Tinel's sign. Negative Tovar's sign.       Sensation in the bilateral arms grossly intact to light touch.      Lumbar spine:  No rash, ecchymosis, or gross obliquity. No fasciculations. No focal atrophy is noted. No pain with hip ROM. Full range of motion. No tenderness to palpation. No tenderness to palpation at the sciatic notch. SI joints non-tender. Trochanters non tender.      Decreased sensation L1/L2 on the right side. MOTOR:      Biceps  Triceps Deltoids Wrist Ext Wrist Flex Hand Intrin   Right 5/5 5/5 5/5 5/5 5/5 5/5   Left 5/5 5/5 5/5 5/5 5/5 5/5             Hip Flex  Quads Hamstrings Ankle DF EHL Ankle PF   Right 5/5 5/5 5/5 5/5 5/5 5/5   Left 5/5 5/5 5/5 5/5 5/5 5/5     DTRs are 2+ biceps, triceps, brachioradialis, patella, and Achilles.     Negative Straight Leg raise. Squat not tested. No difficulty with tandem gait.      Ambulation without assistive device. FWB.       RADIOGRAPHS  Lumbar MRI images taken on 2/20/18 personally reviewed with patient:  FINDINGS:       Sagittal images show slightly straightened lordosis. There is no vertebral body  compression deformity or significant listhesis. There is a small amount of edema  within the posterior inferior margin of L4 and the superior posterior right  lateral margin of L5 adjacent. There is a low T1 and T2 signal 1 cm defect  within the superior midline L5 endplate which may be a Schmorl's node.  Mild to  moderate disc space narrowing at this level. .       There is no other vertebral body edema. Disc desiccation with slight narrowing  L1-2. Cauda equina tapers at L1-2 level.      Partially imaged kidneys show global relative mild to moderate atrophy of the  right kidney compared to the left, partially included. There are anterior and  posterior left parapelvic renal cysts, with a 6 cm lobulated posterior cortical  partially exophytic cyst. Abdominal CT from 2/2006 is not available in PACS for  comparison. Maged [place]  Correlation with axial images shows:      L1-2:  Mild diffuse disc bulge. No significant canal narrowing. There is mild  bilateral foraminal encroachment.      L2-3:  Posterior ligamentous thickening up to 5 mm. Minimal diffuse disc bulge. Central canal slightly narrowed to 10.8 mm. No significant foraminal stenosis.      L3-4:  Mild facet degenerative change with posterior ligamentous thickening up  to 5.5 mm. There is mild diffuse disc bulge. Central canal is mildly to  moderately narrowed down to 8.5 mm. There is a component of posterior epidural  lipomatosis. Mild lateral recess narrowing and mild bilateral foraminal  encroachment.      L4-5:  Fairly severe bilateral degenerative facet hypertrophy. There is  posterior ligamentous thickening up to 7 mm. There is a diffuse disc bulge at  this level which contacts and flattens the thecal sac from anterior. Central  canal is significantly narrowed down to 6 mm AP diameter. Moderate to severe  left-sided foraminal stenosis due to facet hypertrophy predominantly but also  disc bulge laterally which appears to contact the caudal surface of the exiting  nerve root. Similar findings on the right.      L5-S1:  Moderate bilateral degenerative facet hypertrophy with mild ligamentous  thickening. There is a broad-based posterior disc bulge or protrusion which  extends 4 mm into the canal. Central canal is mildly narrowed down to 10 mm  subsequent.  There is moderate to severe left and right foraminal stenosis due to  facet encroachment and endplate ridging and disc bulge.          IMPRESSION  IMPRESSION:      1.  Fairly severe central canal narrowing and bilateral foraminal stenosis L4-5 on a multifactorial degenerative basis as above. -There is some mild edema within the posterior adjoining endplates at this  level.      2. Broad-based posterior disc bulge or protrusion, with mild canal but fairly  severe bilateral neural foraminal narrowing L5-S1 as above. 26 minutes of face-to-face contact were spent with the patient during today's visit extensively discussing symptoms and treatment plan. All questions were answered. More than half of this visit today was spent on counseling.      Written by Kellen Montanez as dictated by Oscar Palacios MD

## 2018-04-19 ENCOUNTER — HOSPITAL ENCOUNTER (OUTPATIENT)
Age: 63
Setting detail: OUTPATIENT SURGERY
Discharge: HOME | End: 2018-04-19
Attending: PHYSICAL MEDICINE & REHABILITATION | Admitting: PHYSICAL MEDICINE & REHABILITATION
Payer: COMMERCIAL

## 2018-04-19 ENCOUNTER — APPOINTMENT (OUTPATIENT)
Dept: GENERAL RADIOLOGY | Age: 63
End: 2018-04-19
Attending: PHYSICAL MEDICINE & REHABILITATION
Payer: COMMERCIAL

## 2018-04-19 VITALS
HEART RATE: 71 BPM | OXYGEN SATURATION: 94 % | BODY MASS INDEX: 28.92 KG/M2 | DIASTOLIC BLOOD PRESSURE: 98 MMHG | TEMPERATURE: 97.8 F | RESPIRATION RATE: 18 BRPM | SYSTOLIC BLOOD PRESSURE: 163 MMHG | HEIGHT: 70 IN | WEIGHT: 202 LBS

## 2018-04-19 PROCEDURE — 74011000250 HC RX REV CODE- 250

## 2018-04-19 PROCEDURE — 77030014124 HC TY EPDRL BBMI -A: Performed by: PHYSICAL MEDICINE & REHABILITATION

## 2018-04-19 PROCEDURE — 76010000009 HC PAIN MGT 0 TO 30 MIN PROC: Performed by: PHYSICAL MEDICINE & REHABILITATION

## 2018-04-19 PROCEDURE — 74011250636 HC RX REV CODE- 250/636: Performed by: PHYSICAL MEDICINE & REHABILITATION

## 2018-04-19 PROCEDURE — 74011636320 HC RX REV CODE- 636/320

## 2018-04-19 PROCEDURE — 74011250636 HC RX REV CODE- 250/636

## 2018-04-19 RX ORDER — DEXAMETHASONE SODIUM PHOSPHATE 100 MG/10ML
INJECTION INTRAMUSCULAR; INTRAVENOUS AS NEEDED
Status: DISCONTINUED | OUTPATIENT
Start: 2018-04-19 | End: 2018-04-19 | Stop reason: HOSPADM

## 2018-04-19 RX ORDER — ACETAMINOPHEN 500 MG
1000 TABLET ORAL
COMMUNITY

## 2018-04-19 RX ORDER — LIDOCAINE HYDROCHLORIDE 10 MG/ML
INJECTION, SOLUTION EPIDURAL; INFILTRATION; INTRACAUDAL; PERINEURAL AS NEEDED
Status: DISCONTINUED | OUTPATIENT
Start: 2018-04-19 | End: 2018-04-19 | Stop reason: HOSPADM

## 2018-04-19 RX ORDER — DIAZEPAM 5 MG/1
5-20 TABLET ORAL ONCE
Status: DISCONTINUED | OUTPATIENT
Start: 2018-04-19 | End: 2018-04-19 | Stop reason: HOSPADM

## 2018-04-19 NOTE — DISCHARGE INSTRUCTIONS
[facility]   (MARGARET)  Dr. Lukas Eaton, Dr. Amol Ozuna, Dr. Kendall Harness not drive a car, operate heavy machinery or dangerous equipment for 24 hours. * Activity as tolerated; rest for the remainder of the day. * Resume pre-block medications including those for your family doctor. * Do not drink alcoholic beverages for 24 hours. Alcohol and the medications you have received may interact and cause an adverse reaction. * You may feel better this evening and worse tomorrow, as the numbing medications wears off and the steroid has yet to begin to work. After 48 hrs the steroid should begin to release bringing you relief. * You may shower this evening and remove any bandages. * Avoid hot tubs and heating pads for 24 hours. You may use cold packs on the procedure site as tolerated for the first 24 hours. * If a headache develops, drink plenty of fluids and rest.  Take over the counter medications for headache if needed. If the headache continues longer than 24 hours, call MD at the [address] [place] [address]. [phone]    * Continue taking pain medications as needed. * You may resume your regular diet if tolerated. Otherwise, start with sips of water and advance slowly. * If Diabetic: check your blood sugar three times a day for the next 3 days. If your sugar is greater than 300 call your family doctor. If your sugar is greater than 400, have someone transport you to the nearest Emergency Room. * If you experience any of the following problems, Please Call the [address] [place] [address] at [phone].         * Shortness of Breath    * Fever of 101 or higher    * Nausea / Vomiting    * Severe Headache    * Weakness or numbness in arms or legs that is not      resolving    * Prolonged increase in pain greater than 4 days      DISCHARGE SUMMARY from Nurse      PATIENT INSTRUCTIONS:    After oral sedation, for 24 hours or while taking prescription Narcotics:  · Limit your activities  · Do not drive and operate hazardous machinery  · Do not make important personal or business decisions  · Do  not drink alcoholic beverages  · If you have not urinated within 8 hours after discharge, please contact your surgeon on call. Report the following to your surgeon:  · Excessive pain, swelling, redness or odor of or around the surgical area  · Temperature over 101  · Nausea and vomiting lasting longer than 4 hours or if unable to take medications  · Any signs of decreased circulation or nerve impairment to extremity: change in color, persistent  numbness, tingling, coldness or increase pain  · Any questions            What to do at Home:  Recommended activity: Activity as tolerated, NO DRIVING FOR 12 Hours post injection          *  Please give a list of your current medications to your Primary Care Provider. *  Please update this list whenever your medications are discontinued, doses are      changed, or new medications (including over-the-counter products) are added. *  Please carry medication information at all times in case of emergency situations. These are general instructions for a healthy lifestyle:    No smoking/ No tobacco products/ Avoid exposure to second hand smoke    Surgeon General's Warning:  Quitting smoking now greatly reduces serious risk to your health. Obesity, smoking, and sedentary lifestyle greatly increases your risk for illness    A healthy diet, regular physical exercise & weight monitoring are important for maintaining a healthy lifestyle    You may be retaining fluid if you have a history of heart failure or if you experience any of the following symptoms:  Weight gain of 3 pounds or more overnight or 5 pounds in a week, increased swelling in our hands or feet or shortness of breath while lying flat in bed.   Please call your doctor as soon as you notice any of these symptoms; do not wait until your next office visit. Recognize signs and symptoms of STROKE:    F-face looks uneven    A-arms unable to move or move unevenly    S-speech slurred or non-existent    T-time-call 911 as soon as signs and symptoms begin-DO NOT go       Back to bed or wait to see if you get better-TIME IS BRAIN.

## 2018-04-19 NOTE — PROCEDURES
Intralaminar Epidural Steroid Block Procedure Note      Patient Name: Radha Grijalva    Date of Procedure: April 19, 2018    Preoperative Diagnosis: Lumbar radiculopathy [M54.16]    Post Operative Diagnosis: Lumbar radiculopathy [M54.16]    Procedure: L2-L3 Epidural Block     PROCEDURE:  Lumbar Epidural Block    Consent:  Informed  Consent was obtained prior to the procedure. The patient was given the opportunity to ask questions regarding the procedure and its associated risks. In addition to the potential risks associated with the procedure itself, the patient was informed both verbally and in writing of potential side effects of the use glucocorticoids. The patient appeared to comprehend the informed consent and desired to have the procedure performed. The patient was placed in the prone position on the fluoroscopy table and the back prepped and draped in the usual sterile manner. The interlaminar space was identified using fluoroscopy and the skin anesthetized with 1% Lidocaine. A #18 Tuohy Epidural needle was advanced under fluoroscopic control from a paramedian approach to the  epidural space as noted above, using the loss of resistance to fluid technique. Then, 30 mg of preservative free Dexamethasone and 4 cc of Lidocaine was slowly injected. The patient tolerated the procedure well. The injection area was cleaned and band aids applied. No excessive bleeding was noted. Patient dressed and was discharged to home with instructions.

## 2018-05-08 ENCOUNTER — OFFICE VISIT (OUTPATIENT)
Dept: ORTHOPEDIC SURGERY | Age: 63
End: 2018-05-08

## 2018-05-08 VITALS
OXYGEN SATURATION: 99 % | BODY MASS INDEX: 28.26 KG/M2 | DIASTOLIC BLOOD PRESSURE: 87 MMHG | SYSTOLIC BLOOD PRESSURE: 130 MMHG | TEMPERATURE: 97.4 F | HEART RATE: 61 BPM | RESPIRATION RATE: 16 BRPM | HEIGHT: 70 IN | WEIGHT: 197.4 LBS

## 2018-05-08 DIAGNOSIS — M47.816 SPONDYLOSIS OF LUMBAR REGION WITHOUT MYELOPATHY OR RADICULOPATHY: ICD-10-CM

## 2018-05-08 DIAGNOSIS — M48.062 SPINAL STENOSIS OF LUMBAR REGION WITH NEUROGENIC CLAUDICATION: ICD-10-CM

## 2018-05-08 DIAGNOSIS — M47.819 FACET ARTHROPATHY: ICD-10-CM

## 2018-05-08 DIAGNOSIS — M54.16 LUMBAR RADICULOPATHY: Primary | ICD-10-CM

## 2018-05-08 NOTE — PATIENT INSTRUCTIONS
Learning About a Facet Joint Injection  What is a facet joint injection? A facet joint injection is a shot of medicine to help with pain from arthritis or other causes. The injection goes into your neck or back, depending on where your pain is. Facet joints connect your vertebrae to each other. Problems in these joints can cause long-term (chronic) pain in the neck or back, or sometimes in the shoulders, arms, buttocks, or legs. The injection contains a numbing medicine, which works right away for a short time. After the numbing medicine works, the doctor usually will add a steroid medicine to the injection. Steroids reduce swelling and pain, but they don't always work. How is a facet joint injection done? You may get medicine to help you relax. The doctor will use a tiny needle to numb the skin in the area where you are getting the facet joint injection. After the skin is numb, your doctor will use a larger needle for the actual facet joint injection. He or she will use X-rays to help guide the needle into the facet joint. You may feel some pressure. But you should not feel pain. The procedure takes 10 to 30 minutes. You will probably go home about an hour after your injection. What can you expect after a facet joint injection? You may have numbness for a few hours. The numbness could be in your neck or back, or your arm or leg, depending on where you got the shot. You will need someone to drive you home. Your pain may be gone right away. But it may return after a few hours or days. This is because the steroid medicine has not started to work yet. Steroids don't always work. And when they do, it takes a few days. But when they work, the pain relief can last for several days to a few months or longer. You may want to do less than normal for a few days. But you may also be able to return to your daily routine. It's usually best to increase your activities slowly over time.  Follow your doctor's instructions carefully. Follow-up care is a key part of your treatment and safety. Be sure to make and go to all appointments, and call your doctor if you are having problems. It's also a good idea to know your test results and keep a list of the medicines you take. Where can you learn more? Go to http://martina-dale.info/. Enter B481 in the search box to learn more about \"Learning About a Facet Joint Injection. \"  Current as of: March 21, 2017  Content Version: 11.4  © 4662-1117 [employer]. Care instructions adapted under license by [facility] (which disclaims liability or warranty for this information). If you have questions about a medical condition or this instruction, always ask your healthcare professional. [address] any warranty or liability for your use of this information. Learning About Medial Branch Block and Neurotomy  What are medial branch block and neurotomy? Facet joints connect your vertebrae to each other. Problems in these joints can cause chronic (long-term) pain in the neck or back. They can sometimes affect the shoulders, arms, buttocks, or legs. Medial branch nerves are the nerves that carry many of the pain messages from your facet joints. Radiofrequency medial branch neurotomy is a type of medial branch neurotomy that is used to relieve arthritis pain. It uses radio waves to damage nerves in your neck or back so that they can no longer send pain messages to your brain. Before your doctor knows if a neurotomy will help you, he or she will do a medial branch block to find out if certain nerves are the ones that are a source of your pain. You will need two separate visits to the outpatient center or hospital to have both procedures. How is a medial branch block done? The doctor will use a tiny needle to numb the skin where you will get the block. Then he or she puts the block needle into the numbed area.  You may feel some pressure, but you should not feel pain. Using fluoroscopy (live X-ray) to guide the needle, the doctor injects medicine onto one or more nerves to make them numb. If you get relief from your pain in the next 4 to 6 hours, it's a sign that those nerves may be contributing to your pain. The relief will last only a short time. You may then have a medial branch neurotomy at a later visit to try to get longer relief. It takes 20 to 30 minutes to get the block. You can go home after the doctor watches you for about an hour. You will get instructions on how to report how much pain you have when you are at home. You will need someone to drive you home. How is medial branch neurotomy done? The doctor will use a tiny needle to numb the skin where you will get the neurotomy. Then he or she puts the neurotomy needle into the numbed area. You may feel some pressure. Using fluoroscopy (live X-ray) to guide the needle, the doctor sends radio waves through the needle to the nerve for 60 to 90 seconds. The radio waves heat the nerve, which damages it. The doctor may do this several times. And he or she may treat more than one nerve. It takes 45 to 90 minutes to get a neurotomy, depending on how many nerves are heated. You will probably go home 30 to 60 minutes later. You will need someone to drive you home. What can you expect after a neurotomy? You may feel a little sore or tender at the injection site at first. But after a successful neurotomy, most people have pain relief right away. It often lasts for 9 to 12 months or longer. Sometimes the pain relief is permanent. If your pain does come back, it may mean that the damaged nerve has healed and can send pain messages again. Or it can mean that a different nerve is causing pain. Your doctor will discuss your options with you. Follow-up care is a key part of your treatment and safety.  Be sure to make and go to all appointments, and call your doctor if you are having problems. It's also a good idea to know your test results and keep a list of the medicines you take. Where can you learn more? Go to http://martina-dale.info/. Enter I871 in the search box to learn more about \"Learning About Medial Branch Block and Neurotomy. \"  Current as of: October 14, 2016  Content Version: 11.4  © 8323-3846 [facility]. Care instructions adapted under license by [employer] (which disclaims liability or warranty for this information). If you have questions about a medical condition or this instruction, always ask your healthcare professional. [address] any warranty or liability for your use of this information.

## 2018-05-08 NOTE — PROGRESS NOTES
[facility].  [address], [address]  [place], [address] [place] [address] Street  Phone: [phone]  Fax: [phone]        Johnathon Li  : 1955  PCP: Shalom Barker MD  2018    PROGRESS NOTE      ASSESSMENT AND PLAN    Ale Phillip comes in to the office today for L2-3 interlaminar injection f/u. He notes he has had significant relief from the injection and continues to feel relief today. He rates his pain as a 0/10 today. He occasionally feels pain in his lower buttocks, but it will relieve when he begins walking. He notes he has pain in his low back while holding his 7 month old granddaughter. This pain is likely a result of the bilateral facet hypertrophy visualized on his MRI at L4-5 and L5-S1. We discussed treatment with a facet joint block vs medial branch block/RFA. He would like to consider his options. I provided information on both procedures. I advised he can take Tylenol in conjunction with the Celebrex. He notes he tried the Gabapentin again, and he is tolerating it well. He continues to use Salonpas for his RLE pain. I advised him on posture and activity. Pt will f/u in 3 months or sooner as needed. Diagnoses and all orders for this visit:    1. Lumbar radiculopathy    2. Spinal stenosis of lumbar region with neurogenic claudication    3. Facet arthropathy ([facility] [address]    4. Spondylosis of lumbar region without myelopathy or radiculopathy         Follow-up Disposition: Not on File      HISTORY OF PRESENT ILLNESS  Ale Phillip is a 58 y.o. male. A&P / HPI from 3/6/18:  Sara Hi in to the office today c/o right thigh pain that has progressively worsened x 8 months. He rates his pain as a 6/10 today.       His MRI reveals a fairly severe spinal stenosis and bilateral foraminal stenosis at L4-L5, although he does not display correlating symptoms at this time. I advised on symptoms to pay attention to.        His radicular symptoms appear to be along the L2 dermatome. On examination, he has decreased sensation in the anterior thigh region along the L1/L2 distribution. His MRI reveals a minor diffuse disc bulge that causes mild bilateral foraminal stenosis at L1-L2. I suspect the pain is from lumbar radiculopathy vs meralgia paresthetica.      We discussed a lumbar epidural vs EMG vs nerve medication and discussed all risks and answered all questions. He defers the injection and EMG at this time. I prescribed Amitriptyline 75mg QHS as he did not tolerate Gabapentin well in the past.        I advised he f/u with his PCP for a large renal cyst visualized on his MRI.       Pt will f/u in 1 month or sooner if needed.     HISTORY OF PRESENT ILLNESS  Cesar Eric a 58 y. o. male c/o right thigh pain that sometimes causes numbness, tingling, or throbbing pain. The pain has progressively worsened over the last 8 months. He displays weakness walking up steps. He states the pain improves if he straightens his leg completely.       He was previously on Gabapentin and felt like the \"room was spinning. \" He decided to not take Lyrica for this reason, especially since he is caring for and lifting his father-in-law who recently had a stroke.       He is recently retired. He worked on submarines in the past.      Pt denies any fevers, chills, nausea, vomiting. Pt denies any chest pain and shortness of breath. Pt denies any ear, nose, and throat problems. Pt denies any fecal or urinary incontinence.         Updates from 04/10/18:  Pt presents for 1 month f/u.     He notes he has had increased pain since his last visit. He has had more pain radiating from his low back radiating into the posterior aspect of his BLE. He also continues to have pain radiating into his bilateral thighs, but the pain in the posterior aspect of his BLE is causing more of his pain today. He rates his pain as a 9/10 today.  He has seen some relief using Andrei.     He has not seen any relief with the Amitriptyline or the Salonpas.     He is accompanied by his wife. Updates from 05/08/18:  Pt presents for L2-3 interlaminar injection f/u. He notes he has had significant relief from the injection and continues to feel relief today. He notes once he tried the Gabapentin for longer than 2 days, he was able to tolerate the medication well. He is accompanied by his wife. 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 Sue Sutton liver 11/00    Fib-4 was 0.54 from 10/14    GERD (gastroesophageal reflux disease)     and HH on UGI 11/00    Hip bursitis     Dr Jessie Pascual left 2015    Prediabetes 2011     Rhinophyma     S/P cardiac cath 2000    nl, EF 60% Dr. Johana Richards Umbilical hernia        Past Surgical History:   Procedure Laterality Date    HX COLONOSCOPY      Dr. Oswaldo Tirado 10/10 diverticulosis    HX ORTHOPAEDIC  2012    Dr. Parth Cabrera left distal clavicle excision and acromioplasty   . MEDICATIONS    Current Outpatient Prescriptions   Medication Sig Dispense Refill    acetaminophen (TYLENOL EXTRA STRENGTH) 500 mg tablet Take 1,000 mg by mouth every six (6) hours as needed for Pain.  traMADol (ULTRAM) 50 mg tablet Take 1 Tab by mouth every eight (8) hours as needed for Pain. Max Daily Amount: 150 mg. 21 Tab 0    cholecalciferol (VITAMIN D3) 1,000 unit tablet Take 2,500 Units by mouth daily.  CALCIUM CARBONATE (CALCIUM 600 PO) Take  by mouth.  Omega-3 Fatty Acids (FISH OIL) 500 mg cap Take 12,200 mg by mouth.  magnesium 200 mg tab Take 400 mg by mouth.  chromium picolinate 1,000 mcg tablet Take 1 Tab by mouth daily.       OTHER       celecoxib (CELEBREX) 200 mg capsule TAKE ONE CAPSULE BY MOUTH DAILY 90 Cap 2    omeprazole (PRILOSEC) 40 mg capsule TAKE ONE CAPSULE BY MOUTH DAILY 90 Cap 2    ascorbic acid (VITAMIN C) 500 mg tablet Take  by mouth.  diclofenac (VOLTAREN) 1 % topical gel Apply 4 grams to affected joint up to 4 times per day, maximum 16 grams per joint per day  Dispense 5 100 gram tubes 100 g 0    multivitamin (ONE A DAY) tablet Take 1 Tab by mouth daily.  cyanocobalamin 1,000 mcg tablet Take 1,000 mcg by mouth daily. ALLERGIES  Allergies   Allergen Reactions    Cymbalta [Duloxetine] Other (comments)     Felt funny    Gabapentin Other (comments)     Patient states this medication makes him feel funny            SOCIAL HISTORY    Social History     Social History    Marital status:      Spouse name: N/A    Number of children: 3    Years of education: N/A     Occupational History    mgr at [employer]      Social History Main Topics    Smoking status: Former Smoker     Quit date: 1/1/2005    Smokeless tobacco: Never Used      Comment: 60+ py    Alcohol use No    Drug use: No    Sexual activity: Not on file     Other Topics Concern    Not on file     Social History Narrative       FAMILY HISTORY  Family History   Problem Relation Age of Onset    Other Father      cardiomyopathy    Heart Disease Brother          REVIEW OF SYSTEMS  Review of Systems   Musculoskeletal: Positive for back pain. BLE pain          PHYSICAL EXAMINATION  There were no vitals taken for this visit. Pain Assessment  4/10/2018   Location of Pain Back   Location Modifiers -   Severity of Pain 4   Quality of Pain Aching   Quality of Pain Comment numbness and tingling   Duration of Pain -   Frequency of Pain Constant   Aggravating Factors (No Data)   Aggravating Factors Comment pateint states getting out of bed triggers pain   Limiting Behavior -   Relieving Factors (No Data)   Relieving Factors Comment Pain Medication and Patches   Result of Injury -           Constitutional:  Well developed, well nourished, in no acute distress. Psychiatric: Affect and mood are appropriate. Integumentary: No rashes or abrasions noted on exposed areas. SPINE/MUSCULOSKELETAL EXAM    Cervical spine:  Neck is midline. Normal muscle tone. No focal atrophy is noted. ROM pain free. Shoulder ROM intact. No tenderness to palpation. Negative Spurling's sign. Negative Tinel's sign. Negative Tovar's sign.       Sensation in the bilateral arms grossly intact to light touch.       Lumbar spine:  No rash, ecchymosis, or gross obliquity. No fasciculations. No focal atrophy is noted. No pain with hip ROM. Full range of motion. No tenderness to palpation. No tenderness to palpation at the sciatic notch. SI joints non-tender. Trochanters non tender.      Decreased sensation L1/L2 on the right side.       MOTOR:      Biceps  Triceps Deltoids Wrist Ext Wrist Flex Hand Intrin   Right 5/5 5/5 5/5 5/5 5/5 5/5   Left 5/5 5/5 5/5 5/5 5/5 5/5             Hip Flex  Quads Hamstrings Ankle DF EHL Ankle PF   Right 5/5 5/5 5/5 5/5 5/5 5/5   Left 5/5 5/5 5/5 5/5 5/5 5/5     DTRs are 2+ biceps, triceps, brachioradialis, patella, and Achilles.      Negative Straight Leg raise. Squat not tested. No difficulty with tandem gait.       Ambulation without assistive device. FWB.       RADIOGRAPHS  Lumbar MRI images taken on 2/20/18 personally reviewed with patient:  FINDINGS:       Sagittal images show slightly straightened lordosis. There is no vertebral body  compression deformity or significant listhesis. There is a small amount of edema  within the posterior inferior margin of L4 and the superior posterior right  lateral margin of L5 adjacent. There is a low T1 and T2 signal 1 cm defect  within the superior midline L5 endplate which may be a Schmorl's node. Mild to  moderate disc space narrowing at this level. .       There is no other vertebral body edema. Disc desiccation with slight narrowing  L1-2.  Cauda equina tapers at L1-2 level.      Partially imaged kidneys show global relative mild to moderate atrophy of the  right kidney compared to the left, partially included. There are anterior and  posterior left parapelvic renal cysts, with a 6 cm lobulated posterior cortical  partially exophytic cyst. Abdominal CT from 2/2006 is not available in PACS for  comparison. Rosalino Sarabia  Correlation with axial images shows:      L1-2:  Mild diffuse disc bulge. No significant canal narrowing. There is mild  bilateral foraminal encroachment.      L2-3:  Posterior ligamentous thickening up to 5 mm. Minimal diffuse disc bulge. Central canal slightly narrowed to 10.8 mm. No significant foraminal stenosis.      L3-4:  Mild facet degenerative change with posterior ligamentous thickening up  to 5.5 mm. There is mild diffuse disc bulge. Central canal is mildly to  moderately narrowed down to 8.5 mm. There is a component of posterior epidural  lipomatosis. Mild lateral recess narrowing and mild bilateral foraminal  encroachment.      L4-5:  Fairly severe bilateral degenerative facet hypertrophy. There is  posterior ligamentous thickening up to 7 mm. There is a diffuse disc bulge at  this level which contacts and flattens the thecal sac from anterior. Central  canal is significantly narrowed down to 6 mm AP diameter. Moderate to severe  left-sided foraminal stenosis due to facet hypertrophy predominantly but also  disc bulge laterally which appears to contact the caudal surface of the exiting  nerve root. Similar findings on the right.      L5-S1:  Moderate bilateral degenerative facet hypertrophy with mild ligamentous  thickening. There is a broad-based posterior disc bulge or protrusion which  extends 4 mm into the canal. Central canal is mildly narrowed down to 10 mm  subsequent.  There is moderate to severe left and right foraminal stenosis due to  facet encroachment and endplate ridging and disc bulge.          IMPRESSION  IMPRESSION:      1.  Fairly severe central canal narrowing and bilateral foraminal stenosis L4-5 on a multifactorial degenerative basis as above. -There is some mild edema within the posterior adjoining endplates at this  level.      2. Broad-based posterior disc bulge or protrusion, with mild canal but fairly  severe bilateral neural foraminal narrowing L5-S1 as above. 20 minutes of face-to-face contact were spent with the patient during today's visit extensively discussing symptoms and treatment plan. All questions were answered. More than half of this visit today was spent on counseling.      Written by Nurys Escobar as dictated by Krishna Adair MD

## 2018-05-08 NOTE — MR AVS SNAPSHOT
[address] [place] [address] [address] [place] [address] 
[phone] Patient: Luis Mcwilliams MRN: [id number] [facility]:[birth year] Visit Information Date & Time Provider Department Dept. Phone Encounter #  
 5/8/2018  9:30 AM Rosa Plascencia MD [facility] [phone] [id number] Follow-up Instructions Return in about 3 months (around 8/8/2018). Your Appointments 5/23/2018 10:40 AM  
Office Visit with Alexis Moreno MD  
[facility]) Appt Note: 4 month follow up to check bp no labs per pt  
 [address] [facility], [address] [address] [address]  
  
   
 [address] [facility], [address] Upcoming Health Maintenance Date Due Influenza Age 5 to Adult 8/1/2018 COLONOSCOPY 10/25/2020 DTaP/Tdap/Td series (2 - Td) 1/9/2027 Allergies as of 5/8/2018  Review Complete On: 5/8/2018 By: Rosa Plascencia MD  
  
 Severity Noted Reaction Type Reactions Cymbalta [Duloxetine]  01/09/2017    Other (comments) Felt funny Current Immunizations  Reviewed on 1/20/2018 Name Date Influenza Vaccine 10/1/2017, 12/17/2012 Influenza Vaccine (Quad) PF 10/18/2016, 10/7/2015 Influenza Vaccine PF 10/27/2014, 10/25/2013 Influenza Vaccine Whole 12/19/2011 TD Vaccine 7/1/2005 Tdap 1/9/2017 Zoster Vaccine, Live 11/6/2013  4:10 PM  
  
 Not reviewed this visit You Were Diagnosed With   
  
 Codes Comments Lumbar radiculopathy    -  Primary ICD-10-CM: M54.16 
ICD-9-CM: 724.4 Spinal stenosis of lumbar region with neurogenic claudication     ICD-10-CM: M48.062 
ICD-9-CM: 724.03 Facet arthropathy ([facility] [address]     ICD-10-CM: M46.90 ICD-9-CM: 721.90 Spondylosis of lumbar region without myelopathy or radiculopathy     ICD-10-CM: M47.816 ICD-9-CM: 721.3 Vitals BP Pulse Temp Resp Height(growth percentile) Weight(growth percentile) 130/87 (BP 1 Location: Right arm, BP Patient Position: Sitting) 61 97.4 °F (36.3 °C) (Oral) 16 5' 10\" (1.778 m) 197 lb 6.4 oz (89.5 kg) SpO2 BMI Smoking Status 99% 28.32 kg/m2 Former Smoker BMI and BSA Data Body Mass Index Body Surface Area  
 28.32 kg/m 2 2.1 m 2 Preferred Pharmacy Pharmacy Name Phone [facility] [address], [address] [phone] Your Updated Medication List  
  
   
This list is accurate as of 5/8/18 10:18 AM.  Always use your most recent med list.  
  
  
  
  
 ascorbic acid (vitamin C) 500 mg tablet Commonly known as:  VITAMIN C Take  by mouth. CALCIUM 600 PO Take  by mouth. celecoxib 200 mg capsule Commonly known as:  CELEBREX  
TAKE ONE CAPSULE BY MOUTH DAILY chromium picolinate 1,000 mcg tablet Take 1 Tab by mouth daily. cyanocobalamin 1,000 mcg tablet Take 1,000 mcg by mouth daily. diclofenac 1 % Gel Commonly known as:  VOLTAREN Apply 4 grams to affected joint up to 4 times per day, maximum 16 grams per joint per day Dispense 5 100 gram tubes FISH  mg Cap Generic drug:  Omega-3 Fatty Acids Take 12,200 mg by mouth.  
  
 magnesium 200 mg Tab Take 400 mg by mouth.  
  
 multivitamin tablet Commonly known as:  ONE A DAY Take 1 Tab by mouth daily. omeprazole 40 mg capsule Commonly known as:  PRILOSEC  
TAKE ONE CAPSULE BY MOUTH DAILY  
  
 OTHER  
  
 traMADol 50 mg tablet Commonly known as:  ULTRAM  
Take 1 Tab by mouth every eight (8) hours as needed for Pain. Max Daily Amount: 150 mg.  
  
 TYLENOL EXTRA STRENGTH 500 mg tablet Generic drug:  acetaminophen Take 1,000 mg by mouth every six (6) hours as needed for Pain. VITAMIN D3 1,000 unit tablet Generic drug:  cholecalciferol Take 2,500 Units by mouth daily. Follow-up Instructions Return in about 3 months (around 8/8/2018). Patient Instructions Learning About a Facet Joint Injection What is a facet joint injection? A facet joint injection is a shot of medicine to help with pain from arthritis or other causes. The injection goes into your neck or back, depending on where your pain is. Facet joints connect your vertebrae to each other. Problems in these joints can cause long-term (chronic) pain in the neck or back, or sometimes in the shoulders, arms, buttocks, or legs. The injection contains a numbing medicine, which works right away for a short time. After the numbing medicine works, the doctor usually will add a steroid medicine to the injection. Steroids reduce swelling and pain, but they don't always work. How is a facet joint injection done? You may get medicine to help you relax. The doctor will use a tiny needle to numb the skin in the area where you are getting the facet joint injection. After the skin is numb, your doctor will use a larger needle for the actual facet joint injection. He or she will use X-rays to help guide the needle into the facet joint. You may feel some pressure. But you should not feel pain. The procedure takes 10 to 30 minutes. You will probably go home about an hour after your injection. What can you expect after a facet joint injection? You may have numbness for a few hours. The numbness could be in your neck or back, or your arm or leg, depending on where you got the shot. You will need someone to drive you home. Your pain may be gone right away. But it may return after a few hours or days. This is because the steroid medicine has not started to work yet. Steroids don't always work. And when they do, it takes a few days. But when they work, the pain relief can last for several days to a few months or longer. You may want to do less than normal for a few days.  But you may also be able to return to your daily routine. It's usually best to increase your activities slowly over time. Follow your doctor's instructions carefully. Follow-up care is a key part of your treatment and safety. Be sure to make and go to all appointments, and call your doctor if you are having problems. It's also a good idea to know your test results and keep a list of the medicines you take. Where can you learn more? Go to http://martina-dale.info/. Enter B481 in the search box to learn more about \"Learning About a Facet Joint Injection. \" Current as of: March 21, 2017 Content Version: 11.4 © 3926-8281 [employer]. Care instructions adapted under license by [employer] (which disclaims liability or warranty for this information). If you have questions about a medical condition or this instruction, always ask your healthcare professional. [place] [address] any warranty or liability for your use of this information. Learning About Medial Branch Block and Neurotomy What are medial branch block and neurotomy? Facet joints connect your vertebrae to each other. Problems in these joints can cause chronic (long-term) pain in the neck or back. They can sometimes affect the shoulders, arms, buttocks, or legs. Medial branch nerves are the nerves that carry many of the pain messages from your facet joints. Radiofrequency medial branch neurotomy is a type of medial branch neurotomy that is used to relieve arthritis pain. It uses radio waves to damage nerves in your neck or back so that they can no longer send pain messages to your brain. Before your doctor knows if a neurotomy will help you, he or she will do a medial branch block to find out if certain nerves are the ones that are a source of your pain. You will need two separate visits to the outpatient center or hospital to have both procedures. How is a medial branch block done? The doctor will use a tiny needle to numb the skin where you will get the block. Then he or she puts the block needle into the numbed area. You may feel some pressure, but you should not feel pain. Using fluoroscopy (live X-ray) to guide the needle, the doctor injects medicine onto one or more nerves to make them numb. If you get relief from your pain in the next 4 to 6 hours, it's a sign that those nerves may be contributing to your pain. The relief will last only a short time. You may then have a medial branch neurotomy at a later visit to try to get longer relief. It takes 20 to 30 minutes to get the block. You can go home after the doctor watches you for about an hour. You will get instructions on how to report how much pain you have when you are at home. You will need someone to drive you home. How is medial branch neurotomy done? The doctor will use a tiny needle to numb the skin where you will get the neurotomy. Then he or she puts the neurotomy needle into the numbed area. You may feel some pressure. Using fluoroscopy (live X-ray) to guide the needle, the doctor sends radio waves through the needle to the nerve for 60 to 90 seconds. The radio waves heat the nerve, which damages it. The doctor may do this several times. And he or she may treat more than one nerve. It takes 45 to 90 minutes to get a neurotomy, depending on how many nerves are heated. You will probably go home 30 to 60 minutes later. You will need someone to drive you home. What can you expect after a neurotomy? You may feel a little sore or tender at the injection site at first. But after a successful neurotomy, most people have pain relief right away. It often lasts for 9 to 12 months or longer. Sometimes the pain relief is permanent. If your pain does come back, it may mean that the damaged nerve has healed and can send pain messages again.  Or it can mean that a different nerve is causing pain. Your doctor will discuss your options with you. Follow-up care is a key part of your treatment and safety. Be sure to make and go to all appointments, and call your doctor if you are having problems. It's also a good idea to know your test results and keep a list of the medicines you take. Where can you learn more? Go to http://martina-dale.info/. Enter H097 in the search box to learn more about \"Learning About Medial Branch Block and Neurotomy. \" Current as of: October 14, 2016 Content Version: 11.4 © 5964-0102 [employer]. Care instructions adapted under license by [employer] (which disclaims liability or warranty for this information). If you have questions about a medical condition or this instruction, always ask your healthcare professional. [address] any warranty or liability for your use of this information. Please provide this summary of care documentation to your next provider. Your primary care clinician is listed as Yehuda Calle. If you have any questions after today's visit, please call [phone].

## 2018-05-17 DIAGNOSIS — M54.16 LUMBAR RADICULOPATHY: ICD-10-CM

## 2018-05-21 RX ORDER — GABAPENTIN 300 MG/1
CAPSULE ORAL
Qty: 28 CAP | Refills: 0 | OUTPATIENT
Start: 2018-05-21

## 2018-05-21 NOTE — TELEPHONE ENCOUNTER
Last Visit: 03/02/2018 with MD Salty Phelan    Next Appointment: noted to f/u in 4 weeks   Previous Refill Encounters: 02/15/2018 per MD Rodrigez #[id number]    Requested Prescriptions     Pending Prescriptions Disp Refills    gabapentin (NEURONTIN) 300 mg capsule [Pharmacy Med Name: GABAPENTIN 300 MG CAPSULE] 28 Cap 0     Sig: TAKE ONE CAPSULE BY MOUTH TWICE A DAY FOR 14 DAYS

## 2018-05-22 RX ORDER — GABAPENTIN 300 MG/1
300 CAPSULE ORAL 2 TIMES DAILY
Qty: 60 CAP | Refills: 0 | Status: SHIPPED | OUTPATIENT
Start: 2018-05-22 | End: 2018-06-18 | Stop reason: SDUPTHER

## 2018-05-22 NOTE — TELEPHONE ENCOUNTER
Patient's wife Dayanna Chauhan called requesting a refill of the patient's prescription Gabapentin 300 mg. She states it was denied through the Orthopaedic side so the patient needs to get it from the Spine side. Please advise Corazon when completed at [phone].

## 2018-05-22 NOTE — TELEPHONE ENCOUNTER
Chon Garcia PA-C      12:20 PM   Note      Obtain from pcp or spine        Last Visit: 05/08/2018 with MD Briscoe Rather    Next Appointment: 08/07/2018 with MD Briscoe Rather     Requested Prescriptions     Pending Prescriptions Disp Refills    gabapentin (NEURONTIN) 300 mg capsule 60 Cap 0     Sig: Take 1 Cap by mouth two (2) times a day.

## 2018-05-23 NOTE — TELEPHONE ENCOUNTER
Notified patient refill denied and he needs to get from Spine or PCP. Patient did get it from Spine.

## 2018-06-18 RX ORDER — GABAPENTIN 300 MG/1
300 CAPSULE ORAL 2 TIMES DAILY
Qty: 60 CAP | Refills: 1 | Status: SHIPPED | OUTPATIENT
Start: 2018-06-18 | End: 2018-06-21 | Stop reason: SDUPTHER

## 2018-06-18 NOTE — TELEPHONE ENCOUNTER
Last Visit: 05/08/2018 with MD Josh Washington    Next Appointment: 08/07/2018 with MD Josh Washington   Previous Refill Encounters: 05/22/2018 per DANIEL Flanagan [facility] #[id number]     Requested Prescriptions     Pending Prescriptions Disp Refills    gabapentin (NEURONTIN) 300 mg capsule 60 Cap 1     Sig: Take 1 Cap by mouth two (2) times a day.

## 2018-06-21 RX ORDER — GABAPENTIN 300 MG/1
300 CAPSULE ORAL 2 TIMES DAILY
Qty: 180 CAP | Refills: 0 | Status: SHIPPED | OUTPATIENT
Start: 2018-06-21 | End: 2019-01-08 | Stop reason: SDUPTHER

## 2018-06-21 NOTE — TELEPHONE ENCOUNTER
Insurance requires a minimum fill for 90 days. If appropriate, please sign pended medication order. If not, please notify me. Requested Prescriptions     Pending Prescriptions Disp Refills    gabapentin (NEURONTIN) 300 mg capsule 180 Cap 0     Sig: Take 1 Cap by mouth two (2) times a day. Signed Prescriptions Disp Refills    gabapentin (NEURONTIN) 300 mg capsule 60 Cap 1     Sig: Take 1 Cap by mouth two (2) times a day.      Authorizing Provider: Amadou Castillo

## 2018-08-20 RX ORDER — GABAPENTIN 300 MG/1
CAPSULE ORAL
Qty: 60 CAP | Refills: 0 | Status: SHIPPED | OUTPATIENT
Start: 2018-08-20 | End: 2019-01-08 | Stop reason: SDUPTHER

## 2018-11-12 DIAGNOSIS — M19.90 ARTHRITIS: ICD-10-CM

## 2018-11-12 RX ORDER — CELECOXIB 200 MG/1
CAPSULE ORAL
Qty: 90 CAP | Refills: 1 | Status: SHIPPED | OUTPATIENT
Start: 2018-11-12 | End: 2019-02-12

## 2019-01-07 RX ORDER — GABAPENTIN 300 MG/1
CAPSULE ORAL
Qty: 60 CAP | Refills: 0 | OUTPATIENT
Start: 2019-01-07

## 2019-01-08 RX ORDER — GABAPENTIN 300 MG/1
CAPSULE ORAL
Qty: 60 CAP | Refills: 1 | Status: SHIPPED | OUTPATIENT
Start: 2019-01-08 | End: 2019-02-12

## 2019-01-08 NOTE — TELEPHONE ENCOUNTER
Patient's wife María Lama called stating the patient is almost out of his prescription gabapentin (NEURONTIN) 300 mg capsule. He only has enough pills to last him for a couple of days. The only days this week that are available for him to be seen by a nurse practitioner is tomorrow 01/09/19 and 01/10/19, but both of those days are booked up. She is wondering if one of the nurse practitioners wouldn't mind adding him on to their schedule for one of those days so he doesn't run out. Please advise Corazon back at [phone].

## 2019-01-08 NOTE — TELEPHONE ENCOUNTER
I have approved this script due to our schedules. Please make a med FU in the next 1-2 months before the rx and refill runs out.

## 2019-01-24 ENCOUNTER — OFFICE VISIT (OUTPATIENT)
Dept: INTERNAL MEDICINE CLINIC | Age: 64
End: 2019-01-24

## 2019-01-24 VITALS
HEIGHT: 70 IN | RESPIRATION RATE: 14 BRPM | SYSTOLIC BLOOD PRESSURE: 126 MMHG | DIASTOLIC BLOOD PRESSURE: 89 MMHG | TEMPERATURE: 97.6 F | OXYGEN SATURATION: 97 % | BODY MASS INDEX: 29.06 KG/M2 | HEART RATE: 65 BPM | WEIGHT: 203 LBS

## 2019-01-24 DIAGNOSIS — N28.1 KIDNEY CYST, ACQUIRED: ICD-10-CM

## 2019-01-24 DIAGNOSIS — K42.9 UMBILICAL HERNIA WITHOUT OBSTRUCTION AND WITHOUT GANGRENE: ICD-10-CM

## 2019-01-24 DIAGNOSIS — J18.9 COMMUNITY ACQUIRED PNEUMONIA OF RIGHT LUNG, UNSPECIFIED PART OF LUNG: Primary | ICD-10-CM

## 2019-01-24 RX ORDER — BENZONATATE 100 MG/1
100 CAPSULE ORAL
Qty: 30 CAP | Refills: 1 | Status: SHIPPED | OUTPATIENT
Start: 2019-01-24 | End: 2019-01-31

## 2019-01-24 RX ORDER — AZITHROMYCIN 250 MG/1
250 TABLET, FILM COATED ORAL DAILY
COMMUNITY
End: 2019-02-07 | Stop reason: ALTCHOICE

## 2019-01-24 NOTE — PROGRESS NOTES
61 y.o. WHITE OR  male who presents for evaluation. He is here to follow-up after his urgent care visit. For 3 weeks now, he and his wife have had upper respiratory complaints. Self treating with OTC meds and some leftover Tessalon, he finally ended up going to urgent care on 1/18/19. He was having some chest discomfort. Labs, EKG negative. Chest x-ray initially read as negative. However, they called him back a few days later saying that the radiology official reading showed right-sided pneumonia and they gave him Z-Shorty which he started about 4 days ago. He is markedly better, the coughing has improved substantially. No wheezing or dyspnea. He reports that the umbilical hernia that have been noted a while back and for which she saw Dr. Butch Hodge years ago is asymptomatic. Wants referral back Lastly, he apparently had MRI done by Dr. Randee Chisholm 2/1 8 which did reveal lumbar degenerative changes. There is mention of an exophytic cyst that he was supposed to call us about but never did. He wants to have that followed up now. Past Medical History:  
Diagnosis Date  Arthritis   
 left knee Dr Linsey Santo 2015  Bright's disease   
 as a child  Degenerative arthritis of lumbar spine 02/2018 Dr Randee Chisholm; mri showed multilevel disease, bilat foraminal stenosis L4-5, severe bilat foraminal narrowing L5-S1  Diverticulosis 10/10 Dr. Shamar Cole  Fatty liver 11/00 Fib-4 was 0.54 from 10/14  GERD (gastroesophageal reflux disease)   
 and HH on UGI 11/00  
 Hip bursitis Dr Linsey Santo left 2015  Prediabetes 2011  Rhinophyma  S/P cardiac cath 2000  
 nl, EF 60% Dr. Lissette Peña  Umbilical hernia Past Surgical History:  
Procedure Laterality Date  HX COLONOSCOPY Dr. Shamar Cole 10/10 diverticulosis  HX ORTHOPAEDIC  2012 Dr. Mary Bender left distal clavicle excision and acromioplasty Social History Socioeconomic History  Marital status:   
 Spouse name: Not on file  Number of children: 3  
 Years of education: Not on file  Highest education level: Not on file Social Needs  Financial resource strain: Not on file  Food insecurity - worry: Not on file  Food insecurity - inability: Not on file  Transportation needs - medical: Not on file  Transportation needs - non-medical: Not on file Occupational History  Occupation: mgr at [employer] Tobacco Use  Smoking status: Former Smoker Last attempt to quit: 2005 Years since quittin.0  Smokeless tobacco: Never Used  Tobacco comment: 60+ py  
Substance and Sexual Activity  Alcohol use: No  
 Drug use: No  
 Sexual activity: Not on file Other Topics Concern  Not on file Social History Narrative  Not on file Current Outpatient Medications Medication Sig  
 azithromycin (ZITHROMAX) 250 mg tablet Take 250 mg by mouth daily.  benzonatate (TESSALON PERLES) 100 mg capsule Take 1 Cap by mouth three (3) times daily as needed for Cough for up to 7 days.  gabapentin (NEURONTIN) 300 mg capsule TAKE ONE CAPSULE BY MOUTH TWICE A DAY  celecoxib (CELEBREX) 200 mg capsule TAKE ONE CAPSULE BY MOUTH DAILY  omeprazole (PRILOSEC) 40 mg capsule TAKE ONE CAPSULE BY MOUTH DAILY  acetaminophen (TYLENOL EXTRA STRENGTH) 500 mg tablet Take 1,000 mg by mouth every six (6) hours as needed for Pain.  cholecalciferol (VITAMIN D3) 1,000 unit tablet Take 2,500 Units by mouth daily.  CALCIUM CARBONATE (CALCIUM 600 PO) Take  by mouth.  Omega-3 Fatty Acids (FISH OIL) 500 mg cap Take 12,200 mg by mouth.  magnesium 200 mg tab Take 400 mg by mouth.  ascorbic acid (VITAMIN C) 500 mg tablet Take  by mouth.  multivitamin (ONE A DAY) tablet Take 1 Tab by mouth daily.  cyanocobalamin 1,000 mcg tablet Take 1,000 mcg by mouth daily.   
 traMADol (ULTRAM) 50 mg tablet Take 1 Tab by mouth every eight (8) hours as needed for Pain. Max Daily Amount: 150 mg. (Patient taking differently: Take 50 mg by mouth every eight (8) hours as needed for Pain.)  chromium picolinate 1,000 mcg tablet Take 1 Tab by mouth daily.  OTHER   
 diclofenac (VOLTAREN) 1 % topical gel Apply 4 grams to affected joint up to 4 times per day, maximum 16 grams per joint per day Dispense 5 100 gram tubes (Patient taking differently: Apply 4 grams to affected joint up to 4 times per day, maximum 16 grams per joint per day Dispense 5 100 gram tubes) No current facility-administered medications for this visit. Allergies Allergen Reactions  Cymbalta [Duloxetine] Other (comments) Felt funny REVIEW OF SYSTEMS:  
Ophtho  no vision change or eye pain Oral  no mouth pain, tongue or tooth problems Ears  no hearing loss, ear pain, fullness, no swallowing problems Cardiac  no CP, PND, orthopnea, edema, palpitations or syncope Chest  no breast masses GI  no heartburn, nausea, vomiting, change in bowel habits, bleeding, hemorrhoids Urinary  no dysuria, hematuria, flank pain, urgency, frequency Visit Vitals /89 Pulse 65 Temp 97.6 °F (36.4 °C) (Oral) Resp 14 Ht 5' 10\" (1.778 m) Wt 203 lb (92.1 kg) SpO2 97% BMI 29.13 kg/m² A&O x3 Affect is appropriate. Mood stable No apparent distress Anicteric, no JVD, adenopathy or thyromegaly. No carotid bruits or radiated murmur Lungs clear to auscultation, no wheezes or rales Heart showed regular rate and rhythm. No murmur, rubs, gallops Abdomen soft nontender, no hepatosplenomegaly or masses. Extremities without edema. Pulses 1-2+ symmetrically Assessment and plan: 1. Apparent pneumonia. Finish antibiotics, clinically much improved 2. Questionable exophytic cyst left kidney. We will do follow-up ultrasound and send to urology as needed 3. Symptomatic umbilical hernia. Refer back to Dr. Mily Power 
 
 
RTC 2/19 already scheduled Above conditions discussed at length and patient vocalized understanding. All questions answered to patient satisfaction

## 2019-01-24 NOTE — PROGRESS NOTES
1. Have you been to the ER, urgent care clinic or hospitalized since your last visit? YES. [facility] 1/18/19 
 
2. Have you seen or consulted any other health care providers outside of the [address] [place] [address] since your last visit (Include any pap smears or colon screening)? NO Chief Complaint Patient presents with  Pneumonia  
  [facility] for [facility] pain but after labs, ekg and xray he was diagnosed with pneumonia

## 2019-01-31 ENCOUNTER — HOSPITAL ENCOUNTER (OUTPATIENT)
Dept: ULTRASOUND IMAGING | Age: 64
Discharge: HOME | End: 2019-01-31
Attending: INTERNAL MEDICINE
Payer: COMMERCIAL

## 2019-01-31 ENCOUNTER — HOSPITAL ENCOUNTER (OUTPATIENT)
Dept: ULTRASOUND IMAGING | Age: 64
Discharge: HOME | End: 2019-01-31
Attending: SURGERY
Payer: COMMERCIAL

## 2019-01-31 DIAGNOSIS — N28.1 KIDNEY CYST, ACQUIRED: ICD-10-CM

## 2019-01-31 DIAGNOSIS — R10.10 UPPER ABDOMINAL PAIN: ICD-10-CM

## 2019-01-31 PROCEDURE — 76700 US EXAM ABDOM COMPLETE: CPT

## 2019-02-04 ENCOUNTER — LAB ONLY (OUTPATIENT)
Dept: INTERNAL MEDICINE CLINIC | Age: 64
End: 2019-02-04

## 2019-02-04 ENCOUNTER — HOSPITAL ENCOUNTER (OUTPATIENT)
Dept: LAB | Age: 64
Discharge: HOME | End: 2019-02-04
Payer: COMMERCIAL

## 2019-02-04 DIAGNOSIS — R73.03 PREDIABETES: ICD-10-CM

## 2019-02-04 DIAGNOSIS — E78.5 HYPERLIPIDEMIA, UNSPECIFIED HYPERLIPIDEMIA TYPE: ICD-10-CM

## 2019-02-04 DIAGNOSIS — Z00.00 ROUTINE GENERAL MEDICAL EXAMINATION AT A HEALTH CARE FACILITY: Primary | ICD-10-CM

## 2019-02-04 DIAGNOSIS — Z00.00 ROUTINE GENERAL MEDICAL EXAMINATION AT A HEALTH CARE FACILITY: ICD-10-CM

## 2019-02-04 DIAGNOSIS — I10 PRIMARY HYPERTENSION: ICD-10-CM

## 2019-02-04 LAB
ALBUMIN SERPL-MCNC: 4 G/DL (ref 3.4–5)
ALBUMIN/GLOB SERPL: 1.4 {RATIO} (ref 0.8–1.7)
ALP SERPL-CCNC: 87 U/L (ref 45–117)
ALT SERPL-CCNC: 58 U/L (ref 16–61)
ANION GAP SERPL CALC-SCNC: 7 MMOL/L (ref 3–18)
AST SERPL-CCNC: 25 U/L (ref 15–37)
BASOPHILS # BLD: 0 K/UL (ref 0–0.1)
BASOPHILS NFR BLD: 1 % (ref 0–2)
BILIRUB SERPL-MCNC: 0.7 MG/DL (ref 0.2–1)
BUN SERPL-MCNC: 22 MG/DL (ref 7–18)
BUN/CREAT SERPL: 23 (ref 12–20)
CALCIUM SERPL-MCNC: 8.7 MG/DL (ref 8.5–10.1)
CHLORIDE SERPL-SCNC: 108 MMOL/L (ref 100–108)
CHOLEST SERPL-MCNC: 147 MG/DL
CO2 SERPL-SCNC: 25 MMOL/L (ref 21–32)
CREAT SERPL-MCNC: 0.94 MG/DL (ref 0.6–1.3)
DIFFERENTIAL METHOD BLD: ABNORMAL
EOSINOPHIL # BLD: 0.2 K/UL (ref 0–0.4)
EOSINOPHIL NFR BLD: 3 % (ref 0–5)
ERYTHROCYTE [DISTWIDTH] IN BLOOD BY AUTOMATED COUNT: 13.3 % (ref 11.6–14.5)
EST. AVERAGE GLUCOSE BLD GHB EST-MCNC: 128 MG/DL
GLOBULIN SER CALC-MCNC: 2.8 G/DL (ref 2–4)
GLUCOSE SERPL-MCNC: 116 MG/DL (ref 74–99)
HBA1C MFR BLD: 6.1 % (ref 4.2–5.6)
HCT VFR BLD AUTO: 44.4 % (ref 36–48)
HDLC SERPL-MCNC: 47 MG/DL (ref 40–60)
HDLC SERPL: 3.1 {RATIO} (ref 0–5)
HGB BLD-MCNC: 14.9 G/DL (ref 13–16)
LDLC SERPL CALC-MCNC: 88.2 MG/DL (ref 0–100)
LIPID PROFILE,FLP: NORMAL
LYMPHOCYTES # BLD: 2.1 K/UL (ref 0.9–3.6)
LYMPHOCYTES NFR BLD: 39 % (ref 21–52)
MCH RBC QN AUTO: 32 PG (ref 24–34)
MCHC RBC AUTO-ENTMCNC: 33.6 G/DL (ref 31–37)
MCV RBC AUTO: 95.3 FL (ref 74–97)
MONOCYTES # BLD: 0.3 K/UL (ref 0.05–1.2)
MONOCYTES NFR BLD: 6 % (ref 3–10)
NEUTS SEG # BLD: 2.9 K/UL (ref 1.8–8)
NEUTS SEG NFR BLD: 51 % (ref 40–73)
PLATELET # BLD AUTO: 248 K/UL (ref 135–420)
PMV BLD AUTO: 10 FL (ref 9.2–11.8)
POTASSIUM SERPL-SCNC: 4.4 MMOL/L (ref 3.5–5.5)
PROT SERPL-MCNC: 6.8 G/DL (ref 6.4–8.2)
RBC # BLD AUTO: 4.66 M/UL (ref 4.7–5.5)
SODIUM SERPL-SCNC: 140 MMOL/L (ref 136–145)
TRIGL SERPL-MCNC: 59 MG/DL (ref ?–150)
TSH SERPL DL<=0.05 MIU/L-ACNC: 1.3 UIU/ML (ref 0.36–3.74)
VLDLC SERPL CALC-MCNC: 11.8 MG/DL
WBC # BLD AUTO: 5.5 K/UL (ref 4.6–13.2)

## 2019-02-04 PROCEDURE — 80053 COMPREHEN METABOLIC PANEL: CPT

## 2019-02-04 PROCEDURE — 83036 HEMOGLOBIN GLYCOSYLATED A1C: CPT

## 2019-02-04 PROCEDURE — 36415 COLL VENOUS BLD VENIPUNCTURE: CPT

## 2019-02-04 PROCEDURE — 85025 COMPLETE CBC W/AUTO DIFF WBC: CPT

## 2019-02-04 PROCEDURE — 84443 ASSAY THYROID STIM HORMONE: CPT

## 2019-02-04 PROCEDURE — 80061 LIPID PANEL: CPT

## 2019-02-04 PROCEDURE — 82306 VITAMIN D 25 HYDROXY: CPT

## 2019-02-05 LAB — 25(OH)D3 SERPL-MCNC: 35.2 NG/ML (ref 30–100)

## 2019-02-06 ENCOUNTER — HOSPITAL ENCOUNTER (OUTPATIENT)
Dept: NUCLEAR MEDICINE | Age: 64
Discharge: HOME | End: 2019-02-06
Attending: SURGERY
Payer: COMMERCIAL

## 2019-02-06 VITALS — WEIGHT: 198 LBS | BODY MASS INDEX: 28.41 KG/M2

## 2019-02-06 DIAGNOSIS — K82.9 DISEASE OF GALLBLADDER: ICD-10-CM

## 2019-02-06 DIAGNOSIS — K83.8 BILIARY SLUDGE: ICD-10-CM

## 2019-02-06 PROCEDURE — 78227 HEPATOBIL SYST IMAGE W/DRUG: CPT

## 2019-02-06 PROCEDURE — 74011250636 HC RX REV CODE- 250/636: Performed by: SURGERY

## 2019-02-06 RX ORDER — SODIUM CHLORIDE 0.9 % (FLUSH) 0.9 %
5-10 SYRINGE (ML) INJECTION
Status: COMPLETED | OUTPATIENT
Start: 2019-02-06 | End: 2019-02-06

## 2019-02-06 RX ADMIN — Medication 50 ML: at 14:00

## 2019-02-06 RX ADMIN — SINCALIDE 1.8 MCG: 5 INJECTION, POWDER, LYOPHILIZED, FOR SOLUTION INTRAVENOUS at 13:49

## 2019-02-07 ENCOUNTER — OFFICE VISIT (OUTPATIENT)
Dept: ORTHOPEDIC SURGERY | Age: 64
End: 2019-02-07

## 2019-02-07 VITALS
SYSTOLIC BLOOD PRESSURE: 138 MMHG | BODY MASS INDEX: 28.63 KG/M2 | HEART RATE: 65 BPM | DIASTOLIC BLOOD PRESSURE: 88 MMHG | WEIGHT: 200 LBS | HEIGHT: 70 IN

## 2019-02-07 DIAGNOSIS — M47.816 SPONDYLOSIS OF LUMBAR REGION WITHOUT MYELOPATHY OR RADICULOPATHY: ICD-10-CM

## 2019-02-07 DIAGNOSIS — M54.2 CERVICAL PAIN: Primary | ICD-10-CM

## 2019-02-07 DIAGNOSIS — M47.819 FACET ARTHROPATHY: ICD-10-CM

## 2019-02-07 DIAGNOSIS — M79.18 CERVICAL MYOFASCIAL PAIN SYNDROME: ICD-10-CM

## 2019-02-07 DIAGNOSIS — M48.062 SPINAL STENOSIS OF LUMBAR REGION WITH NEUROGENIC CLAUDICATION: ICD-10-CM

## 2019-02-07 DIAGNOSIS — M54.16 LUMBAR RADICULOPATHY: ICD-10-CM

## 2019-02-07 RX ORDER — PREDNISONE 10 MG/1
TABLET ORAL
Qty: 21 TAB | Refills: 0 | Status: SHIPPED | OUTPATIENT
Start: 2019-02-07 | End: 2019-02-25

## 2019-02-07 RX ORDER — GABAPENTIN 300 MG/1
300 CAPSULE ORAL 3 TIMES DAILY
Qty: 90 CAP | Refills: 11 | Status: SHIPPED | OUTPATIENT
Start: 2019-02-07 | End: 2019-05-07 | Stop reason: DRUGHIGH

## 2019-02-07 NOTE — PROGRESS NOTES
61 y.o. WHITE OR  male who presents for RPE. His wife is here with him today Denies any cardiovascular complaints. He is still not exercising much and bis bp remains high. Open to taking meds now Denies polyuria, polydipsia, nocturia, vision change. Not checking sugars at this time. No success with attempts at wt loss Vitals 2/12/2019 2/7/2019 2/7/2019 2/6/2019 1/24/2019 5/8/2018 Weight 205 lb  200 lb 198 lb 203 lb 197 lb 6.4 oz No  complaints. The gerd is controlled on ppi. However, he's been having right sided abd pain so US was ordered by Dr Parviz Mooney which showed gb sludge, hepatic steatosis with hepatomegaly, 2 left renal and 1 right renal cyst.  HIDA scan was negative. He continues to have pain, was supposed to have hernia repair but Dr Parviz Mooney wanted appendicitis to be ruled out first(?). Pt pain is mostly right upper quadrant, no an/v or association w eating, urinary complaints. No fever, wt loss, change in bowel habits, bleeding He has not been using the celebrex. He has been seeing Dr Jeannie Fairchild He was treated by urgent care for right sided pneumonia in mid January and is concerned that he's still coughing. No fever, minimal sputum, no sob, wheezing, hemoptysis Past Medical History:  
Diagnosis Date  Arthritis   
 left knee Dr Meg Olivares 2015  Bright's disease   
 as a child  Degenerative arthritis of lumbar spine 02/2018 Dr Jeannie Fairchild; [facility] showed multilevel disease, bilat foraminal stenosis L4-5, severe bilat foraminal narrowing L5-S1; s/p epidural 4/18  Diverticulosis 10/10 Dr. Campuzano Backbone  Fatty liver 11/00 Fib-4 was 0.54 from 10/14; US 2/18 showed fatty liver and hepatomegaly  GERD (gastroesophageal reflux disease)   
 and HH on UGI 11/00  
 Hip bursitis Dr Meg Olivares left 2015  Hyperlipidemia   
 calculated 10 yr risk score 15.2% (2/19)  Prediabetes 2011  Renal cysts, acquired, bilateral 02/2018 Us showed 1.4cm cyst right, 7 cm and 2.5 cm cysts on left  Rhinophyma  S/P cardiac cath   
 nl, EF 60% Dr. Allyson Conway  Umbilical hernia Past Surgical History:  
Procedure Laterality Date  HX COLONOSCOPY Dr. Campuzano Backbone 10/10 diverticulosis  HX ORTHOPAEDIC  2012 Dr. Elvis Gonzalez left distal clavicle excision and acromioplasty Social History Socioeconomic History  Marital status:  Spouse name: Not on file  Number of children: 3  
 Years of education: Not on file  Highest education level: Not on file Social Needs  Financial resource strain: Not on file  Food insecurity - worry: Not on file  Food insecurity - inability: Not on file  Transportation needs - medical: Not on file  Transportation needs - non-medical: Not on file Occupational History  Occupation: mgr at [employer] Tobacco Use  Smoking status: Former Smoker Last attempt to quit: 2005 Years since quittin.1  Smokeless tobacco: Never Used  Tobacco comment: 60+ py  
Substance and Sexual Activity  Alcohol use: No  
 Drug use: No  
 Sexual activity: Not on file Other Topics Concern  Not on file Social History Narrative  Not on file Family History Problem Relation Age of Onset  Other Father   
     cardiomyopathy  Heart Disease Brother Immunization History Administered Date(s) Administered  Influenza Vaccine 2012, 10/01/2017  Influenza Vaccine (Quad) PF 10/07/2015, 10/18/2016  Influenza Vaccine PF 10/25/2013, 10/27/2014  Influenza Vaccine Whole 2011  TD Vaccine 2005  Tdap 2017  Zoster Vaccine, Live 2013 Current Outpatient Medications Medication Sig  
 amLODIPine (NORVASC) 5 mg tablet Take 1 Tab by mouth daily.  gabapentin (NEURONTIN) 300 mg capsule Take 1 Cap by mouth three (3) times daily.   
 predniSONE (STERAPRED DS) 10 mg dose pack See administration instruction per 10mg dose pack  omeprazole (PRILOSEC) 40 mg capsule TAKE ONE CAPSULE BY MOUTH DAILY  acetaminophen (TYLENOL EXTRA STRENGTH) 500 mg tablet Take 1,000 mg by mouth every six (6) hours as needed for Pain.  cholecalciferol (VITAMIN D3) 1,000 unit tablet Take 2,500 Units by mouth daily.  Omega-3 Fatty Acids (FISH OIL) 500 mg cap Take 12,200 mg by mouth.  magnesium 200 mg tab Take 400 mg by mouth.  ascorbic acid (VITAMIN C) 500 mg tablet Take  by mouth.  multivitamin (ONE A DAY) tablet Take 1 Tab by mouth daily.  cyanocobalamin 1,000 mcg tablet Take 1,000 mcg by mouth daily. No current facility-administered medications for this visit. Allergies Allergen Reactions  Cymbalta [Duloxetine] Other (comments) Felt funny REVIEW OF SYSTEMS: colo 10/10 Dr Curry [place] Ophtho  no vision change or eye pain Oral  no mouth pain, tongue or tooth problems Ears  no hearing loss, ear pain, fullness, no swallowing problems Cardiac  no CP, PND, orthopnea, edema, palpitations or syncope Chest  no breast masses Resp  no wheezing, chronic coughing, dyspnea GI  no nausea, vomiting, change in bowel habits, bleeding, hemorrhoids Urinary  no dysuria, hematuria, flank pain, urgency, frequency Genitals  no genital lesions, discharge, masses, ulceration, warts Ortho  no swelling, dec ROM, myalgias Derm  no nail abnormalities, rashes, lesions of note, hair loss Psych  denies any anxiety or depression symptoms, no hallucinations or violent ideation Constitutional  no wt loss, night sweats, unexplained fevers Neuro  no focal weakness, numbness, paresthesias, incoordination, ataxia, involuntary movements Endo - no polyuria, polydipsia, nocturia, hot flashes Visit Vitals BP (!) 162/98 Pulse (!) 57 Temp 97.7 °F (36.5 °C) (Oral) Resp 14 Ht 5' 10\" (1.778 m) Wt 205 lb (93 kg) SpO2 96% BMI 29.41 kg/m² Affect is appropriate. Mood stableNo apparent distress HEENT --Anicteric sclerae, No thyromegaly, JVD, or bruits. Lungs --Clear to auscultation and percussion, normal percussion. Heart --Regular rate and rhythm, no murmurs, rubs, gallops, or clicks. Chest wall --Nontender to palpation. PMI normal. 
Abdomen -- Soft and nondistended, no hepatosplenomegaly or masses. Umbilical hernia noted. No rebound or guarding. Mild diffuse right sided tenderness. Prostate  -- no asymmetry, nodularity, tenderness or enlargement Rectal  -- normal tone, guaiac negative brown stool Extremities -- Without cyanosis, clubbing, edema. 2+ pulses equally and bilaterally. LABS From 12/10 showed gluc 118, cr 1.10,             alt 51, hba1c 6.1,       wbc 7.9, hb 16.0, plt 261, psa 1.2, ua neg From 7/11 showed                                         2hr GTT 58 From 5/12 showed   gluc 114, cr 1.10, gfr>60,                       wbc 7.0, hb 15.2, plt 240 From 10/13 showed gluc 111, cr 0.92, gfr 91,  alt 30,          chol 169, tg 47, hdl 51, ldl-c 109, wbc 5.8, hb 15.2, plt 226, psa 1.2 From 10/14 showed gluc 102, cr 1.04, gfr>60, alt 31,          chol 177, tg 97, hdl 45, ldl-c 113, wbc 7.0, hb 15.5, plt 289, k 5.9 From 10/14 showed        hba1c 6.3 From 12/15 showed gluc 110, cr 1.17, gfr>60, alt 52, hba1c 6.2, chol 179, tg 79, hdl 53, ldl-c 110, wbc 5.8, hb 16.6, plt 244, ua neg From 1/17 showed   gluc 116, cr 0.98, gfr>60, alt 67,          chol 165, tg 177, hdl 48, ldl-c 82, wbc 6.6, hb 16.0, plt 229, ua neg From 1/18 showed   gluc 109, cr 0.95, gfr>60, alt 64, hba1c 6.1, chol 128, tg 163, hdl 44, ldl-c 63, wbc 6.8, hb 15.7, plt 238, vit d 33.9, tsh 1.39, ast 22, ap 75, tb 06 Results for orders placed or performed during the hospital encounter of 02/04/19 CBC WITH AUTOMATED DIFF Result Value Ref Range WBC 5.5 4.6 - 13.2 K/uL  
 RBC 4.66 (L) 4.70 - 5.50 M/uL  
 HGB 14.9 13.0 - 16.0 g/dL HCT 44.4 36.0 - 48.0 %  MCV 95.3 74.0 - 97.0 FL  
 MCH 32.0 24.0 - 34.0 PG  
 MCHC 33.6 31.0 - 37.0 g/dL  
 RDW 13.3 11.6 - 14.5 % PLATELET 572 890 - 566 K/uL MPV 10.0 9.2 - 11.8 FL  
 NEUTROPHILS 51 40 - 73 % LYMPHOCYTES 39 21 - 52 % MONOCYTES 6 3 - 10 % EOSINOPHILS 3 0 - 5 % BASOPHILS 1 0 - 2 %  
 ABS. NEUTROPHILS 2.9 1.8 - 8.0 K/UL  
 ABS. LYMPHOCYTES 2.1 0.9 - 3.6 K/UL  
 ABS. MONOCYTES 0.3 0.05 - 1.2 K/UL  
 ABS. EOSINOPHILS 0.2 0.0 - 0.4 K/UL  
 ABS. BASOPHILS 0.0 0.0 - 0.1 K/UL  
 DF AUTOMATED METABOLIC PANEL, COMPREHENSIVE Result Value Ref Range Sodium 140 136 - 145 mmol/L Potassium 4.4 3.5 - 5.5 mmol/L Chloride 108 100 - 108 mmol/L  
 CO2 25 21 - 32 mmol/L Anion gap 7 3.0 - 18 mmol/L Glucose 116 (H) 74 - 99 mg/dL BUN 22 (H) 7.0 - 18 MG/DL Creatinine 0.94 0.6 - 1.3 MG/DL  
 BUN/Creatinine ratio 23 (H) 12 - 20 GFR est AA >60 >60 ml/min/1.73m2 GFR est non-AA >60 >60 ml/min/1.73m2 Calcium 8.7 8.5 - 10.1 MG/DL Bilirubin, total 0.7 0.2 - 1.0 MG/DL  
 ALT (SGPT) 58 16 - 61 U/L  
 AST (SGOT) 25 15 - 37 U/L Alk. phosphatase 87 45 - 117 U/L Protein, total 6.8 6.4 - 8.2 g/dL Albumin 4.0 3.4 - 5.0 g/dL Globulin 2.8 2.0 - 4.0 g/dL A-G Ratio 1.4 0.8 - 1.7 LIPID PANEL Result Value Ref Range LIPID PROFILE Cholesterol, total 147 <200 MG/DL Triglyceride 59 <150 MG/DL  
 HDL Cholesterol 47 40 - 60 MG/DL  
 LDL, calculated 88.2 0 - 100 MG/DL VLDL, calculated 11.8 MG/DL  
 CHOL/HDL Ratio 3.1 0 - 5.0 TSH 3RD GENERATION Result Value Ref Range TSH 1.30 0.36 - 3.74 uIU/mL VITAMIN D, 25 HYDROXY Result Value Ref Range Vitamin D 25-Hydroxy 35.2 30 - 100 ng/mL HEMOGLOBIN A1C WITH EAG Result Value Ref Range Hemoglobin A1c 6.1 (H) 4.2 - 5.6 % Est. average glucose 128 mg/dL Calculated 10 year risk score 15.2% Patient Active Problem List  
Diagnosis Code  Prediabetes 2011 R73.03  
 Arthritis Dr Aura Polo M19.90  GERD without esophagitis K21.9  Diverticulosis of colon without hemorrhage K57.30  Hyperlipidemia E78.5  Primary hypertension I10  Renal cysts, acquired, bilateral N28.1  Degenerative arthritis of lumbar spine M47.816 Assessment and plan: 
1. GERD. Continue curren 2. Ortho. Per Dr Adela Wheeler 3. Diverticulosis. Fiber, colo 2020 4. Prediabetes. Continue lifestyle and dietary measures. Previous long discussion about typical progression of preDM. Wt loss again reiterated 5. l spine disease. Per Dr Rayray Woodard 6. Lipids. Will recheck at next visit, declined statin for now 7. HTN. Start norvasc, f/u 4 mos 8. Abd pain, etiology unclear. Will sched CT abd/pelvis per Dr Gallo Hint request 
9. Persistent cough. CXR to be done, CT if abn 10. Renal cysts. CT as above RTC 6/19 Above conditions discussed at length and patient vocalized understanding. All questions answered to patient satisfaction TOTAL time 40 min spent of which at least 30 min was on counseling, answering questions and coordination of care

## 2019-02-07 NOTE — PATIENT INSTRUCTIONS
Neck Spasm: Exercises  Your Care Instructions  Here are some examples of typical rehabilitation exercises for your condition. Start each exercise slowly. Ease off the exercise if you start to have pain. Your doctor or physical therapist will tell you when you can start these exercises and which ones will work best for you. How to do the exercises  Levator scapula stretch    1. Sit in a firm chair, or stand up straight. 2. Gently tilt your head toward your left shoulder. 3. Turn your head to look down into your armpit, bending your head slightly forward. Let the weight of your head stretch your neck muscles. 4. Hold for 15 to 30 seconds. 5. Return to your starting position. 6. Follow the same instructions above, but tilt your head toward your right shoulder. 7. Repeat 2 to 4 times toward each shoulder. Upper trapezius stretch    1. Sit in a firm chair, or stand up straight. 2. This stretch works best if you keep your shoulder down as you lean away from it. To help you remember to do this, start by relaxing your shoulders and lightly holding on to your thighs or your chair. 3. Tilt your head toward your shoulder and hold for 15 to 30 seconds. Let the weight of your head stretch your muscles. 4. If you would like a little added stretch, place your arm behind your back. Use the arm opposite of the direction you are tilting your head. For example, if you are tilting your head to the left, place your right arm behind your back. 5. Repeat 2 to 4 times toward each shoulder. Neck rotation    1. Sit in a firm chair, or stand up straight. 2. Keeping your chin level, turn your head to the right, and hold for 15 to 30 seconds. 3. Turn your head to the left, and hold for 15 to 30 seconds. 4. Repeat 2 to 4 times to each side. Chin tuck    1. Lie on the floor with a rolled-up towel under your neck. Your head should be touching the floor. 2. Slowly bring your chin toward the front of your neck.   3. Hold for a count of 6, and then relax for up to 10 seconds. 4. Repeat 8 to 12 times. Forward neck flexion    1. Sit in a firm chair, or stand up straight. 2. Bend your head forward. 3. Hold for 15 to 30 seconds, then return to your starting position. 4. Repeat 2 to 4 times. Follow-up care is a key part of your treatment and safety. Be sure to make and go to all appointments, and call your doctor if you are having problems. It's also a good idea to know your test results and keep a list of the medicines you take. Where can you learn more? Go to http://martina-dale.info/. Enter P962 in the search box to learn more about \"Neck Spasm: Exercises. \"  Current as of: September 20, 2018  Content Version: 11.9  © 9732-2118 [employer]. Care instructions adapted under license by [employer] (which disclaims liability or warranty for this information). If you have questions about a medical condition or this instruction, always ask your healthcare professional. [place] [address] any warranty or liability for your use of this information. Neck Strain or Sprain: Rehab Exercises  Your Care Instructions  Here are some examples of typical rehabilitation exercises for your condition. Start each exercise slowly. Ease off the exercise if you start to have pain. Your doctor or physical therapist will tell you when you can start these exercises and which ones will work best for you. How to do the exercises  Neck rotation    1. Sit in a firm chair, or stand up straight. 2. Keeping your chin level, turn your head to the right, and hold for 15 to 30 seconds. 3. Turn your head to the left and hold for 15 to 30 seconds. 4. Repeat 2 to 4 times to each side. Neck stretches    1. Look straight ahead, and tip your right ear to your right shoulder. Do not let your left shoulder rise up as you tip your head to the right. 2. Hold for 15 to 30 seconds.   3. Tilt your head to the left. Do not let your right shoulder rise up as you tip your head to the left. 4. Hold for 15 to 30 seconds. 5. Repeat 2 to 4 times to each side. Forward neck flexion    1. Sit in a firm chair, or stand up straight. 2. Bend your head forward. 3. Hold for 15 to 30 seconds. 4. Repeat 2 to 4 times. Lateral (side) bend strengthening    1. With your right hand, place your first two fingers on your right temple. 2. Start to bend your head to the side while using gentle pressure from your fingers to keep your head from bending. 3. Hold for about 6 seconds. 4. Repeat 8 to 12 times. 5. Switch hands and repeat the same exercise on your left side. Forward bend strengthening    1. Place your first two fingers of either hand on your forehead. 2. Start to bend your head forward while using gentle pressure from your fingers to keep your head from bending. 3. Hold for about 6 seconds. 4. Repeat 8 to 12 times. Neutral position strengthening    1. Using one hand, place your fingertips on the back of your head at the top of your neck. 2. Start to bend your head backward while using gentle pressure from your fingers to keep your head from bending. 3. Hold for about 6 seconds. 4. Repeat 8 to 12 times. Chin tuck    1. Lie on the floor with a rolled-up towel under your neck. Your head should be touching the floor. 2. Slowly bring your chin toward your chest.  3. Hold for a count of 6, and then relax for up to 10 seconds. 4. Repeat 8 to 12 times. Follow-up care is a key part of your treatment and safety. Be sure to make and go to all appointments, and call your doctor if you are having problems. It's also a good idea to know your test results and keep a list of the medicines you take. Where can you learn more? Go to http://martina-dale.info/. Enter M679 in the search box to learn more about \"Neck Strain or Sprain: Rehab Exercises. \"  Current as of: September 20, 2018  Content Version: 11.9  © 3509-2655 [employer]. Care instructions adapted under license by [employer] (which disclaims liability or warranty for this information). If you have questions about a medical condition or this instruction, always ask your healthcare professional. [address] any warranty or liability for your use of this information. Neck: Exercises  Your Care Instructions  Here are some examples of typical rehabilitation exercises for your condition. Start each exercise slowly. Ease off the exercise if you start to have pain. Your doctor or physical therapist will tell you when you can start these exercises and which ones will work best for you. How to do the exercises  Neck stretch    1. This stretch works best if you keep your shoulder down as you lean away from it. To help you remember to do this, start by relaxing your shoulders and lightly holding on to your thighs or your chair. 2. Tilt your head toward your shoulder and hold for 15 to 30 seconds. Let the weight of your head stretch your muscles. 3. If you would like a little added stretch, use your hand to gently and steadily pull your head toward your shoulder. For example, keeping your right shoulder down, lean your head to the left. 4. Repeat 2 to 4 times toward each shoulder. Diagonal neck stretch    1. Turn your head slightly toward the direction you will be stretching, and tilt your head diagonally toward your chest and hold for 15 to 30 seconds. 2. If you would like a little added stretch, use your hand to gently and steadily pull your head forward on the diagonal.  3. Repeat 2 to 4 times toward each side. Dorsal glide stretch    1. Sit or stand tall and look straight ahead. 2. Slowly tuck your chin as you glide your head backward over your body  3. Hold for a count of 6, and then relax for up to 10 seconds. 4. Repeat 8 to 12 times.     Chest and shoulder stretch    1. Sit or stand tall and glide your head backward as in the dorsal glide stretch. 2. Raise both arms so that your hands are next to your ears. 3. Take a deep breath, and as you breathe out, lower your elbows down and behind your back. You will feel your shoulder blades slide down and together, and at the same time you will feel a stretch across your chest and the front of your shoulders. 4. Hold for about 6 seconds, and then relax for up to 10 seconds. 5. Repeat 8 to 12 times. Strengthening: Hands on head    1. Move your head backward, forward, and side to side against gentle pressure from your hands, holding each position for about 6 seconds. 2. Repeat 8 to 12 times. Follow-up care is a key part of your treatment and safety. Be sure to make and go to all appointments, and call your doctor if you are having problems. It's also a good idea to know your test results and keep a list of the medicines you take. Where can you learn more? Go to http://martina-dale.info/. Enter P975 in the search box to learn more about \"Neck: Exercises. \"  Current as of: September 20, 2018  Content Version: 11.9  © 3741-9530 [employer]. Care instructions adapted under license by [employer] (which disclaims liability or warranty for this information). If you have questions about a medical condition or this instruction, always ask your healthcare professional. [address] any warranty or liability for your use of this information.

## 2019-02-07 NOTE — PROGRESS NOTES
[address]  [address]  [place]  Phone: [phone]  Fax: [phone]        Adia April  : 1955  PCP: Edith Yu MD  2019    PROGRESS NOTE      ASSESSMENT AND PLAN    Tammy Roberson comes in to the office today for medication f/u. He is currently taking Gabapentin 300mg BID, and he is tolerating it well. He is interested in increasing it to TID. He found 100% relief from the injection (L2-3 interlaminar) for 48 hours, however, he continues to feel about 95% relief. The residual pain is managed well with Gabapentin. He also c/o a newer neck pain and decreased ROM to the L. He rates his pain as a 0/10 today. On examination, he had tenderness to palpation of his scalenes and trapezius on the L. He had decreased ROM of his neck and shoulder on the L. He has a positive Bullard sign on the L and pain reproduced with activation of the supraspinatus. His neck pain is likely myofascial in nature compensatory for his L shoulder rotator cuff impingement. I provided neck and rotator cuff exercises to add to his HEP. I offered a referral to PT, but he declines. I prescribed 10mg Prednisone dose pack. His residual pain likely remains due to lumbar spinal stenosis vs lumbar radiculopathy. I increased his Gabapentin to 300mg TID. I advised he can call if he needs to increase it to 600mg TID. Pt will f/u in 3 months or sooner as needed. Diagnoses and all orders for this visit:    1. Cervical pain  -     AMB POC XRAY, SPINE, CERVICAL; 2 OR 3  -     predniSONE (STERAPRED DS) 10 mg dose pack; See administration instruction per 10mg dose pack    2. Cervical myofascial pain syndrome  -     predniSONE (STERAPRED DS) 10 mg dose pack; See administration instruction per 10mg dose pack    3. Lumbar radiculopathy  -     gabapentin (NEURONTIN) 300 mg capsule; Take 1 Cap by mouth three (3) times daily.   -     predniSONE (STERAPRED DS) 10 mg dose pack; See administration instruction per 10mg dose pack    4. Spinal stenosis of lumbar region with neurogenic claudication  -     gabapentin (NEURONTIN) 300 mg capsule; Take 1 Cap by mouth three (3) times daily. -     predniSONE (STERAPRED DS) 10 mg dose pack; See administration instruction per 10mg dose pack    5. Facet arthropathy  -     predniSONE (STERAPRED DS) 10 mg dose pack; See administration instruction per 10mg dose pack    6. Spondylosis of lumbar region without myelopathy or radiculopathy  -     predniSONE (STERAPRED DS) 10 mg dose pack; See administration instruction per 10mg dose pack      Follow-up Disposition:  Return in about 3 months (around 5/7/2019). HISTORY OF PRESENT ILLNESS  Demetris Watters is a 61 y.o. male. A&P / HPI from 3/6/18:  Katie Nest in to the office today c/o right thigh pain that has progressively worsened x 8 months. He rates his pain as a 6/10 today.       His MRI reveals a fairly severe spinal stenosis and bilateral foraminal stenosis at L4-L5, although he does not display correlating symptoms at this time. I advised on symptoms to pay attention to.        His radicular symptoms appear to be along the L2 dermatome. On examination, he has decreased sensation in the anterior thigh region along the L1/L2 distribution. His MRI reveals a minor diffuse disc bulge that causes mild bilateral foraminal stenosis at L1-L2. I suspect the pain is from lumbar radiculopathy vs meralgia paresthetica.      We discussed a lumbar epidural vs EMG vs nerve medication and discussed all risks and answered all questions. He defers the injection and EMG at this time.  I prescribed Amitriptyline 75mg QHS as he did not tolerate Gabapentin well in the past.        I advised he f/u with his PCP for a large renal cyst visualized on his MRI.       Pt will f/u in 1 month or sooner if needed.     HISTORY OF PRESENT ILLNESS  Jeet Griffiths is a 58 y.o. male c/o right thigh pain that sometimes causes numbness, tingling, or throbbing pain. The pain has progressively worsened over the last 8 months. He displays weakness walking up steps. He states the pain improves if he straightens his leg completely.       He was previously on Gabapentin and felt like the \"room was spinning. \" He decided to not take Lyrica for this reason, especially since he is caring for and lifting his father-in-law who recently had a stroke.       He is recently retired. He worked on [employer] in the past.      Pt denies any fevers, chills, nausea, vomiting. Pt denies any chest pain and shortness of breath. Pt denies any ear, nose, and throat problems. Pt denies any fecal or urinary incontinence.         Updates from 04/10/18:  Pt presents for 1 month f/u.     He notes he has had increased pain since his last visit. He has had more pain radiating from his low back radiating into the posterior aspect of his BLE. He also continues to have pain radiating into his bilateral thighs, but the pain in the posterior aspect of his BLE is causing more of his pain today. He rates his pain as a 9/10 today. He has seen some relief using Aleve.     He has not seen any relief with the Amitriptyline or the Salonpas.     He is accompanied by his wife.     Updates from 05/08/18:  Pt presents for L2-3 interlaminar injection f/u.     He notes he has had significant relief from the injection and continues to feel relief today.     He notes once he tried the Gabapentin for longer than 2 days, he was able to tolerate the medication well.     He is accompanied by his wife. Updates from 02/07/19:  Pt presents for medication f/u. He is currently taking Gabapentin 300mg BID, and he is tolerating it well. He is interested in increasing it to TID. He found 100% relief from the injection (L2-3 interlaminar) for 48 hours, however, he continues to feel about 95% relief.  The residual pain is managed well with Gabapentin. He also c/o a newer neck pain and decreased ROM to the L. He previously had an arthroscopic surgery on his L shoulder, but he continues to have some residual pain. 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 Nik Chahal liver 11/00    Fib-4 was 0.54 from 10/14    GERD (gastroesophageal reflux disease)     and HH on UGI 11/00    Hip bursitis     Dr Adela Wheeler left 2015    Prediabetes 2011     Rhinophyma     S/P cardiac cath 2000    nl, EF 60% Dr. Terrell Dumont Umbilical hernia        Past Surgical History:   Procedure Laterality Date    HX COLONOSCOPY      Dr. Fiilpe Carlos 10/10 diverticulosis    HX ORTHOPAEDIC  2012    Dr. Herbert Foy left distal clavicle excision and acromioplasty   . MEDICATIONS    Current Outpatient Medications   Medication Sig Dispense Refill    azithromycin (ZITHROMAX) 250 mg tablet Take 250 mg by mouth daily.  gabapentin (NEURONTIN) 300 mg capsule TAKE ONE CAPSULE BY MOUTH TWICE A DAY 60 Cap 1    celecoxib (CELEBREX) 200 mg capsule TAKE ONE CAPSULE BY MOUTH DAILY 90 Cap 1    omeprazole (PRILOSEC) 40 mg capsule TAKE ONE CAPSULE BY MOUTH DAILY 90 Cap 3    acetaminophen (TYLENOL EXTRA STRENGTH) 500 mg tablet Take 1,000 mg by mouth every six (6) hours as needed for Pain.  traMADol (ULTRAM) 50 mg tablet Take 1 Tab by mouth every eight (8) hours as needed for Pain. Max Daily Amount: 150 mg. (Patient taking differently: Take 50 mg by mouth every eight (8) hours as needed for Pain.) 21 Tab 0    cholecalciferol (VITAMIN D3) 1,000 unit tablet Take 2,500 Units by mouth daily.  CALCIUM CARBONATE (CALCIUM 600 PO) Take  by mouth.       Omega-3 Fatty Acids (FISH OIL) 500 mg cap Take 12,200 mg by mouth.  magnesium 200 mg tab Take 400 mg by mouth.  chromium picolinate 1,000 mcg tablet Take 1 Tab by mouth daily.  OTHER       ascorbic acid (VITAMIN C) 500 mg tablet Take  by mouth.  diclofenac (VOLTAREN) 1 % topical gel Apply 4 grams to affected joint up to 4 times per day, maximum 16 grams per joint per day  Dispense 5 100 gram tubes (Patient taking differently: Apply 4 grams to affected joint up to 4 times per day, maximum 16 grams per joint per day  Dispense 5 100 gram tubes) 100 g 0    multivitamin (ONE A DAY) tablet Take 1 Tab by mouth daily.  cyanocobalamin 1,000 mcg tablet Take 1,000 mcg by mouth daily. ALLERGIES  Allergies   Allergen Reactions    Cymbalta [Duloxetine] Other (comments)     [place] funny          SOCIAL HISTORY    Social History     Socioeconomic History    Marital status:      Spouse name: Not on file    Number of children: 3    Years of education: Not on file    Highest education level: Not on file   Occupational History    Occupation: mgr at [employer]   Tobacco Use    Smoking status: Former Smoker     Last attempt to quit: 2005     Years since quittin.1    Smokeless tobacco: Never Used    Tobacco comment: 60+ py   Substance and Sexual Activity    Alcohol use: No    Drug use: No       FAMILY HISTORY  Family History   Problem Relation Age of Onset    Other Father         cardiomyopathy    Heart Disease Brother          REVIEW OF SYSTEMS  Review of Systems   Musculoskeletal: Positive for back pain and neck pain.         BLE pain          PHYSICAL EXAMINATION  Visit Vitals  /88 (BP 1 Location: Left arm, BP Patient Position: Sitting)   Pulse 65   Ht 5' 10\" (1.778 m)   Wt 200 lb (90.7 kg)   BMI 28.70 kg/m²       Pain Assessment  2019   Location of Pain Back;Neck   Location Modifiers -   Severity of Pain 0   Quality of Pain Aching   Quality of Pain Comment -   Duration of Pain - Frequency of Pain Intermittent   Aggravating Factors -   Aggravating Factors Comment -   Limiting Behavior Some   Relieving Factors Rest   Relieving Factors Comment -   Result of Injury No           Constitutional:  Well developed, well nourished, in no acute distress. Psychiatric: Affect and mood are appropriate. Integumentary: No rashes or abrasions noted on exposed areas. SPINE/MUSCULOSKELETAL EXAM    Cervical spine:  Neck is midline. Normal muscle tone. No focal atrophy is noted. ROM pain free. Shoulder ROM intact. No tenderness to palpation. Negative Spurling's sign. Negative Tinel's sign. Negative Tovar's sign.       Sensation in the bilateral arms grossly intact to light touch.       Lumbar spine:  No rash, ecchymosis, or gross obliquity. No fasciculations. No focal atrophy is noted. No pain with hip ROM. Full range of motion. No tenderness to palpation. No tenderness to palpation at the sciatic notch. SI joints non-tender. Trochanters non tender.      Decreased sensation L1/L2 on the right side. Updates from 2/7/19:  Tenderness to palpation of scalenes and trapezius on the L  Decreased neck ROM to the L  Decreased ROM of L shoulder    MOTOR:      Biceps  Triceps Deltoids Wrist Ext Wrist Flex Hand Intrin   Right 5/5 5/5 5/5 5/5 5/5 5/5   Left 5/5 5/5 5/5 5/5 5/5 5/5             Hip Flex  Quads Hamstrings Ankle DF EHL Ankle PF   Right 5/5 5/5 5/5 5/5 5/5 5/5   Left 5/5 5/5 5/5 5/5 5/5 5/5     DTRs are 2+ biceps, triceps, brachioradialis, patella, and Achilles.      Negative Straight Leg raise. Squat not tested. No difficulty with tandem gait.       Ambulation without assistive device. FWB.       RADIOGRAPHS  Lumbar MRI images taken on 2/20/18 personally reviewed with patient:  FINDINGS:       Sagittal images show slightly straightened lordosis. There is no vertebral body  compression deformity or significant listhesis.  There is a small amount of edema  within the posterior inferior margin of L4 and the superior posterior right  lateral margin of L5 adjacent. There is a low T1 and T2 signal 1 cm defect  within the superior midline L5 endplate which may be a Schmorl's node. Mild to  moderate disc space narrowing at this level. .       There is no other vertebral body edema. Disc desiccation with slight narrowing  L1-2. Cauda equina tapers at L1-2 level.      Partially imaged kidneys show global relative mild to moderate atrophy of the  right kidney compared to the left, partially included. There are anterior and  posterior left parapelvic renal cysts, with a 6 cm lobulated posterior cortical  partially exophytic cyst. Abdominal CT from 2/2006 is not available in PACS for  comparison. Dmitriy Pack  Correlation with axial images shows:      L1-2:  Mild diffuse disc bulge. No significant canal narrowing. There is mild  bilateral foraminal encroachment.      L2-3:  Posterior ligamentous thickening up to 5 mm. Minimal diffuse disc bulge. Central canal slightly narrowed to 10.8 mm. No significant foraminal stenosis.      L3-4:  Mild facet degenerative change with posterior ligamentous thickening up  to 5.5 mm. There is mild diffuse disc bulge. Central canal is mildly to  moderately narrowed down to 8.5 mm. There is a component of posterior epidural  lipomatosis. Mild lateral recess narrowing and mild bilateral foraminal  encroachment.      L4-5:  Fairly severe bilateral degenerative facet hypertrophy. There is  posterior ligamentous thickening up to 7 mm. There is a diffuse disc bulge at  this level which contacts and flattens the thecal sac from anterior. Central  canal is significantly narrowed down to 6 mm AP diameter. Moderate to severe  left-sided foraminal stenosis due to facet hypertrophy predominantly but also  disc bulge laterally which appears to contact the caudal surface of the exiting  nerve root.  Similar findings on the right.      L5-S1:  Moderate bilateral degenerative facet hypertrophy with mild ligamentous  thickening. There is a broad-based posterior disc bulge or protrusion which  extends 4 mm into the canal. Central canal is mildly narrowed down to 10 mm  subsequent. There is moderate to severe left and right foraminal stenosis due to  facet encroachment and endplate ridging and disc bulge.          IMPRESSION  IMPRESSION:      1.  Fairly severe central canal narrowing and bilateral foraminal stenosis L4-5 on a multifactorial degenerative basis as above. -There is some mild edema within the posterior adjoining endplates at this  level.      2. Broad-based posterior disc bulge or protrusion, with mild canal but fairly  severe bilateral neural foraminal narrowing L5-S1 as above. 25 minutes of face-to-face contact were spent with the patient during today's visit extensively discussing symptoms and treatment plan. All questions were answered. More than half of this visit today was spent on counseling.      Written by Henrik Huang as dictated by Yamileth Clarke MD

## 2019-02-12 ENCOUNTER — HOSPITAL ENCOUNTER (OUTPATIENT)
Dept: GENERAL RADIOLOGY | Age: 64
Discharge: HOME | End: 2019-02-12
Payer: COMMERCIAL

## 2019-02-12 ENCOUNTER — OFFICE VISIT (OUTPATIENT)
Dept: INTERNAL MEDICINE CLINIC | Age: 64
End: 2019-02-12

## 2019-02-12 VITALS
SYSTOLIC BLOOD PRESSURE: 162 MMHG | HEART RATE: 57 BPM | OXYGEN SATURATION: 96 % | DIASTOLIC BLOOD PRESSURE: 98 MMHG | WEIGHT: 205 LBS | BODY MASS INDEX: 29.35 KG/M2 | HEIGHT: 70 IN | TEMPERATURE: 97.7 F | RESPIRATION RATE: 14 BRPM

## 2019-02-12 DIAGNOSIS — N28.1 RENAL CYSTS, ACQUIRED, BILATERAL: ICD-10-CM

## 2019-02-12 DIAGNOSIS — R05.3 PERSISTENT COUGH: ICD-10-CM

## 2019-02-12 DIAGNOSIS — J18.9 COMMUNITY ACQUIRED PNEUMONIA OF RIGHT LOWER LOBE OF LUNG: ICD-10-CM

## 2019-02-12 DIAGNOSIS — I10 PRIMARY HYPERTENSION: ICD-10-CM

## 2019-02-12 DIAGNOSIS — R10.9 ABDOMINAL PAIN, UNSPECIFIED ABDOMINAL LOCATION: ICD-10-CM

## 2019-02-12 DIAGNOSIS — E78.5 HYPERLIPIDEMIA, UNSPECIFIED HYPERLIPIDEMIA TYPE: ICD-10-CM

## 2019-02-12 DIAGNOSIS — J18.9 COMMUNITY ACQUIRED PNEUMONIA OF RIGHT LOWER LOBE OF LUNG: Primary | ICD-10-CM

## 2019-02-12 PROBLEM — M47.816 DEGENERATIVE ARTHRITIS OF LUMBAR SPINE: Status: ACTIVE | Noted: 2019-02-12

## 2019-02-12 PROCEDURE — 71046 X-RAY EXAM CHEST 2 VIEWS: CPT

## 2019-02-12 RX ORDER — AMLODIPINE BESYLATE 5 MG/1
5 TABLET ORAL DAILY
Qty: 30 TAB | Refills: 5 | Status: SHIPPED | OUTPATIENT
Start: 2019-02-12 | End: 2019-07-22 | Stop reason: SDUPTHER

## 2019-02-12 NOTE — PROGRESS NOTES
1. Have you been to the ER, urgent care clinic or hospitalized since your last visit? NO.  
 
2. Have you seen or consulted any other health care providers outside of the [address] [place] [address] since your last visit (Include any pap smears or colon screening)? NO Chief Complaint Patient presents with  Physical  
  labs

## 2019-02-13 ENCOUNTER — TELEPHONE (OUTPATIENT)
Dept: INTERNAL MEDICINE CLINIC | Age: 64
End: 2019-02-13

## 2019-02-13 DIAGNOSIS — N28.89 BILATERAL RENAL MASSES: ICD-10-CM

## 2019-02-13 DIAGNOSIS — R10.9 RIGHT LATERAL ABDOMINAL PAIN: Primary | ICD-10-CM

## 2019-02-13 NOTE — TELEPHONE ENCOUNTER
Spoke with patient, notified of chest x-ray results, given the # to Kerry Martini to get his CT scheduled now. I told him to please let us know if he has any trouble getting his CT scheduled and we can call over. I know that Kerry Martini usually can get people scheduled quickly though. I also called Kerry Martini, , and left a detailed message that we would like to have this patient's CT scan expedited please, scheduled as soon as we can. Instructed her to call the office should she have any questions.

## 2019-02-18 ENCOUNTER — HOSPITAL ENCOUNTER (OUTPATIENT)
Dept: CT IMAGING | Age: 64
Discharge: HOME | End: 2019-02-18
Attending: INTERNAL MEDICINE
Payer: COMMERCIAL

## 2019-02-18 DIAGNOSIS — N28.89 BILATERAL RENAL MASSES: ICD-10-CM

## 2019-02-18 DIAGNOSIS — R10.9 RIGHT LATERAL ABDOMINAL PAIN: ICD-10-CM

## 2019-02-18 LAB — CREAT UR-MCNC: 1.1 MG/DL (ref 0.6–1.3)

## 2019-02-18 PROCEDURE — 82565 ASSAY OF CREATININE: CPT

## 2019-02-18 PROCEDURE — 74178 CT ABD&PLV WO CNTR FLWD CNTR: CPT

## 2019-02-18 PROCEDURE — 74011636320 HC RX REV CODE- 636/320: Performed by: INTERNAL MEDICINE

## 2019-02-18 RX ADMIN — IOPAMIDOL 100 ML: 612 INJECTION, SOLUTION INTRAVENOUS at 20:04

## 2019-02-19 ENCOUNTER — TELEPHONE (OUTPATIENT)
Dept: INTERNAL MEDICINE CLINIC | Age: 64
End: 2019-02-19

## 2019-02-19 NOTE — TELEPHONE ENCOUNTER
pls call    IMPRESSION:    There are 3 left renal cysts with benign features. No definite finding for  solid renal mass. Small calcification in the right kidney could represent a nonobstructing stone  versus vascular calcification. Hepatic steatosis. Atherosclerotic disease. Diverticulosis. .    No appendicitis    Dr Janessa Garcia can also review the test

## 2019-02-20 ENCOUNTER — HOSPITAL ENCOUNTER (OUTPATIENT)
Dept: LAB | Age: 64
Discharge: HOME | End: 2019-02-20
Payer: COMMERCIAL

## 2019-02-20 PROCEDURE — 88302 TISSUE EXAM BY PATHOLOGIST: CPT

## 2019-02-25 ENCOUNTER — APPOINTMENT (OUTPATIENT)
Dept: CT IMAGING | Age: 64
End: 2019-02-25
Attending: EMERGENCY MEDICINE
Payer: COMMERCIAL

## 2019-02-25 ENCOUNTER — APPOINTMENT (OUTPATIENT)
Dept: GENERAL RADIOLOGY | Age: 64
End: 2019-02-25
Attending: EMERGENCY MEDICINE
Payer: COMMERCIAL

## 2019-02-25 ENCOUNTER — TELEPHONE (OUTPATIENT)
Dept: INTERNAL MEDICINE CLINIC | Age: 64
End: 2019-02-25

## 2019-02-25 ENCOUNTER — HOSPITAL ENCOUNTER (OUTPATIENT)
Age: 64
Setting detail: OBSERVATION
Discharge: HOME | End: 2019-02-26
Attending: EMERGENCY MEDICINE | Admitting: HOSPITALIST
Payer: COMMERCIAL

## 2019-02-25 DIAGNOSIS — I10 ELEVATED BLOOD PRESSURE READING WITH DIAGNOSIS OF HYPERTENSION: ICD-10-CM

## 2019-02-25 DIAGNOSIS — R06.09 DYSPNEA ON EXERTION: ICD-10-CM

## 2019-02-25 DIAGNOSIS — R07.9 INTERMITTENT CHEST PAIN: Primary | ICD-10-CM

## 2019-02-25 DIAGNOSIS — J20.9 ACUTE BRONCHITIS, UNSPECIFIED ORGANISM: ICD-10-CM

## 2019-02-25 PROBLEM — R07.89 ATYPICAL CHEST PAIN: Status: ACTIVE | Noted: 2019-02-25

## 2019-02-25 LAB
ANION GAP SERPL CALC-SCNC: 7 MMOL/L (ref 3–18)
ATRIAL RATE: 68 BPM
BASOPHILS # BLD: 0 K/UL (ref 0–0.1)
BASOPHILS NFR BLD: 0 % (ref 0–2)
BUN SERPL-MCNC: 22 MG/DL (ref 7–18)
BUN/CREAT SERPL: 22 (ref 12–20)
CALCIUM SERPL-MCNC: 9 MG/DL (ref 8.5–10.1)
CALCULATED R AXIS, ECG10: -2 DEGREES
CALCULATED T AXIS, ECG11: 52 DEGREES
CHLORIDE SERPL-SCNC: 106 MMOL/L (ref 100–108)
CK MB CFR SERPL CALC: 2.6 % (ref 0–4)
CK MB SERPL-MCNC: 3.7 NG/ML (ref 5–25)
CK SERPL-CCNC: 140 U/L (ref 39–308)
CO2 SERPL-SCNC: 27 MMOL/L (ref 21–32)
CREAT SERPL-MCNC: 1 MG/DL (ref 0.6–1.3)
DIAGNOSIS, 93000: NORMAL
DIFFERENTIAL METHOD BLD: ABNORMAL
EOSINOPHIL # BLD: 0.2 K/UL (ref 0–0.4)
EOSINOPHIL NFR BLD: 2 % (ref 0–5)
ERYTHROCYTE [DISTWIDTH] IN BLOOD BY AUTOMATED COUNT: 12.9 % (ref 11.6–14.5)
GLUCOSE SERPL-MCNC: 99 MG/DL (ref 74–99)
HCT VFR BLD AUTO: 49.5 % (ref 36–48)
HGB BLD-MCNC: 17.1 G/DL (ref 13–16)
LYMPHOCYTES # BLD: 1.9 K/UL (ref 0.9–3.6)
LYMPHOCYTES NFR BLD: 23 % (ref 21–52)
MCH RBC QN AUTO: 32.5 PG (ref 24–34)
MCHC RBC AUTO-ENTMCNC: 34.5 G/DL (ref 31–37)
MCV RBC AUTO: 94.1 FL (ref 74–97)
MONOCYTES # BLD: 0.8 K/UL (ref 0.05–1.2)
MONOCYTES NFR BLD: 9 % (ref 3–10)
NEUTS SEG # BLD: 5.7 K/UL (ref 1.8–8)
NEUTS SEG NFR BLD: 66 % (ref 40–73)
P-R INTERVAL, ECG05: 152 MS
PLATELET # BLD AUTO: 277 K/UL (ref 135–420)
PMV BLD AUTO: 9.3 FL (ref 9.2–11.8)
POTASSIUM SERPL-SCNC: 4.6 MMOL/L (ref 3.5–5.5)
Q-T INTERVAL, ECG07: 382 MS
QRS DURATION, ECG06: 84 MS
QTC CALCULATION (BEZET), ECG08: 406 MS
RBC # BLD AUTO: 5.26 M/UL (ref 4.7–5.5)
SODIUM SERPL-SCNC: 140 MMOL/L (ref 136–145)
TROPONIN I SERPL-MCNC: <0.02 NG/ML (ref 0–0.04)
VENTRICULAR RATE, ECG03: 68 BPM
WBC # BLD AUTO: 8.6 K/UL (ref 4.6–13.2)

## 2019-02-25 PROCEDURE — 74011636320 HC RX REV CODE- 636/320: Performed by: EMERGENCY MEDICINE

## 2019-02-25 PROCEDURE — 77030029684 HC NEB SM VOL KT MONA -A

## 2019-02-25 PROCEDURE — 80048 BASIC METABOLIC PNL TOTAL CA: CPT

## 2019-02-25 PROCEDURE — 99218 HC RM OBSERVATION: CPT

## 2019-02-25 PROCEDURE — 93005 ELECTROCARDIOGRAM TRACING: CPT

## 2019-02-25 PROCEDURE — 87040 BLOOD CULTURE FOR BACTERIA: CPT

## 2019-02-25 PROCEDURE — 74011250637 HC RX REV CODE- 250/637: Performed by: EMERGENCY MEDICINE

## 2019-02-25 PROCEDURE — 85025 COMPLETE CBC W/AUTO DIFF WBC: CPT

## 2019-02-25 PROCEDURE — 71275 CT ANGIOGRAPHY CHEST: CPT

## 2019-02-25 PROCEDURE — 74011250637 HC RX REV CODE- 250/637: Performed by: HOSPITALIST

## 2019-02-25 PROCEDURE — 36415 COLL VENOUS BLD VENIPUNCTURE: CPT

## 2019-02-25 PROCEDURE — 82550 ASSAY OF CK (CPK): CPT

## 2019-02-25 PROCEDURE — 74011250636 HC RX REV CODE- 250/636: Performed by: EMERGENCY MEDICINE

## 2019-02-25 PROCEDURE — 94640 AIRWAY INHALATION TREATMENT: CPT

## 2019-02-25 PROCEDURE — 96365 THER/PROPH/DIAG IV INF INIT: CPT

## 2019-02-25 PROCEDURE — 74011000250 HC RX REV CODE- 250: Performed by: EMERGENCY MEDICINE

## 2019-02-25 PROCEDURE — 71046 X-RAY EXAM CHEST 2 VIEWS: CPT

## 2019-02-25 PROCEDURE — 99284 EMERGENCY DEPT VISIT MOD MDM: CPT

## 2019-02-25 PROCEDURE — 94761 N-INVAS EAR/PLS OXIMETRY MLT: CPT

## 2019-02-25 RX ORDER — LEVOFLOXACIN 5 MG/ML
500 INJECTION, SOLUTION INTRAVENOUS ONCE
Status: COMPLETED | OUTPATIENT
Start: 2019-02-25 | End: 2019-02-25

## 2019-02-25 RX ORDER — THERA TABS 400 MCG
1 TAB ORAL DAILY
Status: DISCONTINUED | OUTPATIENT
Start: 2019-02-26 | End: 2019-02-26 | Stop reason: HOSPADM

## 2019-02-25 RX ORDER — AMLODIPINE BESYLATE 5 MG/1
5 TABLET ORAL DAILY
Status: DISCONTINUED | OUTPATIENT
Start: 2019-02-26 | End: 2019-02-25

## 2019-02-25 RX ORDER — ONDANSETRON 2 MG/ML
4 INJECTION INTRAMUSCULAR; INTRAVENOUS
Status: DISCONTINUED | OUTPATIENT
Start: 2019-02-25 | End: 2019-02-26 | Stop reason: HOSPADM

## 2019-02-25 RX ORDER — AMLODIPINE BESYLATE 5 MG/1
5 TABLET ORAL
Status: DISCONTINUED | OUTPATIENT
Start: 2019-02-25 | End: 2019-02-26 | Stop reason: HOSPADM

## 2019-02-25 RX ORDER — GABAPENTIN 300 MG/1
300 CAPSULE ORAL 3 TIMES DAILY
Status: DISCONTINUED | OUTPATIENT
Start: 2019-02-25 | End: 2019-02-26 | Stop reason: HOSPADM

## 2019-02-25 RX ORDER — ACETAMINOPHEN 325 MG/1
650 TABLET ORAL
Status: DISCONTINUED | OUTPATIENT
Start: 2019-02-25 | End: 2019-02-26 | Stop reason: HOSPADM

## 2019-02-25 RX ORDER — ASPIRIN 81 MG/1
81 TABLET ORAL
Status: DISCONTINUED | OUTPATIENT
Start: 2019-02-25 | End: 2019-02-25 | Stop reason: SDUPTHER

## 2019-02-25 RX ORDER — ASPIRIN 325 MG
325 TABLET ORAL
Status: COMPLETED | OUTPATIENT
Start: 2019-02-25 | End: 2019-02-25

## 2019-02-25 RX ORDER — SODIUM CHLORIDE 0.9 % (FLUSH) 0.9 %
5-40 SYRINGE (ML) INJECTION EVERY 8 HOURS
Status: DISCONTINUED | OUTPATIENT
Start: 2019-02-25 | End: 2019-02-26 | Stop reason: HOSPADM

## 2019-02-25 RX ORDER — SIMVASTATIN 20 MG/1
20 TABLET, FILM COATED ORAL
Status: DISCONTINUED | OUTPATIENT
Start: 2019-02-25 | End: 2019-02-26 | Stop reason: HOSPADM

## 2019-02-25 RX ORDER — SODIUM CHLORIDE 0.9 % (FLUSH) 0.9 %
5-40 SYRINGE (ML) INJECTION AS NEEDED
Status: DISCONTINUED | OUTPATIENT
Start: 2019-02-25 | End: 2019-02-26 | Stop reason: HOSPADM

## 2019-02-25 RX ORDER — HEPARIN SODIUM 5000 [USP'U]/ML
5000 INJECTION, SOLUTION INTRAVENOUS; SUBCUTANEOUS EVERY 8 HOURS
Status: DISCONTINUED | OUTPATIENT
Start: 2019-02-25 | End: 2019-02-25

## 2019-02-25 RX ORDER — IPRATROPIUM BROMIDE AND ALBUTEROL SULFATE 2.5; .5 MG/3ML; MG/3ML
3 SOLUTION RESPIRATORY (INHALATION)
Status: COMPLETED | OUTPATIENT
Start: 2019-02-25 | End: 2019-02-25

## 2019-02-25 RX ORDER — PANTOPRAZOLE SODIUM 40 MG/1
40 TABLET, DELAYED RELEASE ORAL
Status: DISCONTINUED | OUTPATIENT
Start: 2019-02-26 | End: 2019-02-26 | Stop reason: HOSPADM

## 2019-02-25 RX ADMIN — IPRATROPIUM BROMIDE AND ALBUTEROL SULFATE 3 ML: .5; 3 SOLUTION RESPIRATORY (INHALATION) at 13:37

## 2019-02-25 RX ADMIN — AMLODIPINE BESYLATE 5 MG: 5 TABLET ORAL at 21:59

## 2019-02-25 RX ADMIN — Medication 10 ML: at 22:04

## 2019-02-25 RX ADMIN — LEVOFLOXACIN 500 MG: 5 INJECTION, SOLUTION INTRAVENOUS at 15:00

## 2019-02-25 RX ADMIN — SIMVASTATIN 20 MG: 20 TABLET, FILM COATED ORAL at 21:59

## 2019-02-25 RX ADMIN — ACETAMINOPHEN 650 MG: 325 TABLET ORAL at 22:00

## 2019-02-25 RX ADMIN — IOPAMIDOL 100 ML: 755 INJECTION, SOLUTION INTRAVENOUS at 14:08

## 2019-02-25 RX ADMIN — GABAPENTIN 300 MG: 300 CAPSULE ORAL at 21:59

## 2019-02-25 RX ADMIN — ASPIRIN 325 MG ORAL TABLET 325 MG: 325 PILL ORAL at 16:35

## 2019-02-25 NOTE — ED NOTES
TRANSFER - OUT REPORT: 
 
Verbal report given to Margarito Deras on Yvone Drain  being transferred to [facility] Room #461 for routine progression of care Report consisted of patients Situation, Background, Assessment and  
Recommendations(SBAR). Information from the following report(s) ED Summary was reviewed with the receiving nurse. Lines:  
Peripheral IV 02/25/19 Right Antecubital (Active) Site Assessment Clean, dry, & intact 2/25/2019 12:17 PM  
Phlebitis Assessment 0 2/25/2019 12:17 PM  
Infiltration Assessment 0 2/25/2019 12:17 PM  
Dressing Status Clean, dry, & intact 2/25/2019 12:17 PM  
Dressing Type 4 X 4 2/25/2019 12:17 PM  
Hub Color/Line Status Pink 2/25/2019 12:17 PM  
Alcohol Cap Used No 2/25/2019 12:17 PM  
  
 
Opportunity for questions and clarification was provided. Patient transported with: 
 Monitor

## 2019-02-25 NOTE — ED PROVIDER NOTES
EMERGENCY DEPARTMENT HISTORY AND PHYSICAL EXAM 
 
1:11 PM 
 
 
Date: 2/25/2019 Patient Name: Radha Grijalva History of Presenting Illness Chief Complaint Patient presents with  Shortness of Breath  Post-Op Problem  Chest Pain History Provided By: Patient Additional History (Context): Radha Grijalva is a 61 y.o. male with PNA and recently ventral hernia repair who presents with SOB associated with pleuritic chest pain x3 days and 1 episode of hemoptysis yesterday. Of note, the pt states his chest pain and SOB is worsened by deep breaths and exertion. Of note, the pt had outpatient ventral hernia repair 5 days ago at [facility] outpatient. In addition the pt was treated for PNA with a Z-rjaan in January. Denies any recent long distance travel. The pt denies fever, chills, abdominal pain, numbness, tingling, focal weakness, and any other associated sxs. Of note, the pt PCP: Julio Rasmussen MD 
 
Chief Complaint: SOB Duration:  Days Timing:  Intermittent Location: chest 
Quality: Aching, Pressure and Tightness Severity: Moderate Modifying Factors: worse with deep breath and exertion Associated Symptoms: chest pain, coughing up blood Past History Past Medical History: 
Past Medical History:  
Diagnosis Date  Arthritis   
 left knee Dr Nydia Velasquez 2015  Bright's disease   
 as a child  Degenerative arthritis of lumbar spine 02/2018 Dr Rodriguez Soto; [facility] showed multilevel disease, bilat foraminal stenosis L4-5, severe bilat foraminal narrowing L5-S1; s/p epidural 4/18  Diverticulosis 10/10 Dr. Clarke Standing  Fatty liver 11/00 Fib-4 was 0.54 from 10/14; US 2/18 showed fatty liver and hepatomegaly  GERD (gastroesophageal reflux disease)   
 and HH on UGI 11/00  
 Hip bursitis Dr Nydia Velasquez left 2015  Hyperlipidemia   
 calculated 10 yr risk score 15.2% (2/19)  Prediabetes 2011  Renal cysts, acquired, bilateral 02/2018 Us showed 1.4cm cyst right, 7 cm and 2.5 cm cysts on left  Rhinophyma  S/P cardiac cath   
 nl, EF 60% Dr. Trinh Buck  Umbilical hernia Past Surgical History: 
Past Surgical History:  
Procedure Laterality Date  HX COLONOSCOPY Dr. Hayley Coronado 10/10 diverticulosis  HX HERNIA REPAIR    
 HX ORTHOPAEDIC  2012 Dr. Justen Gordon left distal clavicle excision and acromioplasty Family History: 
Family History Problem Relation Age of Onset  Other Father   
     cardiomyopathy  Heart Disease Brother Social History: 
Social History Tobacco Use  Smoking status: Former Smoker Last attempt to quit: 2005 Years since quittin.1  Smokeless tobacco: Never Used  Tobacco comment: 60+ py  
Substance Use Topics  Alcohol use: No  
 Drug use: No  
 
 
Allergies: Allergies Allergen Reactions  Cymbalta [Duloxetine] Other (comments) [place] funny Review of Systems Review of Systems Constitutional: Negative for chills and fever. HENT: Negative for congestion, rhinorrhea, sore throat and trouble swallowing. Eyes: Negative for visual disturbance. Respiratory: Positive for chest tightness and shortness of breath. Negative for cough and wheezing. Cardiovascular: Negative for chest pain and leg swelling. Gastrointestinal: Negative for abdominal pain, nausea and vomiting. Endocrine: Negative for polyuria. Genitourinary: Negative for difficulty urinating and dysuria. Musculoskeletal: Negative for arthralgias and neck stiffness. Skin: Negative for rash. Neurological: Positive for light-headedness. Negative for dizziness, weakness, numbness and headaches. Hematological: Does not bruise/bleed easily. Psychiatric/Behavioral: Negative for confusion and dysphoric mood. All other systems reviewed and are negative. Physical Exam  
 
Visit Vitals BP (!) 146/101 (BP 1 Location: Left arm, BP Patient Position: At rest) Pulse 77 Temp 97.9 °F (36.6 °C) Resp 22 Ht 5' 10\" (1.778 m) Wt 89.8 kg (198 lb) SpO2 95% BMI 28.41 kg/m² Physical Exam  
Constitutional: He is oriented to person, place, and time. He appears well-developed and well-nourished. No distress. HENT:  
Head: Normocephalic and atraumatic. Mouth/Throat: Oropharynx is clear and moist.  
Active cough Eyes: Conjunctivae are normal. Pupils are equal, round, and reactive to light. No scleral icterus. Neck: Normal range of motion. Neck supple. Cardiovascular: Normal rate and intact distal pulses. Pulmonary/Chest: Effort normal and breath sounds normal. No respiratory distress. He has no wheezes. 91-92% on RA Abdominal: Soft. Bowel sounds are normal. He exhibits no distension. There is no tenderness. Abdominal girdle in place with drain from recent hernia. Has clear reddish fluid in the drain. Musculoskeletal: Normal range of motion. He exhibits no edema. No calf tenderness or LE edema, chest wall tenderness on the right without crepitus Lymphadenopathy:  
  He has no cervical adenopathy. Neurological: He is alert and oriented to person, place, and time. No cranial nerve deficit. He exhibits normal muscle tone. Coordination normal.  
Skin: Skin is warm and dry. He is not diaphoretic. Psychiatric: He has a normal mood and affect. His behavior is normal.  
Nursing note and vitals reviewed. Diagnostic Study Results Labs - Recent Results (from the past 12 hour(s)) EKG, 12 LEAD, INITIAL Collection Time: 02/25/19 12:03 PM  
Result Value Ref Range Ventricular Rate 68 BPM  
 Atrial Rate 68 BPM  
 P-R Interval 152 ms QRS Duration 84 ms Q-T Interval 382 ms QTC Calculation (Bezet) 406 ms Calculated R Axis -2 degrees Calculated T Axis 52 degrees Diagnosis Normal sinus rhythm Normal ECG When compared with ECG of 21-MAY-2012 07:42, No significant change was found CARDIAC PANEL,(CK, CKMB & TROPONIN) Collection Time: 02/25/19 12:05 PM  
Result Value Ref Range  39 - 308 U/L  
 CK - MB 3.7 (H) <3.6 ng/ml CK-MB Index 2.6 0.0 - 4.0 % Troponin-I, QT <0.02 0.0 - 0.045 NG/ML  
CBC WITH AUTOMATED DIFF Collection Time: 02/25/19 12:05 PM  
Result Value Ref Range WBC 8.6 4.6 - 13.2 K/uL  
 RBC 5.26 4.70 - 5.50 M/uL  
 HGB 17.1 (H) 13.0 - 16.0 g/dL HCT 49.5 (H) 36.0 - 48.0 % MCV 94.1 74.0 - 97.0 FL  
 MCH 32.5 24.0 - 34.0 PG  
 MCHC 34.5 31.0 - 37.0 g/dL  
 RDW 12.9 11.6 - 14.5 % PLATELET 277 373 - 036 K/uL MPV 9.3 9.2 - 11.8 FL  
 NEUTROPHILS 66 40 - 73 % LYMPHOCYTES 23 21 - 52 % MONOCYTES 9 3 - 10 % EOSINOPHILS 2 0 - 5 % BASOPHILS 0 0 - 2 %  
 ABS. NEUTROPHILS 5.7 1.8 - 8.0 K/UL  
 ABS. LYMPHOCYTES 1.9 0.9 - 3.6 K/UL  
 ABS. MONOCYTES 0.8 0.05 - 1.2 K/UL  
 ABS. EOSINOPHILS 0.2 0.0 - 0.4 K/UL  
 ABS. BASOPHILS 0.0 0.0 - 0.1 K/UL  
 DF AUTOMATED METABOLIC PANEL, BASIC Collection Time: 02/25/19 12:05 PM  
Result Value Ref Range Sodium 140 136 - 145 mmol/L Potassium 4.6 3.5 - 5.5 mmol/L Chloride 106 100 - 108 mmol/L  
 CO2 27 21 - 32 mmol/L Anion gap 7 3.0 - 18 mmol/L Glucose 99 74 - 99 mg/dL BUN 22 (H) 7.0 - 18 MG/DL Creatinine 1.00 0.6 - 1.3 MG/DL  
 BUN/Creatinine ratio 22 (H) 12 - 20 GFR est AA >60 >60 ml/min/1.73m2 GFR est non-AA >60 >60 ml/min/1.73m2 Calcium 9.0 8.5 - 10.1 MG/DL Radiologic Studies -  
XR CHEST PA LAT Final Result Impression: 1. Bibasilar atelectasis which has increased in the lung bases. Superimposed  
pneumonia is difficult to entirely exclude particularly in the left lower lobe. Medical Decision Making I am the first provider for this patient. I reviewed the vital signs, available nursing notes, past medical history, past surgical history, family history and social history. Vital Signs-Reviewed the patient's vital signs. Pulse Oximetry Analysis -  95 on room air (Interpretation)WNL Cardiac Monitor: 
Rate: 77 Rhythm:  Normal Sinus Rhythm EKG: Interpreted by the EP. Time Interpreted: 9796 Rate: 68 Rhythm: Normal Sinus Rhythm Interpretation: Nml qrs and qtc. No st elevations, no T wave inversions Records Reviewed: Nursing Notes and Old Medical Records (Time of Review: 1:11 PM) Provider Notes (Medical Decision Making): MDM DDx: cardiac, URI, PNA, PE, PNX. Patient with recent surgical procedure, labs, EKG, CXR, CTA chest to evaluate for PE. Medications  
albuterol-ipratropium (DUO-NEB) 2.5 MG-0.5 MG/3 ML (3 mL Nebulization Given 2/25/19 7377) iopamidol (ISOVUE-370) 76 % injection 100 mL (100 mL IntraVENous Given 2/25/19 1408) levoFLOXacin (LEVAQUIN) 500 mg in D5W IVPB (0 mg IntraVENous IV Completed 2/25/19 1605) aspirin tablet 325 mg (325 mg Oral Given 2/25/19 1635) ED Course: Progress Notes, Reevaluation, and Consults: WBC wnl Cr wnl Initial trop (-) Pt had nebs, IV abx with concern for possible PNA on CxR. CTA results pending (rule out PE) 
 
2:57 PM 
Discussed CTA results with pt and wife. No PE. Concerned with h/o intermittent chest pain radiating to his back and SOB worsened on exertion. Plan contact hospitalist for admission at least for observation. 4:14 PM Consult:  Discussed care with Dr. Hernandez Pi, Specialty: Hospitalist  Standard discussion; including history of patients chief complaint, available diagnostic results, and treatment course. Accepts for observation telemetry For Hospitalized Patients: 
 
1. Hospitalization Decision Time: The decision to hospitalize the patient was made by Dr. Ravi Lyons at [phone] on 2/25/2019 2. Aspirin: Aspirin was given. Diagnosis Clinical Impression:  
1. Intermittent chest pain 2. Dyspnea on exertion 3. Elevated blood pressure reading with diagnosis of hypertension 4. Acute bronchitis, unspecified organism Disposition: Admission observation Attestations: 
Scribe Attestation Marry Gar DO acting as a scribe for and in the presence of Jassi Hurst DO February 25, 2019 at 1:11 PM 
    
Provider Attestation:     
I personally performed the services described in the documentation, reviewed the documentation, as recorded by the scribe in my presence, and it accurately and completely records my words and actions. February 25, 2019 at 1:11 PM - Sushila Alvarez DO   
_______________________________

## 2019-02-25 NOTE — TELEPHONE ENCOUNTER
Spoke with patients wife Corazon. She stated patient had hernia surgery on last wed and after surgery they had a hard time getting his 02 stats up. They were able to get oxygen up to 90% and patient was sent home. Per wife yesterday patient was coughing up blood clots. She also stated he was sweating perferously and having trouble breathing (having a hard time getting a deep breath). Advised wife she should take patient to ED, she then stated someone told her the doctor could order a test to to rule out a Pulmonary Embolism. I advised Corazon that it would take too long to recive results and we cannot treat him here and to take patient to ED.

## 2019-02-25 NOTE — ED TRIAGE NOTES
Patient states having hernia repair last Wednesday. States hx of pneumonia in January. C/o shortness of breath and intermittent chest pain since Friday. States difficulty taking a deep breath.

## 2019-02-26 ENCOUNTER — APPOINTMENT (OUTPATIENT)
Dept: NON INVASIVE DIAGNOSTICS | Age: 64
End: 2019-02-26
Attending: HOSPITALIST
Payer: COMMERCIAL

## 2019-02-26 VITALS
WEIGHT: 198 LBS | TEMPERATURE: 97.6 F | OXYGEN SATURATION: 95 % | HEART RATE: 71 BPM | HEIGHT: 70 IN | BODY MASS INDEX: 28.35 KG/M2 | SYSTOLIC BLOOD PRESSURE: 154 MMHG | DIASTOLIC BLOOD PRESSURE: 89 MMHG | RESPIRATION RATE: 18 BRPM

## 2019-02-26 LAB
ALBUMIN SERPL-MCNC: 3.2 G/DL (ref 3.4–5)
ALBUMIN/GLOB SERPL: 0.9 {RATIO} (ref 0.8–1.7)
ALP SERPL-CCNC: 88 U/L (ref 45–117)
ALT SERPL-CCNC: 53 U/L (ref 16–61)
ANION GAP SERPL CALC-SCNC: 9 MMOL/L (ref 3–18)
APTT PPP: 32.1 SEC (ref 23–36.4)
AST SERPL-CCNC: 23 U/L (ref 15–37)
BILIRUB SERPL-MCNC: 0.5 MG/DL (ref 0.2–1)
BUN SERPL-MCNC: 25 MG/DL (ref 7–18)
BUN/CREAT SERPL: 24 (ref 12–20)
CALCIUM SERPL-MCNC: 8.3 MG/DL (ref 8.5–10.1)
CHLORIDE SERPL-SCNC: 109 MMOL/L (ref 100–108)
CHOLEST SERPL-MCNC: 141 MG/DL
CO2 SERPL-SCNC: 23 MMOL/L (ref 21–32)
CREAT SERPL-MCNC: 1.04 MG/DL (ref 0.6–1.3)
ECHO AO ROOT DIAM: 3.49 CM
ECHO LA AREA 4C: 15.6 CM2
ECHO LA VOL 2C: 40.38 ML (ref 18–58)
ECHO LA VOL 4C: 41.36 ML (ref 18–58)
ECHO LA VOL BP: 45.97 ML (ref 18–58)
ECHO LA VOL/BSA BIPLANE: 22.12 ML/M2 (ref 16–28)
ECHO LA VOLUME INDEX A2C: 19.43 ML/M2 (ref 16–28)
ECHO LA VOLUME INDEX A4C: 19.9 ML/M2 (ref 16–28)
ECHO LV INTERNAL DIMENSION DIASTOLIC: 4.89 CM (ref 4.2–5.9)
ECHO LV INTERNAL DIMENSION SYSTOLIC: 2.99 CM
ECHO LV ISOVOLUMETRIC RELAXATION TIME (IVRT): 92.3 MS
ECHO LV IVSD: 1.12 CM (ref 0.6–1)
ECHO LV MASS 2D: 245.8 G (ref 88–224)
ECHO LV MASS INDEX 2D: 118.3 G/M2 (ref 49–115)
ECHO LV POSTERIOR WALL DIASTOLIC: 1.14 CM (ref 0.6–1)
ECHO LVOT DIAM: 2.1 CM
ECHO LVOT PEAK GRADIENT: 6.6 MMHG
ECHO LVOT PEAK VELOCITY: 128.07 CM/S
ECHO LVOT VTI: 18.57 CM
ECHO MV A VELOCITY: 69.15 CM/S
ECHO MV E DECELERATION TIME (DT): 282.3 MS
ECHO MV E VELOCITY: 0.43 CM/S
ECHO MV E/A RATIO: 0.01
ERYTHROCYTE [DISTWIDTH] IN BLOOD BY AUTOMATED COUNT: 12.9 % (ref 11.6–14.5)
GLOBULIN SER CALC-MCNC: 3.4 G/DL (ref 2–4)
GLUCOSE SERPL-MCNC: 110 MG/DL (ref 74–99)
HCT VFR BLD AUTO: 45.6 % (ref 36–48)
HDLC SERPL-MCNC: 30 MG/DL (ref 40–60)
HDLC SERPL: 4.7 {RATIO} (ref 0–5)
HGB BLD-MCNC: 16 G/DL (ref 13–16)
LDLC SERPL CALC-MCNC: 71.4 MG/DL (ref 0–100)
LIPID PROFILE,FLP: ABNORMAL
MCH RBC QN AUTO: 32.7 PG (ref 24–34)
MCHC RBC AUTO-ENTMCNC: 35.1 G/DL (ref 31–37)
MCV RBC AUTO: 93.1 FL (ref 74–97)
PLATELET # BLD AUTO: 234 K/UL (ref 135–420)
PMV BLD AUTO: 9.7 FL (ref 9.2–11.8)
POTASSIUM SERPL-SCNC: 4.3 MMOL/L (ref 3.5–5.5)
PROT SERPL-MCNC: 6.6 G/DL (ref 6.4–8.2)
RBC # BLD AUTO: 4.9 M/UL (ref 4.7–5.5)
SODIUM SERPL-SCNC: 141 MMOL/L (ref 136–145)
TRIGL SERPL-MCNC: 198 MG/DL (ref ?–150)
TROPONIN I SERPL-MCNC: <0.02 NG/ML (ref 0–0.04)
TROPONIN I SERPL-MCNC: <0.02 NG/ML (ref 0–0.04)
VLDLC SERPL CALC-MCNC: 39.6 MG/DL
WBC # BLD AUTO: 7.9 K/UL (ref 4.6–13.2)

## 2019-02-26 PROCEDURE — 85730 THROMBOPLASTIN TIME PARTIAL: CPT

## 2019-02-26 PROCEDURE — 80061 LIPID PANEL: CPT

## 2019-02-26 PROCEDURE — 80053 COMPREHEN METABOLIC PANEL: CPT

## 2019-02-26 PROCEDURE — 93306 TTE W/DOPPLER COMPLETE: CPT

## 2019-02-26 PROCEDURE — 99218 HC RM OBSERVATION: CPT

## 2019-02-26 PROCEDURE — 36415 COLL VENOUS BLD VENIPUNCTURE: CPT

## 2019-02-26 PROCEDURE — 84484 ASSAY OF TROPONIN QUANT: CPT

## 2019-02-26 PROCEDURE — 74011250637 HC RX REV CODE- 250/637: Performed by: HOSPITALIST

## 2019-02-26 PROCEDURE — 85027 COMPLETE CBC AUTOMATED: CPT

## 2019-02-26 RX ORDER — MAG HYDROX/ALUMINUM HYD/SIMETH 200-200-20
30 SUSPENSION, ORAL (FINAL DOSE FORM) ORAL
Status: DISCONTINUED | OUTPATIENT
Start: 2019-02-26 | End: 2019-02-26 | Stop reason: HOSPADM

## 2019-02-26 RX ADMIN — THERA TABS 1 TABLET: TAB at 09:58

## 2019-02-26 RX ADMIN — GABAPENTIN 300 MG: 300 CAPSULE ORAL at 09:58

## 2019-02-26 RX ADMIN — Medication 10 ML: at 05:04

## 2019-02-26 RX ADMIN — PANTOPRAZOLE SODIUM 40 MG: 40 TABLET, DELAYED RELEASE ORAL at 09:58

## 2019-02-26 NOTE — PROGRESS NOTES
Bedside shift change report given to Ryan Hastings (oncoming nurse) by Shayna Alba (offgoing nurse). Report included the following information SBAR, Kardex and MAR. Pt resting in bed. No c/o pain. No distress. assisted md with removing drain. Pt to be discharged this evening. Family at bedside. All questions answered. Armbands removed. Education provided.

## 2019-02-26 NOTE — H&P
HISTORY & PHYSICAL Patient: Crystal Strickland MRN: [id number]  CSN: [id number] YOB: 1955  Age: 61 y.o. Sex: male DOA: 2/25/2019 LOS:  LOS: 0 days DOA: 2/25/2019 Assessment/Plan Active Problems: Dyspnea on exertion (2/25/2019) Intermittent chest pain (2/25/2019) Elevated blood pressure reading with diagnosis of hypertension (2/25/2019) Acute bronchitis (2/25/2019) Atypical chest pain (2/25/2019) Plan: 1. Atypical chest pain - serial cardiac enzymes, ASA, Lipitor , Echo in AM  
2. SOB - CTA chest negative for PE 
3. HTN - continue norvasc 4. Recent Abdominal Hernia Surg - ventral hernia DVT PX - SCD - will hold heparin given the fact he just had surg & has a drain in place with some blood in place FC Severity of Signs & Symptoms -- Moderate Risk of adverse events -- Moderate Current Medical Rx Plan - As Above Patient history & comorbidities - Per HPI Discharge Plan -- Home Risk for Readmission -- Low HPI:  
 
Crystal Strickland is a 61 y.o. male who is being admitted for Atypical chest pain & SOB -- he mentions that he had surg last week - Abdominal wall - ventral hernia repair -- says he developed SOB 2-3 days post surg & was concerned that he may have a blood clot. He then developed atypical chest pain today ER eval - EKG - normal , serial cardiac enzymes x2 negative. CTA chest negative for PE Will admit pt for Obs status Past Medical History:  
Diagnosis Date  Arthritis   
 left knee Dr Jessie Pascual 2015  Bright's disease   
 as a child  Degenerative arthritis of lumbar spine 02/2018 Dr Kumar Sue; [facility] showed multilevel disease, bilat foraminal stenosis L4-5, severe bilat foraminal narrowing L5-S1; s/p epidural 4/18  Diverticulosis 10/10 Dr. Oswaldo Tirado  Fatty liver 11/00 Fib-4 was 0.54 from 10/14; US 2/18 showed fatty liver and hepatomegaly  GERD (gastroesophageal reflux disease)   
 and HH on UGI   
 Hip bursitis Dr Aura Polo left   Hyperlipidemia   
 calculated 10 yr risk score 15.2% (2/)  Prediabetes   Renal cysts, acquired, bilateral 2018 Us showed 1.4cm cyst right, 7 cm and 2.5 cm cysts on left  Rhinophyma  S/P cardiac cath   
 nl, EF 60% Dr. Jazmin Fleming  Umbilical hernia Past Surgical History:  
Procedure Laterality Date  HX COLONOSCOPY Dr. Diana Carpenter 10/10 diverticulosis  HX HERNIA REPAIR    
 HX ORTHOPAEDIC   Dr. Tobias Draper left distal clavicle excision and acromioplasty Family History Problem Relation Age of Onset  Other Father   
     cardiomyopathy  Heart Disease Brother Social History Socioeconomic History  Marital status:  Spouse name: Not on file  Number of children: 3  
 Years of education: Not on file  Highest education level: Not on file Occupational History  Occupation: mgr at [employer] Tobacco Use  Smoking status: Former Smoker Last attempt to quit: 2005 Years since quittin.1  Smokeless tobacco: Never Used  Tobacco comment: 60+ py  
Substance and Sexual Activity  Alcohol use: No  
 Drug use: No  
 
 
Prior to Admission medications Medication Sig Start Date End Date Taking? Authorizing Provider  
zinc-[id number]-[id number] 15 mg-50 mg- 1 mg-1 mg cap Take  by mouth. Yes Other, MD Orin  
amLODIPine (NORVASC) 5 mg tablet Take 1 Tab by mouth daily. 19  Yes Tony Calderón MD  
gabapentin (NEURONTIN) 300 mg capsule Take 1 Cap by mouth three (3) times daily. 19  Yes Tyler Bustillo MD  
omeprazole (PRILOSEC) 40 mg capsule TAKE ONE CAPSULE BY MOUTH DAILY 10/23/18  Yes Tony Calderón MD  
acetaminophen (TYLENOL EXTRA STRENGTH) 500 mg tablet Take 1,000 mg by mouth every six (6) hours as needed for Pain.    Yes Provider, Historical  
 cholecalciferol (VITAMIN D3) 1,000 unit tablet Take 2,500 Units by mouth daily. Yes Provider, Historical  
Omega-3 Fatty Acids (FISH OIL) 500 mg cap Take 12,200 mg by mouth. Yes Provider, Historical  
ascorbic acid (VITAMIN C) 500 mg tablet Take  by mouth. Yes Provider, Historical  
multivitamin (ONE A DAY) tablet Take 1 Tab by mouth daily. Yes Provider, Historical  
cyanocobalamin 1,000 mcg tablet Take 1,000 mcg by mouth daily. Yes Provider, Historical  
 
 
Allergies Allergen Reactions  Cymbalta [Duloxetine] Other (comments) [place] funny Review of Systems A comprehensive review of systems was negative except for that written in the History of Present Illness. Physical Exam:  
  
Visit Vitals /87 (BP 1 Location: Left arm, BP Patient Position: At rest) Pulse 79 Temp 98.2 °F (36.8 °C) Resp 18 Ht 5' 10\" (1.778 m) Wt 89.8 kg (198 lb) SpO2 95% BMI 28.41 kg/m² Physical Exam: 
 
Gen: In general, this is a well nourished male in no acute distress HEENT: Sclerae nonicteric. Oral mucous membranes moist. Dentition normal 
Neck: Supple with midline trachea. CV: RRR without murmur or rub appreciated. Resp:Respirations are unlabored without use of accessory muscles. Lung fields B/L without wheezes or rhonchi. Abd: Soft, nontender, nondistended, s/p surg & has an abdominal binder & drain in place Extrem: Extremities are warm, without cyanosis or clubbing. No pitting pretibial edema. Skin: Warm, no visible rashes. Neuro: Patient is alert, oriented, and cooperative. No obvious focal defects. Moves all 4 extremities. Labs Reviewed: 
 
Recent Results (from the past 24 hour(s)) EKG, 12 LEAD, INITIAL Collection Time: 02/25/19 12:03 PM  
Result Value Ref Range Ventricular Rate 68 BPM  
 Atrial Rate 68 BPM  
 P-R Interval 152 ms QRS Duration 84 ms Q-T Interval 382 ms QTC Calculation (Bezet) 406 ms Calculated R Axis -2 degrees Calculated T Axis 52 degrees Diagnosis Normal sinus rhythm Normal ECG When compared with ECG of 21-MAY-2012 07:42, No significant change was found Confirmed by Raquel Hurtado MD, --- ([id number]) on 2/25/2019 5:23:15 PM 
  
CARDIAC PANEL,(CK, CKMB & TROPONIN) Collection Time: 02/25/19 12:05 PM  
Result Value Ref Range  39 - 308 U/L  
 CK - MB 3.7 (H) <3.6 ng/ml CK-MB Index 2.6 0.0 - 4.0 % Troponin-I, QT <0.02 0.0 - 0.045 NG/ML  
CBC WITH AUTOMATED DIFF Collection Time: 02/25/19 12:05 PM  
Result Value Ref Range WBC 8.6 4.6 - 13.2 K/uL  
 RBC 5.26 4.70 - 5.50 M/uL  
 HGB 17.1 (H) 13.0 - 16.0 g/dL HCT 49.5 (H) 36.0 - 48.0 % MCV 94.1 74.0 - 97.0 FL  
 MCH 32.5 24.0 - 34.0 PG  
 MCHC 34.5 31.0 - 37.0 g/dL  
 RDW 12.9 11.6 - 14.5 % PLATELET 771 554 - 097 K/uL MPV 9.3 9.2 - 11.8 FL  
 NEUTROPHILS 66 40 - 73 % LYMPHOCYTES 23 21 - 52 % MONOCYTES 9 3 - 10 % EOSINOPHILS 2 0 - 5 % BASOPHILS 0 0 - 2 %  
 ABS. NEUTROPHILS 5.7 1.8 - 8.0 K/UL  
 ABS. LYMPHOCYTES 1.9 0.9 - 3.6 K/UL  
 ABS. MONOCYTES 0.8 0.05 - 1.2 K/UL  
 ABS. EOSINOPHILS 0.2 0.0 - 0.4 K/UL  
 ABS. BASOPHILS 0.0 0.0 - 0.1 K/UL  
 DF AUTOMATED METABOLIC PANEL, BASIC Collection Time: 02/25/19 12:05 PM  
Result Value Ref Range Sodium 140 136 - 145 mmol/L Potassium 4.6 3.5 - 5.5 mmol/L Chloride 106 100 - 108 mmol/L  
 CO2 27 21 - 32 mmol/L Anion gap 7 3.0 - 18 mmol/L Glucose 99 74 - 99 mg/dL BUN 22 (H) 7.0 - 18 MG/DL Creatinine 1.00 0.6 - 1.3 MG/DL  
 BUN/Creatinine ratio 22 (H) 12 - 20 GFR est AA >60 >60 ml/min/1.73m2 GFR est non-AA >60 >60 ml/min/1.73m2 Calcium 9.0 8.5 - 10.1 MG/DL  
TROPONIN I Collection Time: 02/25/19  4:30 PM  
Result Value Ref Range Troponin-I, QT <0.02 0.0 - 0.045 NG/ML Imaging Reviewed: CTA chest  
 
IMPRESSION IMPRESSION: 
1. No evidence for pulmonary emboli. 2.  Mild emphysema, bronchitis and fibrosis. 3.  Right coronary artery and LAD coronary arteriosclerosis. 4.  Hepatic steatosis. 5.  Left adrenal myelolipoma. 6.  Diverticulosis.  
 
 
Champ Schroeder MD 
2/25/2019, 7:44 PM

## 2019-02-26 NOTE — PROGRESS NOTES
Hospitalist Progress NotePatient: Mihir Segal MRN: [id number]  CSN: [id number] YOB: 1955  Age: 61 y.o. Sex: male DOA: 2/25/2019 LOS:  LOS: 0 days Assessment/Plan Active Problems: Dyspnea on exertion (2/25/2019) Intermittent chest pain (2/25/2019) Elevated blood pressure reading with diagnosis of hypertension (2/25/2019) Acute bronchitis (2/25/2019) Atypical chest pain (2/25/2019) Interval History Pt admitted last evening with atypical chest pain - serial cardiac enzymes x3 negative Echo done report pending [address] Cardiology input Plan -  
 
1. Atypical chest pain - serial cardiac enzymes x3 negative , Echo done - report pending , appreciate Cardiology input - plan for OP stress test if echo is normal - pt not c/o any further episodes of chest pain / SOB 2. HTN - controlled with meds , continue Norvasc 3. Recent ventral Abdominal hernia surg - doing well post op DVT Px - SCD Fc Case discussed with:  []Patient  []Family  []Nursing  []Case Management DVT Prophylaxis:  []Lovenox  []Hep SQ  []SCDs  []Coumadin   []On Heparin gtt Subjective:  
 
CC: No new events overnight Objective:  
  
Visit Vitals /84 (BP 1 Location: Left arm, BP Patient Position: At rest) Pulse 76 Temp 97.9 °F (36.6 °C) Resp 18 Ht 5' 10\" (1.778 m) Wt 89.8 kg (198 lb) SpO2 94% BMI 28.41 kg/m² Physical Exam: 
 
Gen: In general, this is a well nourished male  in no acute distress HEENT: Sclerae nonicteric. Oral mucous membranes moist. Dentition normal  
Neck: Supple with midline trachea. CV: RRR without murmur or rub appreciated. Resp:Respirations are unlabored without use of accessory muscles. Lung fields bilaterally without wheezes or rhonchi. Abd: Soft, nontender, nondistended. Extrem: Extremities are warm, without cyanosis or clubbing. No pitting pretibial edema. Skin: Warm, no visible rashes. Neuro: Patient is alert, oriented, and cooperative. No obvious focal defects. Moves all 4 extremities. Intake and Output: 
Current Shift:  02/26 0701 - 02/26 1900 In: -  
Out: 10 [Drains:10] Last three shifts:  02/24 1901 - 02/26 0700 In: 100 [I.V.:100] Out: - Recent Results (from the past 24 hour(s)) CULTURE, BLOOD Collection Time: 02/25/19  3:00 PM  
Result Value Ref Range Special Requests: NO SPECIAL REQUESTS Culture result: NO GROWTH AFTER 14 HOURS    
CULTURE, BLOOD Collection Time: 02/25/19  3:05 PM  
Result Value Ref Range Special Requests: NO SPECIAL REQUESTS Culture result: NO GROWTH AFTER 14 HOURS    
TROPONIN I Collection Time: 02/25/19  4:30 PM  
Result Value Ref Range Troponin-I, QT <0.02 0.0 - 0.045 NG/ML  
TROPONIN I Collection Time: 02/25/19 10:31 PM  
Result Value Ref Range Troponin-I, QT <0.02 0.0 - 1.548 NG/ML  
METABOLIC PANEL, COMPREHENSIVE Collection Time: 02/26/19  2:49 AM  
Result Value Ref Range Sodium 141 136 - 145 mmol/L Potassium 4.3 3.5 - 5.5 mmol/L Chloride 109 (H) 100 - 108 mmol/L  
 CO2 23 21 - 32 mmol/L Anion gap 9 3.0 - 18 mmol/L Glucose 110 (H) 74 - 99 mg/dL BUN 25 (H) 7.0 - 18 MG/DL Creatinine 1.04 0.6 - 1.3 MG/DL  
 BUN/Creatinine ratio 24 (H) 12 - 20 GFR est AA >60 >60 ml/min/1.73m2 GFR est non-AA >60 >60 ml/min/1.73m2 Calcium 8.3 (L) 8.5 - 10.1 MG/DL Bilirubin, total 0.5 0.2 - 1.0 MG/DL  
 ALT (SGPT) 53 16 - 61 U/L  
 AST (SGOT) 23 15 - 37 U/L Alk. phosphatase 88 45 - 117 U/L Protein, total 6.6 6.4 - 8.2 g/dL Albumin 3.2 (L) 3.4 - 5.0 g/dL Globulin 3.4 2.0 - 4.0 g/dL A-G Ratio 0.9 0.8 - 1.7 LIPID PANEL Collection Time: 02/26/19  2:49 AM  
Result Value Ref Range LIPID PROFILE Cholesterol, total 141 <200 MG/DL  Triglyceride 198 (H) <150 MG/DL  
 HDL Cholesterol 30 (L) 40 - 60 MG/DL  
 LDL, calculated 71.4 0 - 100 MG/DL  
 VLDL, calculated 39.6 MG/DL  
 CHOL/HDL Ratio 4.7 0 - 5.0    
CBC W/O DIFF Collection Time: 02/26/19  2:49 AM  
Result Value Ref Range WBC 7.9 4.6 - 13.2 K/uL  
 RBC 4.90 4.70 - 5.50 M/uL  
 HGB 16.0 13.0 - 16.0 g/dL HCT 45.6 36.0 - 48.0 % MCV 93.1 74.0 - 97.0 FL  
 MCH 32.7 24.0 - 34.0 PG  
 MCHC 35.1 31.0 - 37.0 g/dL  
 RDW 12.9 11.6 - 14.5 % PLATELET 088 804 - 003 K/uL MPV 9.7 9.2 - 11.8 FL  
PTT Collection Time: 02/26/19  2:49 AM  
Result Value Ref Range aPTT 32.1 23.0 - 36.4 SEC  
TROPONIN I Collection Time: 02/26/19  2:49 AM  
Result Value Ref Range Troponin-I, QT <0.02 0.0 - 0.045 NG/ML  
TROPONIN I Collection Time: 02/26/19 10:14 AM  
Result Value Ref Range Troponin-I, QT <0.02 0.0 - 0.045 NG/ML Current Meds - Reviewed Lab Results Component Value Date/Time  Glucose 110 (H) 02/26/2019 02:49 AM  
 Glucose 99 02/25/2019 12:05 PM  
 Glucose 116 (H) 02/04/2019 09:42 AM  
 Glucose 109 (H) 01/12/2018 08:30 AM  
 Glucose 116 (H) 12/30/2016 08:43 AM  
 Glucose 106 (H) 07/12/2011 08:06 AM  
  
 
 
 
Champ Schroeder MD 
2/26/2019, 12:34 PM

## 2019-02-26 NOTE — PROGRESS NOTES
GEN  SURG/CONSULT 
 
61 yrs old  male with  1 week  Post  Open / reduction  Repair of incarcerated  Umbilical  Hernia with Laparoscopic assist Biologic  Mesh  Placement Outpatient( [facility]). No Complications during  Procedure No GI issues except  For post op pains. Takes Tyle # 3 for pain and Miralax for constipation. No fever. Has shortness of  Breath probably  Sec to  Gas distention. Has Atelectasis on adm  CXR. CTA  Neg  For  PE Alert. Awake, no distress noted, smiling to see me ABDOMEN: Binder removed, DRAIN  REMOVED. Taut ,  Wound dressing  Removed, clean/ dry, no cellulitis. Tympanism  To 4  Quadrants , BS present RX: CONTINUE  ABDOMINAL  BINDER USE 
        SIMETHICONE  For GAS DISTENTION 
        AVOID  HEAVY  LIFTING 
         OFFICE WILL CALL HIM  FOR  NEXT  FOLLOW UP 
 
WILL SIGN OFF, CALL IF NEEDED

## 2019-02-26 NOTE — CONSULTS
[facility] - Consult Note    Consultation request by Tessa Porter MD for advice/opinion related to evaluating Intermittent chest pain [R07.9]  Dyspnea on exertion [R06.09]  Elevated blood pressure reading with diagnosis of hypertension [I10]  Intermittent chest pain [R07.9]  Dyspnea on exertion [R06.09]  Elevated blood pressure reading with diagnosis of hypertension [I10]  Acute bronchitis [J20.9]  Atypical chest pain [R07.89]    Date of  Admission: 2/25/2019 11:54 AM   Primary Care Physician:  Mario Stratton MD     Assessment:     Patient Active Problem List   Diagnosis Code    Prediabetes 2011 R73.03    Arthritis Dr Maryann Jung M19.90    GERD without esophagitis K21.9    Diverticulosis of colon without hemorrhage K57.30    Hyperlipidemia E78.5    Primary hypertension I10    Renal cysts, acquired, bilateral N28.1    Degenerative arthritis of lumbar spine M47.816    Dyspnea on exertion R06.09    Intermittent chest pain R07.9    Elevated blood pressure reading with diagnosis of hypertension I10    Acute bronchitis J20.9    Atypical chest pain R07.89     - Atypical Chest Pain - Presentation suggests either musculoskeletal etiology vs pulmonary; exacerbated with cough, deep inspiration;  troponin negative x 3, EKG shows no acute ischemic activity, CTA negative for PE. Patient denies any previous personal cardiac history, though jennings a family history of early cardiac arrest.  Echocardiogram done this afternoon was unremarkable. Normal LVEF, no regional wall motion abnormalities. - Essential Hypertension - Controlled  - Hyperlipidemia - On statins  - Recent hernia surgery (2/21/19)     Plan:     -No further cardiac workup needed. - Plan for OP stress test once he recovers from his acute illness  -  Incentive spirometry  Stable for discharge today from a cardiac standpoint. History of Present Illness:      This is a 61 y.o. male admitted for Intermittent chest pain [R07.9]  Dyspnea on exertion [R06.09]  Elevated blood pressure reading with diagnosis of hypertension [I10]  Intermittent chest pain [R07.9]  Dyspnea on exertion [R06.09]  Elevated blood pressure reading with diagnosis of hypertension [I10]  Acute bronchitis [J20.9]  Atypical chest pain [R07.89]. Patient complains of:  Chest pain. The patient came to the hospital after a 2-3 day episode of shortness of breath and cough following hernia correction surgery on 2/21/19. The patient reported associated non-radiating chest pain, exacerbated principally by cough and deep inspiration, non-exertional.  On arrival to the ED, the patient had negative troponin, and an unremarkable EKG. The patient has an additional medical history of hypertension and HLP, and was recent treated for PNA as an OP. Additionally, he reports a strong family history of early cardiac death, though denies any personal history of MI of CHF. The patient denies any current tobacco or recreational drug use. Cardiology was consulted to manage the above issues. Cardiac risk factors: family history, dyslipidemia, male gender, hypertension      Review of Symptoms:  Except as stated above include:  Constitutional:  negative  Respiratory:  cough  Cardiovascular:  negative  Gastrointestinal: tender to palpation  Genitourinary:  negative  Musculoskeletal:  Negative  Neurological:  Negative  Dermatological:  Incisional tenderness to abdomen  Endocrinological: Negative  Psychological:  Negative    Pertinent items are noted in HPI.      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 Summer Tirado liver 11/00    Fib-4 was 0.54 from 10/14; US  showed fatty liver and hepatomegaly    GERD (gastroesophageal reflux disease)     and HH on UGI     Hip bursitis     Dr Cotto Artist left     Hyperlipidemia     calculated 10 yr risk score 15.2% (2/)    Prediabetes      Renal cysts, acquired, bilateral 2018    Us showed 1.4cm cyst right, 7 cm and 2.5 cm cysts on left    Rhinophyma     S/P cardiac cath 2000    nl, EF 60% Dr. Shane Stephenson Umbilical hernia          Social History:     Social History     Socioeconomic History    Marital status:      Spouse name: Not on file    Number of children: 3    Years of education: Not on file    Highest education level: Not on file   Occupational History    Occupation: mgr at [employer]   Tobacco Use    Smoking status: Former Smoker     Last attempt to quit: 2005     Years since quittin.1    Smokeless tobacco: Never Used    Tobacco comment: 60+ py   Substance and Sexual Activity    Alcohol use: No    Drug use: No        Family History:     Family History   Problem Relation Age of Onset    Other Father         cardiomyopathy    Heart Disease Brother         Medications: Allergies   Allergen Reactions    Cymbalta [Duloxetine] Other (comments)     Felt funny        Current Facility-Administered Medications   Medication Dose Route Frequency    pantoprazole (PROTONIX) tablet 40 mg  40 mg Oral ACB    gabapentin (NEURONTIN) capsule 300 mg  300 mg Oral TID    therapeutic multivitamin (THERAGRAN) tablet 1 Tab  1 Tab Oral DAILY    acetaminophen (TYLENOL) tablet 650 mg  650 mg Oral Q6H PRN    ondansetron (ZOFRAN) injection 4 mg  4 mg IntraVENous Q6H PRN    sodium chloride (NS) flush 5-40 mL  5-40 mL IntraVENous Q8H    sodium chloride (NS) flush 5-40 mL  5-40 mL IntraVENous PRN    simvastatin (ZOCOR) tablet 20 mg  20 mg Oral QHS    amLODIPine (NORVASC) tablet 5 mg  5 mg Oral QHS         Physical Exam:     Visit Vitals  /84 (BP 1 Location: Left arm, BP Patient Position:  At rest)   Pulse 76   Temp 97.9 °F (36.6 °C)   Resp 18   Ht 5' 10\" (1.778 m)   Wt 198 lb (89.8 kg)   SpO2 94%   BMI 28.41 kg/m²     BP Readings from Last 3 Encounters:   02/26/19 136/84   02/12/19 (!) 162/98   02/07/19 138/88     Pulse Readings from Last 3 Encounters:   02/26/19 76   02/12/19 (!) 57   02/07/19 65     Wt Readings from Last 3 Encounters:   02/25/19 198 lb (89.8 kg)   02/12/19 205 lb (93 kg)   02/07/19 200 lb (90.7 kg)       General:  alert, cooperative, no distress, appears stated age  Neck:  nontender  Lungs:  diminished breath sounds R base, L base  Heart:  regular rate and rhythm, S1, S2 normal, no murmur, click, rub or gallop  Abdomen:  moderate tenderness in the in the upper abdomen; abdominal binder in place  Extremities:  extremities normal, atraumatic, no cyanosis or edema  Skin: Warm and dry.  no hyperpigmentation, vitiligo, or suspicious lesions; surgical site deferred  Neuro: alert, oriented x 3, affect appropriate, no focal neurological deficits, no involuntary movements  Psych: non focal     Data Review:     Recent Labs     02/26/19  0249 02/25/19  1205   WBC 7.9 8.6   HGB 16.0 17.1*   HCT 45.6 49.5*    277     Recent Labs     02/26/19  0249 02/25/19  1205    140   K 4.3 4.6   * 106   CO2 23 27   * 99   BUN 25* 22*   CREA 1.04 1.00   CA 8.3* 9.0   ALB 3.2*  --    SGOT 23  --    ALT 53  --        Results for orders placed or performed during the hospital encounter of 02/25/19   EKG, 12 LEAD, INITIAL   Result Value Ref Range    Ventricular Rate 68 BPM    Atrial Rate 68 BPM    P-R Interval 152 ms    QRS Duration 84 ms    Q-T Interval 382 ms    QTC Calculation (Bezet) 406 ms    Calculated R Axis -2 degrees    Calculated T Axis 52 degrees    Diagnosis       Normal sinus rhythm  Normal ECG  When compared with ECG of 21-MAY-2012 07:42,  No significant change was found  Confirmed by Demetrius Gacria MD, --- ([id number]) on 2/25/2019 5:23:15 PM         All Cardiac Markers in the last 24 hours:    Lab Results   Component Value Date/Time     02/25/2019 12:05 PM    CKMB 3.7 (H) 02/25/2019 12:05 PM    CKND1 2.6 02/25/2019 12:05 PM    TROIQ <0.02 02/26/2019 02:49 AM    TROIQ <0.02 02/25/2019 10:31 PM    TROIQ <0.02 02/25/2019 04:30 PM    TROIQ <0.02 02/25/2019 12:05 PM       Last Lipid:    Lab Results   Component Value Date/Time    Cholesterol, total 141 02/26/2019 02:49 AM    HDL Cholesterol 30 (L) 02/26/2019 02:49 AM    LDL, calculated 71.4 02/26/2019 02:49 AM    Triglyceride 198 (H) 02/26/2019 02:49 AM    CHOL/HDL Ratio 4.7 02/26/2019 02:49 AM       Signed By: Louis Farrar NP     February 26, 2019      Susannah Morrow MD

## 2019-02-26 NOTE — ROUTINE PROCESS
Bedside shift change report given to  Sherman Osorio (oncoming nurse) by  Stacia Dudley (offgoing nurse). Report included the following information SBAR, Kardex, ED Summary and Recent Results.

## 2019-02-26 NOTE — PROGRESS NOTES
conducted an initial consultation and Spiritual Assessment for Crystal Strickland, who is a 61 y.o.,male. Patients Primary Language is: [place]. According to the patients EMR Restorationism Affiliation is: No Restorationist. The reason the Patient came to the hospital is:  
Patient Active Problem List  
 Diagnosis Date Noted  Dyspnea on exertion 02/25/2019  Intermittent chest pain 02/25/2019  Elevated blood pressure reading with diagnosis of hypertension 02/25/2019  Acute bronchitis 02/25/2019  Atypical chest pain 02/25/2019  Renal cysts, acquired, bilateral 02/12/2019  Degenerative arthritis of lumbar spine 02/12/2019  Hyperlipidemia 01/20/2018  Primary hypertension 01/20/2018  Arthritis Dr Jessie Pascual 12/22/2015  GERD without esophagitis 12/22/2015  Diverticulosis of colon without hemorrhage 12/22/2015  Prediabetes 2011 03/13/2012 The  provided the following Interventions: 
Initiated a relationship of care and support. Explored issues of maye, belief, spirituality and Religion/ritual needs while hospitalized. Listened empathically. Provided chaplaincy education. Provided information about Spiritual Care Services. Offered prayer and assurance of continued prayers on patient's behalf. Chart reviewed. The following outcomes where achieved: 
Patient shared limited information about both their medical narrative and spiritual journey/beliefs.  confirmed Patient's Restorationism Affiliation is Holiness. Patient processed feeling about current hospitalization. Patient expressed gratitude for 's visit. Assessment: 
Patient does not have any Religion/cultural needs that will affect patients preferences in health care. There are no spiritual or Religion issues which require intervention at this time. Plan: 
Chaplains will continue to follow and will provide pastoral care on an as needed/requested basis.  recommends bedside caregivers page  on duty if patient shows signs of acute spiritual or emotional distress. Chaplain Tressa Earl [facility]  
[phone]

## 2019-02-26 NOTE — PROGRESS NOTES
DISCHARGE PLANNING: 
Reason for Admission:   INTERMITTENT  CHEST PAIN 
                
RRAT Score:     3 Plan for utilizing home health:     NOT ORDERED Likelihood of Readmission:  NONE Transition of Care Plan:      [address] Care Management Interventions PCP Verified by CM: Yes(Colin Ramon) Palliative Care Criteria Met (RRAT>21 & CHF Dx)?: No 
Mode of Transport at Discharge: Other (see comment) Transition of Care Consult (CM Consult): Discharge Planning Current Support Network: Own Home, Family Lives [facility] Follow Up Transport: Family Plan discussed with Pt/Family/Caregiver: Yes [employer] Information Provided?: No 
Discharge Location Discharge Placement: Home with home health Pt reports that he has no discharge needs. [employer] [phone] Care Manager

## 2019-02-26 NOTE — DISCHARGE SUMMARY
Hospitalist Discharge Summary    Patient: Brandi Mckeon MRN: [id number]  CSN: [id number]    YOB: 1955  Age: 61 y.o. Sex: male    DOA: 2/25/2019 LOS:  LOS: 0 days   Discharge Date:      Admission Diagnoses: Intermittent chest pain [R07.9]  Dyspnea on exertion [R06.09]  Elevated blood pressure reading with diagnosis of hypertension [I10]  Intermittent chest pain [R07.9]  Dyspnea on exertion [R06.09]  Elevated blood pressure reading with diagnosis of hypertension [I10]  Acute bronchitis [J20.9]  Atypical chest pain [R07.89]    Discharge Diagnoses:    1. Atypical chest pain - resolved   2. Recent Abd ventral hernia sirg   3. H/o HTN     Discharge Condition: Good    Discharge To: Home    Hospital Course:   Brandi Mckeon is a 61 y.o. male who is being admitted for Atypical chest pain & SOB -- he mentions that he had surg last week - Abdominal wall - ventral hernia repair -- says he developed SOB 2-3 days post surg & was concerned that he may have a blood clot. He then developed atypical chest pain today   ER eval - EKG - normal , serial cardiac enzymes x2 negative. CTA chest negative for PE   Will admit pt for Obs status     Pt admitted to the hosp with atypical chest pain - EKG normal , trop x 3 negative - pt seen by cardiology & underwent Echo - results attached below - Normal   Pt is being discharged Home & is advised to f/u with PCP & cardiology for out pt stress test     Consults: Cardiology    Significant Diagnostic Studies: Echo   · Estimated left ventricular ejection fraction is 66 - 70%. Left ventricular mild concentric hypertrophy. Normal left ventricular wall motion, no regional wall motion abnormality noted. Age-appropriate left ventricular diastolic function. · Mild aortic root dilatation.     Discharge Medications:     Current Discharge Medication List      CONTINUE these medications which have CHANGED    Details   Omega-3 Fatty Acids (FISH OIL) 500 mg cap Take 500 mg by mouth two (2) times a day. Qty: 60 Cap, Refills: 0         CONTINUE these medications which have NOT CHANGED    Details   zinc-colostrum-egg-[id number] 15 mg-50 mg- 1 mg-1 mg cap Take  by mouth. amLODIPine (NORVASC) 5 mg tablet Take 1 Tab by mouth daily. Qty: 30 Tab, Refills: 5    Associated Diagnoses: Primary hypertension      gabapentin (NEURONTIN) 300 mg capsule Take 1 Cap by mouth three (3) times daily. Qty: 90 Cap, Refills: 11    Associated Diagnoses: Lumbar radiculopathy; Spinal stenosis of lumbar region with neurogenic claudication      omeprazole (PRILOSEC) 40 mg capsule TAKE ONE CAPSULE BY MOUTH DAILY  Qty: 90 Cap, Refills: 3    Associated Diagnoses: GERD without esophagitis      acetaminophen (TYLENOL EXTRA STRENGTH) 500 mg tablet Take 1,000 mg by mouth every six (6) hours as needed for Pain. cholecalciferol (VITAMIN D3) 1,000 unit tablet Take 2,500 Units by mouth daily. ascorbic acid (VITAMIN C) 500 mg tablet Take  by mouth.      multivitamin (ONE A DAY) tablet Take 1 Tab by mouth daily. cyanocobalamin 1,000 mcg tablet Take 1,000 mcg by mouth daily.              Activity: Activity as tolerated    Diet: Cardiac Diet    Wound Care: None needed    Follow-up: with PCP & cardiology in 2 weeks     Macy Sommers MD  2/26/2019, 3:42 PM

## 2019-02-26 NOTE — DISCHARGE INSTRUCTIONS
DISCHARGE SUMMARY from Nurse    PATIENT INSTRUCTIONS:    After general anesthesia or intravenous sedation, for 24 hours or while taking prescription Narcotics:  · Limit your activities  · Do not drive and operate hazardous machinery  · Do not make important personal or business decisions  · Do  not drink alcoholic beverages  · If you have not urinated within 8 hours after discharge, please contact your surgeon on call. Report the following to your surgeon:  · Excessive pain, swelling, redness or odor of or around the surgical area  · Temperature over 100.5  · Nausea and vomiting lasting longer than 4 hours or if unable to take medications  · Any signs of decreased circulation or nerve impairment to extremity: change in color, persistent  numbness, tingling, coldness or increase pain  · Any questions    What to do at Home:  Recommended activity: Activity as tolerated,     If you experience any of the following symptoms shortness of breath,unexplained swelling, fever of 100.1 or greater, facial drooping, chest pain please follow up with md or go to ed. *  Please give a list of your current medications to your Primary Care Provider. *  Please update this list whenever your medications are discontinued, doses are      changed, or new medications (including over-the-counter products) are added. *  Please carry medication information at all times in case of emergency situations. These are general instructions for a healthy lifestyle:    No smoking/ No tobacco products/ Avoid exposure to second hand smoke  Surgeon General's Warning:  Quitting smoking now greatly reduces serious risk to your health.     Obesity, smoking, and sedentary lifestyle greatly increases your risk for illness    A healthy diet, regular physical exercise & weight monitoring are important for maintaining a healthy lifestyle    You may be retaining fluid if you have a history of heart failure or if you experience any of the following symptoms:  Weight gain of 3 pounds or more overnight or 5 pounds in a week, increased swelling in our hands or feet or shortness of breath while lying flat in bed. Please call your doctor as soon as you notice any of these symptoms; do not wait until your next office visit. Recognize signs and symptoms of STROKE:    F-face looks uneven    A-arms unable to move or move unevenly    S-speech slurred or non-existent    T-time-call 911 as soon as signs and symptoms begin-DO NOT go       Back to bed or wait to see if you get better-TIME IS BRAIN. Warning Signs of HEART ATTACK     Call 911 if you have these symptoms:   Chest discomfort. Most heart attacks involve discomfort in the center of the chest that lasts more than a few minutes, or that goes away and comes back. It can feel like uncomfortable pressure, squeezing, fullness, or pain.  Discomfort in other areas of the upper body. Symptoms can include pain or discomfort in one or both arms, the back, neck, jaw, or stomach.  Shortness of breath with or without chest discomfort.  Other signs may include breaking out in a cold sweat, nausea, or lightheadedness. Don't wait more than five minutes to call 911 - MINUTES MATTER! Fast action can save your life. Calling 911 is almost always the fastest way to get lifesaving treatment. Emergency Medical Services staff can begin treatment when they arrive -- up to an hour sooner than if someone gets to the hospital by car. The discharge information has been reviewed with the patient. The patient verbalized understanding. Discharge medications reviewed with the patient and appropriate educational materials and side effects teaching were provided.   ___________________________________________________________________________________________________________________________________

## 2019-02-26 NOTE — PROGRESS NOTES
Completed echocardiogram. Report to follow. Patient to be transported back to room.  
 
Doc Mary, ANGELY, RDCS

## 2019-02-27 NOTE — CONSULTS
[address] [facility]    Name:  Lili Cristobal  MR#:   [id number]  :  1955  ACCOUNT #:  [de-identified]  DATE OF SERVICE:  2019    REASON FOR CONSULTATION:  Followup surgical abdominal procedure, admitted for chest pains. HISTORY OF PRESENT ILLNESS:  This 79-year-old gentleman was admitted yesterday on 2019 for chest pains. He apparently had severe chest pains of upper chest and this was the reason that he was admitted. Workup so far at this point is negative for cardiac issues; however, a completion of cardiac workup is in process. The patient gives a history of coughing out some blood, he says. Workup for PE by CT is negative. This gentleman is known to me. He had undergone an open repair/reduction of an incarcerated umbilical hernia with laparoscopic assisted mesh placement using biologic mesh ST. The procedure was done as an outpatient at [address] [place] [address] and was sent home the same morning. The patient was sent home on a binder. Apparently, he has been eating and takes MiraLax for some constipation, which he was advised to do so because of the side effect of opioid medicine like Tylenol with codeine. It was also noted that the patient has associated colon distention in the form of tympanism, and it was advised to get patient's bowels normalized taking a laxative if needed. The patient denied any fever. Denies any nausea or vomiting but has some abdominal pain and controlled pretty much with pain medicines that he takes and the abdominal binder that he uses. The patient has other medical issues like atherosclerotic cardiovascular disease, some recent history of bronchitis, hypertension with a history of heavy smoking in the past.    ALLERGIES:  TO CYMBALTA. PHYSICAL EXAMINATION:  The patient is alert, awake, and oriented, appears to be happy and stable, and the patient was examined in the presence of his nurse. The examination centered mainly in the abdomen. The abdominal binder was removed. Dressings was removed. The wound is clean and dry with a normal-appearing navel and a clean wound. No evidence of cellulitis or hematoma formation. The drain has drained minimal amounts since it was put in, draining not even 10cc  today and appears to be serous in color. The drain was removed. The abdomen is mildly distended with some tympanism still but pretty much no change from previous exam.    IMPRESSION:  1. Status post postoperative now seven days, open reduction and repair of an incarcerated umbilical hernia with laparoscopic assisted mesh placement (biologic mesh ST, Phasix ST type of graft). 2.  Atelectasis by chest x-ray probably secondary to gas distention from the bowel, which can aggravate or worsen some chest pains that he has, stable however. RECOMMENDATION:  Continue the use of binder. Would recommend placing this gentleman on simethicone with antacid and to continue H2 blockers in the form of Pepcid. We will not follow this patient actively, but we will see him in the office on an outpatient basis.       Shepard Goodell, MD      [id number]  D:  02/26/2019 13:27  T:  02/27/2019 1:40  JOB #:  [id number]  CC:  Cecil James MD

## 2019-03-03 LAB
BACTERIA SPEC CULT: NORMAL
BACTERIA SPEC CULT: NORMAL
SERVICE CMNT-IMP: NORMAL
SERVICE CMNT-IMP: NORMAL

## 2019-03-28 ENCOUNTER — OFFICE VISIT (OUTPATIENT)
Dept: ORTHOPEDIC SURGERY | Facility: CLINIC | Age: 64
End: 2019-03-28

## 2019-03-28 VITALS
TEMPERATURE: 97.2 F | DIASTOLIC BLOOD PRESSURE: 82 MMHG | HEIGHT: 70 IN | OXYGEN SATURATION: 96 % | RESPIRATION RATE: 18 BRPM | SYSTOLIC BLOOD PRESSURE: 141 MMHG | WEIGHT: 201 LBS | HEART RATE: 60 BPM | BODY MASS INDEX: 28.77 KG/M2

## 2019-03-28 DIAGNOSIS — M25.551 RIGHT HIP PAIN: ICD-10-CM

## 2019-03-28 DIAGNOSIS — M25.561 ACUTE PAIN OF RIGHT KNEE: ICD-10-CM

## 2019-03-28 DIAGNOSIS — M16.11 PRIMARY OSTEOARTHRITIS OF RIGHT HIP: ICD-10-CM

## 2019-03-28 DIAGNOSIS — M17.11 PRIMARY OSTEOARTHRITIS OF RIGHT KNEE: Primary | ICD-10-CM

## 2019-03-28 RX ORDER — TRIAMCINOLONE ACETONIDE 40 MG/ML
40 INJECTION, SUSPENSION INTRA-ARTICULAR; INTRAMUSCULAR ONCE
Qty: 1 ML | Refills: 0
Start: 2019-03-28 | End: 2019-03-28

## 2019-03-28 NOTE — PROGRESS NOTES
Patient: Sage Beauchamp                MRN: [id number]       SSN: [id number] YOB: 1955        AGE: 61 y.o. SEX: male Body mass index is 28.84 kg/m². PCP: Minal Angeles MD 
03/28/19 HISTORY:  I had the pleasure of reviewing Gil Taylor. It has been about a year or so. He does have significant spinal stenosis and got an injection. It was very helpful for him. He is on Lyrica apparently now, which is helping, but he is complaining mainly of right knee pain. The right knee pain is worse with kneeling and stairs. There is no true locking or giving way, although it does swell, and ice helps quite a bit. He has some pain at night, and if he twists the knee, it can be quite tender for him. His back is doing better with the injection. He does not have too many complaints of hip pain. He denies radiculopathy. He denies fevers, chills, night sweats, or weight loss. PHYSICAL EXAMINATION:  On examination today, he looks well, well-nourished, and well-developed. He is accompanied by his wife today. He is in no acute distress. He is healthy looking. The hips rotate nicely. The right knee has medial joint line tenderness. Adelso's test is equivocal.  There is 1+ ACL. The quadriceps are intact. There is full motion and a slight effusion. The skin is normal.  The calf is nontender. Sensation is normal.  There is no foot drop. RADIOGRAPHS:  Review of his x-rays shows mild to moderate arthritis noted on the x-rays. The hips have just mild degenerative changes. Previous MRIs of the hips and femur are negative and a previous bone scan as well. PROCEDURE:  Under aseptic conditions and after informed, written consent with a time out, the right knee was injected with the 1 mL Kenalog preparation, at 40 mg per mL, which was well tolerated.  
 
PLAN:  At this point, I have explained that he does have some arthritis of the knee. There is a possibility of a meniscal tear. They would like to try with an injection. I think that is very reasonable. He will return to see us in about three weeks time. If he is not pleased with the injection, we will be proceeding with MRI evaluation for the right knee. cc: Brad Thomas M.D. REVIEW OF SYSTEMS:   
 
CON: negative for weight loss, fever EYE: negative for double vision ENT: negative for hoarseness RS:   negative for Tb 
GI:    negative for blood in stool :  negative for blood in urine Other systems reviewed and noted below. Past Medical History:  
Diagnosis Date  Arthritis   
 left knee Dr Janelle Marte 2015  Bright's disease   
 as a child  Degenerative arthritis of lumbar spine 02/2018 Dr Corie Richard; Arturo Landers showed multilevel disease, bilat foraminal stenosis L4-5, severe bilat foraminal narrowing L5-S1; s/p epidural 4/18  Diverticulosis 10/10 Dr. Rios Bhavesh  Fatty liver 11/00 Fib-4 was 0.54 from 10/14; US 2/18 showed fatty liver and hepatomegaly  GERD (gastroesophageal reflux disease)   
 and HH on UGI 11/00  
 Hip bursitis Dr Janelle Marte left 2015  Hyperlipidemia   
 calculated 10 yr risk score 15.2% (2/19)  Prediabetes 2011  Renal cysts, acquired, bilateral 02/2018 Us showed 1.4cm cyst right, 7 cm and 2.5 cm cysts on left  Rhinophyma  S/P cardiac cath 2000  
 nl, EF 60% Dr. Zuleika Ortiz  Umbilical hernia Family History Problem Relation Age of Onset  Other Father   
     cardiomyopathy  Heart Disease Brother Current Outpatient Medications Medication Sig Dispense Refill  triamcinolone acetonide (KENALOG) 40 mg/mL injection 1 mL by Intra artICUlar route once for 1 dose. 1 mL 0  
 Omega-3 Fatty Acids (FISH OIL) 500 mg cap Take 500 mg by mouth two (2) times a day. 60 Cap 0  
 zinc-colostrum-[id number] 15 mg-50 mg- 1 mg-1 mg cap Take  by mouth.  amLODIPine (NORVASC) 5 mg tablet Take 1 Tab by mouth daily. 30 Tab 5  
 gabapentin (NEURONTIN) 300 mg capsule Take 1 Cap by mouth three (3) times daily. 90 Cap 11  
 omeprazole (PRILOSEC) 40 mg capsule TAKE ONE CAPSULE BY MOUTH DAILY 90 Cap 3  
 acetaminophen (TYLENOL EXTRA STRENGTH) 500 mg tablet Take 1,000 mg by mouth every six (6) hours as needed for Pain.  cholecalciferol (VITAMIN D3) 1,000 unit tablet Take 2,500 Units by mouth daily.  ascorbic acid (VITAMIN C) 500 mg tablet Take  by mouth.  multivitamin (ONE A DAY) tablet Take 1 Tab by mouth daily.  cyanocobalamin 1,000 mcg tablet Take 1,000 mcg by mouth daily. Allergies Allergen Reactions  Cymbalta [Duloxetine] Other (comments) Felt funny Past Surgical History:  
Procedure Laterality Date  HX COLONOSCOPY Dr. Jax Thomas 10/10 diverticulosis  HX HERNIA REPAIR    
 HX ORTHOPAEDIC  2012 Dr. Minda Corrigan left distal clavicle excision and acromioplasty Social History Socioeconomic History  Marital status:  Spouse name: Not on file  Number of children: 3  
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  
  Worry: Not on file Inability: Not on file  Transportation needs:  
  Medical: Not on file Non-medical: Not on file Tobacco Use  Smoking status: Former Smoker Last attempt to quit: 2005 Years since quittin.2  Smokeless tobacco: Never Used  Tobacco comment: 60+ py  
Substance and Sexual Activity  Alcohol use: No  
 Drug use: No  
 Sexual activity: Not on file Lifestyle  Physical activity:  
  Days per week: Not on file Minutes per session: Not on file  Stress: Not on file Relationships  Social connections:  
  Talks on phone: Not on file Gets together: Not on file Attends Uats[employer] service: Not on file Active member of club or organization: Not on file Attends meetings of clubs or organizations: Not on file Relationship status: Not on file  Intimate partner violence:  
  Fear of current or ex partner: Not on file Emotionally abused: Not on file Physically abused: Not on file Forced sexual activity: Not on file Other Topics Concern  Not on file Social History Narrative  Not on file Visit Vitals /82 Pulse 60 Temp 97.2 °F (36.2 °C) (Oral) Resp 18 Ht 5' 10\" (1.778 m) Wt 201 lb (91.2 kg) SpO2 96% BMI 28.84 kg/m² PHYSICAL EXAMINATION: 
GENERAL: Alert and oriented x3, in no acute distress, well-developed, well-nourished, afebrile. HEART: No JVD. EYES: No scleral icterus NECK: No significant lymphadenopathy LUNGS: No respiratory compromise or indrawing ABDOMEN: Soft, non-tender, non-distended. Electronically signed by:  Kenna Herron MD

## 2019-05-07 ENCOUNTER — OFFICE VISIT (OUTPATIENT)
Dept: ORTHOPEDIC SURGERY | Age: 64
End: 2019-05-07

## 2019-05-07 VITALS
HEART RATE: 61 BPM | RESPIRATION RATE: 18 BRPM | TEMPERATURE: 97.3 F | DIASTOLIC BLOOD PRESSURE: 90 MMHG | HEIGHT: 70 IN | BODY MASS INDEX: 28.84 KG/M2 | SYSTOLIC BLOOD PRESSURE: 141 MMHG

## 2019-05-07 DIAGNOSIS — M79.18 CERVICAL MYOFASCIAL PAIN SYNDROME: ICD-10-CM

## 2019-05-07 DIAGNOSIS — M54.16 LUMBAR RADICULOPATHY: Primary | ICD-10-CM

## 2019-05-07 DIAGNOSIS — M48.062 SPINAL STENOSIS OF LUMBAR REGION WITH NEUROGENIC CLAUDICATION: ICD-10-CM

## 2019-05-07 DIAGNOSIS — M54.2 CERVICAL PAIN: ICD-10-CM

## 2019-05-07 RX ORDER — GABAPENTIN 800 MG/1
800 TABLET ORAL 3 TIMES DAILY
Qty: 90 TAB | Refills: 5 | Status: SHIPPED | OUTPATIENT
Start: 2019-05-07 | End: 2019-07-02 | Stop reason: SDUPTHER

## 2019-05-07 NOTE — PROGRESS NOTES
[address]  [address]  [place] [address]  Phone: [phone]  Fax: [phone]        Marycarmen Juradoland  : 1955  PCP: Hoa García MD  2019    PROGRESS NOTE      HISTORY OF PRESENT ILLNESS  Leta Mcgarry is a 61 y.o. male. He was seen as a new patient 3/6/18 with c/o right thigh pain that progressively worsened x 8 months. He described the pain as a right thigh pain that sometimes caused numbness, tingling, or throbbing pain. He reported weakness walking up steps. He noted that his pain improved if he straightened his leg completely. He was previously on Gabapentin and felt like the \"room was spinning. \" He decided to not take Lyrica for this reason, especially since he is caring for and lifting his father-in-law who recently had a stroke. He is recently retired. He worked on submarines in the past. He notes he has had increased pain since his last visit. He has had more pain radiating from his low back radiating into the posterior aspect of his BLE. He also continues to have pain radiating into his bilateral thighs, but the pain in the posterior aspect of his BLE is causing more of his pain today. He has seen some relief using Aleve. He has not seen any relief with the Amitriptyline or the Salonpas. He found significant relief from an L2-3 interlaminar injection. He notes once he tried the Gabapentin for longer than 2 days, he was able to tolerate the medication well. He is currently taking Gabapentin 300mg BID, and he is tolerating it well. He is interested in increasing it to TID. He found 100% relief from the injection (L2-3 interlaminar) for 48 hours, however, he continues to feel about 95% relief. The residual pain is managed well with Gabapentin. He also c/o a newer neck pain and decreased ROM to the L. He previously had an arthroscopic surgery on his L shoulder, but he continues to have some residual pain.     Alex Zuniga Jacklyn Isbell comes in to the office today for 3 month f/u. He has found significant relief from Gabapentin 600mg TID, but if he does not take them on time, he can feel a return of his pain. ASSESSMENT  His residual pain likely remains due to lumbar spinal stenosis vs lumbar radiculopathy. PLAN  1. Increase Gabapentin to 800 mg TID. Pt will f/u in 3 months or sooner as needed. Diagnoses and all orders for this visit:    1. Lumbar radiculopathy  -     gabapentin (NEURONTIN) 800 mg tablet; Take 1 Tab by mouth three (3) times daily. 2. Spinal stenosis of lumbar region with neurogenic claudication  -     gabapentin (NEURONTIN) 800 mg tablet; Take 1 Tab by mouth three (3) times daily. 3. Cervical pain    4. Cervical myofascial pain syndrome               PAST MEDICAL HISTORY   Past Medical History:   Diagnosis Date    Arthritis     left knee Dr Sara Ochoa 2015    Bright's disease     as a child    Degenerative arthritis of lumbar spine 02/2018    Dr Daniel Clifton; mri showed multilevel disease, bilat foraminal stenosis L4-5, severe bilat foraminal narrowing L5-S1; s/p epidural 4/18    Diverticulosis 10/10    Dr. Naldo Leonard liver 11/00    Fib-4 was 0.54 from 10/14; US 2/18 showed fatty liver and hepatomegaly    GERD (gastroesophageal reflux disease)     and HH on UGI 11/00    Hip bursitis     Dr Sara Ochoa left 2015    Hyperlipidemia     calculated 10 yr risk score 15.2% (2/19)    Prediabetes 2011     Renal cysts, acquired, bilateral 02/2018    Us showed 1.4cm cyst right, 7 cm and 2.5 cm cysts on left    Rhinophyma     S/P cardiac cath 2000    nl, EF 60% Dr. Mace Height Umbilical hernia        Past Surgical History:   Procedure Laterality Date    HX COLONOSCOPY      Dr. Justin Baez 10/10 diverticulosis    HX HERNIA REPAIR      HX ORTHOPAEDIC  2012    Dr. Davonte Stovall left distal clavicle excision and acromioplasty   .       MEDICATIONS    Current Outpatient Medications   Medication Sig Dispense Refill    Omega-3 Fatty Acids (FISH OIL) 500 mg cap Take 500 mg by mouth two (2) times a day. 60 Cap 0    zinc-colostrum-egg-[id number] 15 mg-50 mg- 1 mg-1 mg cap Take  by mouth.  amLODIPine (NORVASC) 5 mg tablet Take 1 Tab by mouth daily. 30 Tab 5    gabapentin (NEURONTIN) 300 mg capsule Take 1 Cap by mouth three (3) times daily. 90 Cap 11    omeprazole (PRILOSEC) 40 mg capsule TAKE ONE CAPSULE BY MOUTH DAILY 90 Cap 3    acetaminophen (TYLENOL EXTRA STRENGTH) 500 mg tablet Take 1,000 mg by mouth every six (6) hours as needed for Pain.  cholecalciferol (VITAMIN D3) 1,000 unit tablet Take 2,500 Units by mouth daily.  ascorbic acid (VITAMIN C) 500 mg tablet Take  by mouth.  multivitamin (ONE A DAY) tablet Take 1 Tab by mouth daily.  cyanocobalamin 1,000 mcg tablet Take 1,000 mcg by mouth daily. 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 BLE pain          PHYSICAL EXAMINATION  There were no vitals taken for this visit. Pain Assessment  3/28/2019   Location of Pain Knee   Location Modifiers Right   Severity of Pain 7   Quality of Pain Aching; Throbbing   Quality of Pain Comment -   Duration of Pain Persistent   Frequency of Pain Constant   Aggravating Factors Walking;Bending;Standing   Aggravating Factors Comment -   Limiting Behavior Yes   Relieving Factors Ice;Rest;Heat;Elevation   Relieving Factors Comment -   Result of Injury No           Constitutional:  Well developed, well nourished, in no acute distress. Psychiatric: Affect and mood are appropriate. Integumentary: No rashes or abrasions noted on exposed areas. SPINE/MUSCULOSKELETAL EXAM    Cervical spine:  Neck is midline. Normal muscle tone. No focal atrophy is noted. ROM pain free. Shoulder ROM intact. No tenderness to palpation. Negative Spurling's sign. Negative Tinel's sign. Negative Tovar's sign.       Sensation in the bilateral arms grossly intact to light touch.       Lumbar spine:  No rash, ecchymosis, or gross obliquity. No fasciculations. No focal atrophy is noted. No pain with hip ROM. Full range of motion. No tenderness to palpation. No tenderness to palpation at the sciatic notch. SI joints non-tender. Trochanters non tender.      Decreased sensation L1/L2 on the right side.     Updates from 2/7/19:  Tenderness to palpation of scalenes and trapezius on the L  Decreased neck ROM to the L  Decreased ROM of L shoulder      MOTOR:      Biceps  Triceps Deltoids Wrist Ext Wrist Flex Hand Intrin   Right 5/5 5/5 5/5 5/5 5/5 5/5   Left 5/5 5/5 5/5 5/5 5/5 5/5             Hip Flex  Quads Hamstrings Ankle DF EHL Ankle PF   Right 5/5 5/5 5/5 5/5 5/5 5/5   Left 5/5 5/5 5/5 5/5 5/5 5/5     DTRs are 2+ biceps, triceps, brachioradialis, patella, and Achilles.      Negative Straight Leg raise. Squat not tested. No difficulty with tandem gait.       Ambulation without assistive device. FWB.       RADIOGRAPHS  Lumbar MRI images taken on 2/20/18 personally reviewed with patient:  FINDINGS:       Sagittal images show slightly straightened lordosis.  There is no vertebral body  compression deformity or significant listhesis. There is a small amount of edema  within the posterior inferior margin of L4 and the superior posterior right  lateral margin of L5 adjacent. There is a low T1 and T2 signal 1 cm defect  within the superior midline L5 endplate which may be a Schmorl's node. Mild to  moderate disc space narrowing at this level. .       There is no other vertebral body edema. Disc desiccation with slight narrowing  L1-2. Cauda equina tapers at L1-2 level.      Partially imaged kidneys show global relative mild to moderate atrophy of the  right kidney compared to the left, partially included. There are anterior and  posterior left parapelvic renal cysts, with a 6 cm lobulated posterior cortical  partially exophytic cyst. Abdominal CT from 2/2006 is not available in PACS for  comparison. Cheral Sp  Correlation with axial images shows:      L1-2:  Mild diffuse disc bulge. No significant canal narrowing. There is mild  bilateral foraminal encroachment.      L2-3:  Posterior ligamentous thickening up to 5 mm. Minimal diffuse disc bulge. Central canal slightly narrowed to 10.8 mm. No significant foraminal stenosis.      L3-4:  Mild facet degenerative change with posterior ligamentous thickening up  to 5.5 mm. There is mild diffuse disc bulge. Central canal is mildly to  moderately narrowed down to 8.5 mm. There is a component of posterior epidural  lipomatosis. Mild lateral recess narrowing and mild bilateral foraminal  encroachment.      L4-5:  Fairly severe bilateral degenerative facet hypertrophy. There is  posterior ligamentous thickening up to 7 mm. There is a diffuse disc bulge at  this level which contacts and flattens the thecal sac from anterior. Central  canal is significantly narrowed down to 6 mm AP diameter.  Moderate to severe  left-sided foraminal stenosis due to facet hypertrophy predominantly but also  disc bulge laterally which appears to contact the caudal surface of the exiting  nerve root. Similar findings on the right.      L5-S1:  Moderate bilateral degenerative facet hypertrophy with mild ligamentous  thickening. There is a broad-based posterior disc bulge or protrusion which  extends 4 mm into the canal. Central canal is mildly narrowed down to 10 mm  subsequent. There is moderate to severe left and right foraminal stenosis due to  facet encroachment and endplate ridging and disc bulge.          IMPRESSION  IMPRESSION:      1.  Fairly severe central canal narrowing and bilateral foraminal stenosis L4-5 on a multifactorial degenerative basis as above. -There is some mild edema within the posterior adjoining endplates at this  level.      2. Broad-based posterior disc bulge or protrusion, with mild canal but fairly  severe bilateral neural foraminal narrowing L5-S1 as above. 11 minutes of face-to-face contact were spent with the patient during today's visit extensively discussing symptoms and treatment plan. All questions were answered. More than half of this visit today was spent on counseling.      Written by Megan Naqvi as dictated by Gardiner Dandy, MD

## 2019-05-08 RX ORDER — CELECOXIB 200 MG/1
CAPSULE ORAL
Qty: 90 CAP | Refills: 0 | Status: SHIPPED | OUTPATIENT
Start: 2019-05-08 | End: 2019-07-23 | Stop reason: SDUPTHER

## 2019-06-07 ENCOUNTER — TELEPHONE (OUTPATIENT)
Dept: ORTHOPEDIC SURGERY | Age: 64
End: 2019-06-07

## 2019-06-07 NOTE — TELEPHONE ENCOUNTER
Please use Key: [id number] to initiate a prior authorization for Gabapentin through Cover My Meds or contact the pharmacy to change the medication.

## 2019-06-12 NOTE — TELEPHONE ENCOUNTER
Elizabeth Montgomery (Key: [id number])     Your information has been submitted to [facility]. To check for an updated outcome later, reopen this PA request from your dashboard. If [facility] has not responded to your request within 24 hours, contact [facility] at [phone]. If you think there may be a problem with your PA request, use our live chat feature at the bottom right.

## 2019-06-18 NOTE — TELEPHONE ENCOUNTER
N/Aon June 12   Your PA request has been closed. Thank you for your ePA request for Gabapentin. The quantity requested does not require prior authorization, as it does not exceed the standard allowance of 3600 mg limit per day. If a quantity greater than this is required, please contact us again. Thank you.  (Message [phone])

## 2019-07-02 ENCOUNTER — TELEPHONE (OUTPATIENT)
Dept: ORTHOPEDIC SURGERY | Age: 64
End: 2019-07-02

## 2019-07-02 DIAGNOSIS — M54.16 LUMBAR RADICULOPATHY: ICD-10-CM

## 2019-07-02 DIAGNOSIS — M48.062 SPINAL STENOSIS OF LUMBAR REGION WITH NEUROGENIC CLAUDICATION: ICD-10-CM

## 2019-07-02 RX ORDER — GABAPENTIN 800 MG/1
800 TABLET ORAL 3 TIMES DAILY
Qty: 90 TAB | Refills: 5 | Status: SHIPPED | OUTPATIENT
Start: 2019-07-02 | End: 2019-09-12 | Stop reason: DRUGHIGH

## 2019-07-02 NOTE — TELEPHONE ENCOUNTER
Patient notified he will be back in town tomorrow to pick it up at [facility] location

## 2019-07-02 NOTE — TELEPHONE ENCOUNTER
Nathanael Castanon called in states that they need a new rx for the patients gabapentin (NEURONTIN) 800 mg tablet as 07/01/19 since it is now no longer a good rx. Nathanael Castanon can be reached at [phone] or fax # [phone].

## 2019-07-23 ENCOUNTER — TELEPHONE (OUTPATIENT)
Dept: ORTHOPEDIC SURGERY | Age: 64
End: 2019-07-23

## 2019-07-23 NOTE — TELEPHONE ENCOUNTER
Please use Key: [id number] to initiate a prior authorization for Gabapentin through Cover My Meds or contact the pharmacy to change the medication. Michelle Booker (Herrera: [id number])     Your information has been submitted to [facility]. To check for an updated outcome later, reopen this PA request from your dashboard. If [facility] has not responded to your request within 24 hours, contact [facility] at [phone]. If you think there may be a problem with your PA request, use our live chat feature at the bottom right.

## 2019-07-24 NOTE — TELEPHONE ENCOUNTER
N/Aon July 23   Your PA request has been closed. Thank you for your ePA request for Gabapentin. The quantity requested does not require prior authorization, as it does not exceed the standard allowance of 3600 mg limit per day. If a quantity greater than this is required, please contact us again. Thank you.  (Message [phone])

## 2019-08-06 ENCOUNTER — OFFICE VISIT (OUTPATIENT)
Dept: ORTHOPEDIC SURGERY | Age: 64
End: 2019-08-06

## 2019-08-06 VITALS
RESPIRATION RATE: 18 BRPM | HEIGHT: 70 IN | SYSTOLIC BLOOD PRESSURE: 149 MMHG | DIASTOLIC BLOOD PRESSURE: 76 MMHG | WEIGHT: 199 LBS | BODY MASS INDEX: 28.49 KG/M2 | TEMPERATURE: 97.2 F | HEART RATE: 64 BPM

## 2019-08-06 DIAGNOSIS — M54.16 LUMBAR RADICULOPATHY: ICD-10-CM

## 2019-08-06 DIAGNOSIS — M79.18 CERVICAL MYOFASCIAL PAIN SYNDROME: ICD-10-CM

## 2019-08-06 DIAGNOSIS — M54.2 CERVICAL PAIN: ICD-10-CM

## 2019-08-06 DIAGNOSIS — M48.062 SPINAL STENOSIS OF LUMBAR REGION WITH NEUROGENIC CLAUDICATION: Primary | ICD-10-CM

## 2019-08-06 NOTE — PROGRESS NOTES
[address]  [address], [address]  [place]  Phone: [phone]  Fax: [phone]        Carolina Morgan  : 1955  PCP: Denia Ascencio MD  2019    PROGRESS NOTE      HISTORY OF PRESENT ILLNESS  Harry Longo is a 61 y.o. male. He was seen as a new patient 3/6/18 with c/o right thigh pain that progressively worsened x 8 months. He described the pain as a right thigh pain that sometimes caused numbness, tingling, or throbbing pain. He reported weakness walking up steps. He noted that his pain improved if he straightened his leg completely. He was previously on Gabapentin and felt like the \"room was spinning. \" He decided to not take Lyrica for this reason, especially since he is caring for and lifting his father-in-law who recently had a stroke. He is recently retired. He worked on submarines in the past. He notes he has had increased pain since his last visit. He has had more pain radiating from his low back radiating into the posterior aspect of his BLE. He also continues to have pain radiating into his bilateral thighs, but the pain in the posterior aspect of his BLE is causing more of his pain today. He has seen some relief using Aleve. He has not seen any relief with the Amitriptyline or the Salonpas. He found significant relief from an L2-3 interlaminar injection (18; Dr. Shalini Franks). He notes once he tried the Gabapentin for longer than 2 days, he was able to tolerate the medication well. He is currently taking Gabapentin 300mg BID, and he is tolerating it well. He is interested in increasing it to TID. He found 100% relief from the injection (L2-3 interlaminar) for 48 hours, however, he continues to feel about 95% relief. The residual pain is managed well with Gabapentin. He also c/o a newer neck pain and decreased ROM to the L.  He previously had an arthroscopic surgery on his L shoulder, but he continues to have some residual pain. He has found significant relief from Gabapentin 600mg TID, but if he does not take them on time, he can feel a return of his pain. Jovita Moss comes in to the office today for f/u. He notes that the Gabapentin 800 mg TID appears to be taking longer to kick in now, so he has considered taking it QID. He sometimes wakes up in the middle of the night due to pain and will need to take one. He has been unable to sleep on his stomach or back due to pain; he can only sleep on his side. His pain is worse when he is relaxing/not active. He rates his pain as a 0/10 today. ASSESSMENT  His residual pain likely remains due to lumbar spinal stenosis vs lumbar radiculopathy. We discussed options of changing/adding neuromodulators vs lumbar GHAZALA vs surgical consult. PLAN  1. L2-3 interlaminar injection. Pt will f/u in 2 weeks after injection or sooner as needed. Diagnoses and all orders for this visit:    1.  Spinal stenosis of lumbar region with neurogenic claudication  -     SCHEDULE SURGERY    2. Lumbar radiculopathy  -     SCHEDULE SURGERY    3. Cervical pain    4. 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 Lai Na liver 11/00    Fib-4 was 0.54 from 10/14; US 2/18 showed fatty liver and hepatomegaly    GERD (gastroesophageal reflux disease)     and HH on UGI 11/00    Hip bursitis     Dr Carey Hammer left 2015    Hyperlipidemia     calculated 10 yr risk score 15.2% (2/19)    Prediabetes 2011     Renal cysts, acquired, bilateral 02/2018    Us showed 1.4cm cyst right, 7 cm and 2.5 cm cysts on left    Rhinophyma     S/P cardiac cath     nl, EF 60% Dr. Dwight Prajapati Umbilical hernia        Past Surgical History:   Procedure Laterality Date    HX COLONOSCOPY      Dr. Aliza Duncan 10/10 diverticulosis    HX HERNIA REPAIR      HX ORTHOPAEDIC  2012    Dr. Cecelia Ozuna left distal clavicle excision and acromioplasty   . MEDICATIONS    Current Outpatient Medications   Medication Sig Dispense Refill    celecoxib (CELEBREX) 200 mg capsule TAKE ONE CAPSULE BY MOUTH DAILY 90 Cap 3    amLODIPine (NORVASC) 5 mg tablet TAKE ONE TABLET BY MOUTH DAILY 90 Tab 3    gabapentin (NEURONTIN) 800 mg tablet Take 1 Tab by mouth three (3) times daily. Indications: Neuropathic Pain 90 Tab 5    Omega-3 Fatty Acids (FISH OIL) 500 mg cap Take 500 mg by mouth two (2) times a day. 60 Cap 0    zinc-colostrum-[id number] 15 mg-50 mg- 1 mg-1 mg cap Take  by mouth.  omeprazole (PRILOSEC) 40 mg capsule TAKE ONE CAPSULE BY MOUTH DAILY 90 Cap 3    acetaminophen (TYLENOL EXTRA STRENGTH) 500 mg tablet Take 1,000 mg by mouth every six (6) hours as needed for Pain.  cholecalciferol (VITAMIN D3) 1,000 unit tablet Take 2,500 Units by mouth daily.  ascorbic acid (VITAMIN C) 500 mg tablet Take  by mouth.  multivitamin (ONE A DAY) tablet Take 1 Tab by mouth daily.  cyanocobalamin 1,000 mcg tablet Take 1,000 mcg by mouth daily.           ALLERGIES  Allergies   Allergen Reactions    Cymbalta [Duloxetine] Other (comments)     [place] funny          SOCIAL HISTORY    Social History     Socioeconomic History    Marital status:      Spouse name: Not on file    Number of children: 3    Years of education: Not on file    Highest education level: Not on file   Occupational History    Occupation: mgr at [employer]   Tobacco Use    Smoking status: Former Smoker     Last attempt to quit: 2005     Years since quittin.6    Smokeless tobacco: Never Used    Tobacco comment: 60+ py   Substance and Sexual Activity    Alcohol use: No    Drug use: No       FAMILY HISTORY  Family History   Problem Relation Age of Onset    Other Father         cardiomyopathy    Heart Disease Brother          REVIEW OF SYSTEMS  Review of Systems   Musculoskeletal: Positive for back pain and neck pain. BLE pain          PHYSICAL EXAMINATION  Visit Vitals  /76   Pulse 64   Temp 97.2 °F (36.2 °C) (Oral)   Resp 18   Ht 5' 10\" (1.778 m)   Wt 199 lb (90.3 kg)   BMI 28.55 kg/m²       Pain Assessment  3/28/2019   Location of Pain Knee   Location Modifiers Right   Severity of Pain 7   Quality of Pain Aching; Throbbing   Quality of Pain Comment -   Duration of Pain Persistent   Frequency of Pain Constant   Aggravating Factors Walking;Bending;Standing   Aggravating Factors Comment -   Limiting Behavior Yes   Relieving Factors Ice;Rest;Heat;Elevation   Relieving Factors Comment -   Result of Injury No           Constitutional:  Well developed, well nourished, in no acute distress. Psychiatric: Affect and mood are appropriate. Integumentary: No rashes or abrasions noted on exposed areas. SPINE/MUSCULOSKELETAL EXAM    Cervical spine:  Neck is midline. Normal muscle tone. No focal atrophy is noted. ROM pain free. Shoulder ROM intact. No tenderness to palpation. Negative Spurling's sign. Negative Tinel's sign. Negative Tovar's sign.       Sensation in the bilateral arms grossly intact to light touch.       Lumbar spine:  No rash, ecchymosis, or gross obliquity. No fasciculations. No focal atrophy is noted. No pain with hip ROM. Full range of motion. No tenderness to palpation. No tenderness to palpation at the sciatic notch. SI joints non-tender.    Trochanters non tender.      Decreased sensation L1/L2 on the right side.     Updates from 2/7/19:  Tenderness to palpation of scalenes and trapezius on the L  Decreased neck ROM to the L  Decreased ROM of L shoulder    MOTOR:      Biceps  Triceps Deltoids Wrist Ext Wrist Flex Hand Intrin Right 5/5 5/5 5/5 5/5 5/5 5/5   Left 5/5 5/5 5/5 5/5 5/5 5/5             Hip Flex  Quads Hamstrings Ankle DF EHL Ankle PF   Right 5/5 5/5 5/5 5/5 5/5 5/5   Left 5/5 5/5 5/5 5/5 5/5 5/5     DTRs are 2+ biceps, triceps, brachioradialis, patella, and Achilles.      Negative Straight Leg raise. Squat not tested. No difficulty with tandem gait.       Ambulation without assistive device. FWB.       RADIOGRAPHS  Lumbar MRI images taken on 2/20/18 personally reviewed with patient:  FINDINGS:       Sagittal images show slightly straightened lordosis. There is no vertebral body  compression deformity or significant listhesis. There is a small amount of edema  within the posterior inferior margin of L4 and the superior posterior right  lateral margin of L5 adjacent. There is a low T1 and T2 signal 1 cm defect  within the superior midline L5 endplate which may be a Schmorl's node. Mild to  moderate disc space narrowing at this level. .       There is no other vertebral body edema. Disc desiccation with slight narrowing  L1-2. Cauda equina tapers at L1-2 level.      Partially imaged kidneys show global relative mild to moderate atrophy of the  right kidney compared to the left, partially included. There are anterior and  posterior left parapelvic renal cysts, with a 6 cm lobulated posterior cortical  partially exophytic cyst. Abdominal CT from 2/2006 is not available in PACS for  comparison. Tay Swain  Correlation with axial images shows:      L1-2:  Mild diffuse disc bulge. No significant canal narrowing. There is mild  bilateral foraminal encroachment.      L2-3:  Posterior ligamentous thickening up to 5 mm. Minimal diffuse disc bulge. Central canal slightly narrowed to 10.8 mm. No significant foraminal stenosis.      L3-4:  Mild facet degenerative change with posterior ligamentous thickening up  to 5.5 mm. There is mild diffuse disc bulge. Central canal is mildly to  moderately narrowed down to 8.5 mm.  There is a component of posterior epidural  lipomatosis. Mild lateral recess narrowing and mild bilateral foraminal  encroachment.      L4-5:  Fairly severe bilateral degenerative facet hypertrophy. There is  posterior ligamentous thickening up to 7 mm. There is a diffuse disc bulge at  this level which contacts and flattens the thecal sac from anterior. Central  canal is significantly narrowed down to 6 mm AP diameter. Moderate to severe  left-sided foraminal stenosis due to facet hypertrophy predominantly but also  disc bulge laterally which appears to contact the caudal surface of the exiting  nerve root. Similar findings on the right.      L5-S1:  Moderate bilateral degenerative facet hypertrophy with mild ligamentous  thickening. There is a broad-based posterior disc bulge or protrusion which  extends 4 mm into the canal. Central canal is mildly narrowed down to 10 mm  subsequent. There is moderate to severe left and right foraminal stenosis due to  facet encroachment and endplate ridging and disc bulge.          IMPRESSION  IMPRESSION:      1.  Fairly severe central canal narrowing and bilateral foraminal stenosis L4-5 on a multifactorial degenerative basis as above. -There is some mild edema within the posterior adjoining endplates at this  level.      2. Broad-based posterior disc bulge or protrusion, with mild canal but fairly  severe bilateral neural foraminal narrowing L5-S1 as above. 8 minutes of face-to-face contact were spent with the patient during today's visit extensively discussing symptoms and treatment plan. All questions were answered. More than half of this visit today was spent on counseling.      Written by Lisa Richardson as dictated by Mirza Basurto MD

## 2019-08-22 ENCOUNTER — HOSPITAL ENCOUNTER (OUTPATIENT)
Age: 64
Setting detail: OUTPATIENT SURGERY
Discharge: HOME | End: 2019-08-22
Attending: PHYSICAL MEDICINE & REHABILITATION | Admitting: PHYSICAL MEDICINE & REHABILITATION
Payer: COMMERCIAL

## 2019-08-22 ENCOUNTER — APPOINTMENT (OUTPATIENT)
Dept: GENERAL RADIOLOGY | Age: 64
End: 2019-08-22
Attending: PHYSICAL MEDICINE & REHABILITATION
Payer: COMMERCIAL

## 2019-08-22 VITALS
OXYGEN SATURATION: 90 % | TEMPERATURE: 97.8 F | BODY MASS INDEX: 28.49 KG/M2 | RESPIRATION RATE: 22 BRPM | SYSTOLIC BLOOD PRESSURE: 143 MMHG | WEIGHT: 199 LBS | DIASTOLIC BLOOD PRESSURE: 80 MMHG | HEIGHT: 70 IN | HEART RATE: 65 BPM

## 2019-08-22 PROCEDURE — 76010000009 HC PAIN MGT 0 TO 30 MIN PROC: Performed by: PHYSICAL MEDICINE & REHABILITATION

## 2019-08-22 PROCEDURE — 77030014124 HC TY EPDRL BBMI -A: Performed by: PHYSICAL MEDICINE & REHABILITATION

## 2019-08-22 PROCEDURE — 74011250636 HC RX REV CODE- 250/636: Performed by: PHYSICAL MEDICINE & REHABILITATION

## 2019-08-22 RX ORDER — LIDOCAINE HYDROCHLORIDE 10 MG/ML
INJECTION, SOLUTION EPIDURAL; INFILTRATION; INTRACAUDAL; PERINEURAL AS NEEDED
Status: DISCONTINUED | OUTPATIENT
Start: 2019-08-22 | End: 2019-08-22 | Stop reason: HOSPADM

## 2019-08-22 RX ORDER — DIAZEPAM 5 MG/1
5-20 TABLET ORAL ONCE
Status: DISCONTINUED | OUTPATIENT
Start: 2019-08-22 | End: 2019-08-22 | Stop reason: HOSPADM

## 2019-08-22 RX ORDER — DEXAMETHASONE SODIUM PHOSPHATE 100 MG/10ML
INJECTION INTRAMUSCULAR; INTRAVENOUS AS NEEDED
Status: DISCONTINUED | OUTPATIENT
Start: 2019-08-22 | End: 2019-08-22 | Stop reason: HOSPADM

## 2019-08-22 NOTE — DISCHARGE INSTRUCTIONS
[facility]   (MARGARET)  Dr. Mateo Ross, Dr. [facility], Dr. Vernon De not drive a car, operate heavy machinery or dangerous equipment for 24 hours. * Activity as tolerated; rest for the remainder of the day. * Resume pre-block medications including those for your family doctor. * Do not drink alcoholic beverages for 24 hours. Alcohol and the medications you have received may interact and cause an adverse reaction. * You may feel better this evening and worse tomorrow, as the numbing medications wears off and the steroid has yet to begin to work. After 48 hrs the steroid should begin to release bringing you relief. * You may shower this evening and remove any bandages. * Avoid hot tubs and heating pads for 24 hours. You may use cold packs on the procedure site as tolerated for the first 24 hours. * If a headache develops, drink plenty of fluids and rest.  Take over the counter medications for headache if needed. If the headache continues longer than 24 hours, call MD at the [address] [place] [address]. [phone]    * Continue taking pain medications as needed. * You may resume your regular diet if tolerated. Otherwise, start with sips of water and advance slowly. * If Diabetic: check your blood sugar three times a day for the next 3 days. If your sugar is greater than 300 call your family doctor. If your sugar is greater than 400, have someone transport you to the nearest Emergency Room. * If you experience any of the following problems, Please Call the [address] [place] [address] at [phone].         * Shortness of Breath    * Fever of 101 or higher    * Nausea / Vomiting    * Severe Headache    * Weakness or numbness in arms or legs that is not      resolving    * Prolonged increase in pain greater than 4 days      DISCHARGE SUMMARY from Nurse      PATIENT INSTRUCTIONS:    After oral sedation, for 24 hours or while taking prescription Narcotics:  · Limit your activities  · Do not drive and operate hazardous machinery  · Do not make important personal or business decisions  · Do  not drink alcoholic beverages  · If you have not urinated within 8 hours after discharge, please contact your surgeon on call. Report the following to your surgeon:  · Excessive pain, swelling, redness or odor of or around the surgical area  · Temperature over 101  · Nausea and vomiting lasting longer than 4 hours or if unable to take medications  · Any signs of decreased circulation or nerve impairment to extremity: change in color, persistent  numbness, tingling, coldness or increase pain  · Any questions            What to do at Home:  Recommended activity: Activity as tolerated, NO DRIVING FOR 12 Hours post injection          *  Please give a list of your current medications to your Primary Care Provider. *  Please update this list whenever your medications are discontinued, doses are      changed, or new medications (including over-the-counter products) are added. *  Please carry medication information at all times in case of emergency situations. These are general instructions for a healthy lifestyle:    No smoking/ No tobacco products/ Avoid exposure to second hand smoke    Surgeon General's Warning:  Quitting smoking now greatly reduces serious risk to your health. Obesity, smoking, and sedentary lifestyle greatly increases your risk for illness    A healthy diet, regular physical exercise & weight monitoring are important for maintaining a healthy lifestyle    You may be retaining fluid if you have a history of heart failure or if you experience any of the following symptoms:  Weight gain of 3 pounds or more overnight or 5 pounds in a week, increased swelling in our hands or feet or shortness of breath while lying flat in bed.   Please call your doctor as soon as you notice any of these symptoms; do not wait until your next office visit. Recognize signs and symptoms of STROKE:    F-face looks uneven    A-arms unable to move or move unevenly    S-speech slurred or non-existent    T-time-call 911 as soon as signs and symptoms begin-DO NOT go       Back to bed or wait to see if you get better-TIME IS BRAIN.

## 2019-08-22 NOTE — PROCEDURES
Intralaminar Epidural Steroid Block Procedure Note      Patient Name: Jeremi Hodge    Date of Procedure: August 22, 2019    Preoperative Diagnosis: SPINAL STENOSIS OF LUMBAR REGION WITH NEUROGENIC CLAUDICATION    Post Operative Diagnosis: SPINAL STENOSIS OF LUMBAR REGION WITH NEUROGENIC CLAUDICATION    Procedure: L2-L3 Epidural Block     PROCEDURE:  Lumbar Epidural Block    Consent:  Informed  Consent was obtained prior to the procedure. The patient was given the opportunity to ask questions regarding the procedure and its associated risks. In addition to the potential risks associated with the procedure itself, the patient was informed both verbally and in writing of potential side effects of the use glucocorticoids. The patient appeared to comprehend the informed consent and desired to have the procedure performed. The patient was placed in the prone position on the fluoroscopy table and the back prepped and draped in the usual sterile manner. The interlaminar space was identified using fluoroscopy and the skin anesthetized with 1% Lidocaine. A #18 Tuohy Epidural needle was advanced under fluoroscopic control from a paramedian approach to the  epidural space as noted above, using the loss of resistance to fluid technique. Then, 30 mg of preservative free Dexamethasone and 4 cc of Lidocaine was slowly injected. The patient tolerated the procedure well. The injection area was cleaned and band aids applied. No excessive bleeding was noted. Patient dressed and was discharged to home with instructions.

## 2019-09-12 ENCOUNTER — OFFICE VISIT (OUTPATIENT)
Dept: ORTHOPEDIC SURGERY | Age: 64
End: 2019-09-12

## 2019-09-12 VITALS
HEIGHT: 70 IN | BODY MASS INDEX: 28.63 KG/M2 | RESPIRATION RATE: 18 BRPM | DIASTOLIC BLOOD PRESSURE: 79 MMHG | TEMPERATURE: 97.6 F | HEART RATE: 76 BPM | SYSTOLIC BLOOD PRESSURE: 147 MMHG | WEIGHT: 200 LBS

## 2019-09-12 DIAGNOSIS — M48.062 SPINAL STENOSIS OF LUMBAR REGION WITH NEUROGENIC CLAUDICATION: Primary | ICD-10-CM

## 2019-09-12 DIAGNOSIS — M54.16 LUMBAR RADICULOPATHY: ICD-10-CM

## 2019-09-12 RX ORDER — GABAPENTIN 800 MG/1
800 TABLET ORAL 4 TIMES DAILY
Qty: 120 TAB | Refills: 5 | Status: SHIPPED | OUTPATIENT
Start: 2019-09-12 | End: 2019-10-21 | Stop reason: ALTCHOICE

## 2019-09-12 RX ORDER — AMITRIPTYLINE HYDROCHLORIDE 25 MG/1
75 TABLET, FILM COATED ORAL
Qty: 90 TAB | Refills: 2 | Status: SHIPPED | OUTPATIENT
Start: 2019-09-12 | End: 2021-04-29

## 2019-09-12 NOTE — PATIENT INSTRUCTIONS
Amitriptyline Daily Instructions:  Days 1 to 3: Take one 25 mg pill at night  Days 4 to 6: Take two 25 mg pills at night  After day 6: Take three 25 mg pills at night

## 2019-09-12 NOTE — PROGRESS NOTES
[address]  [address], [address]  [place], [facility] [address]  Phone: [phone]  Fax: [phone]        Eveleen Ahumada  : 1955  PCP: Gopal Paiz MD  2019    PROGRESS NOTE      HISTORY OF PRESENT ILLNESS  Aliza Duarte is a 59 y.o. male. He was seen as a new patient 3/6/18 with c/o right thigh pain that progressively worsened x 8 months. He described the pain as a right thigh pain that sometimes caused numbness, tingling, or throbbing pain. He reported weakness walking up steps. He noted that his pain improved if he straightened his leg completely. He was previously on Gabapentin and felt like the \"room was spinning. \" He decided to not take Lyrica for this reason, especially since he is caring for and lifting his father-in-law who recently had a stroke. He is recently retired. He worked on submarines in the past. He notes he has had increased pain since his last visit. He has had more pain radiating from his low back radiating into the posterior aspect of his BLE. He also continues to have pain radiating into his bilateral thighs, but the pain in the posterior aspect of his BLE is causing more of his pain today. He has seen some relief using Aleve. He has not seen any relief with the Amitriptyline or the [facility] found significant relief from an L2-3 interlaminar injection (18; Dr. Yessi Webb notes once he tried the Gabapentin for longer than 2 days, he was able to tolerate the medication well. He is currently taking Gabapentin 300mg BID, and he is tolerating it well. He is interested in increasing it to TID. He found 100% relief from the injection (L2-3 interlaminar) for 48 hours, however, he continues to feel about 95% relief. The residual pain is managed well with Gabapentin. He also c/o a newer neck pain and decreased ROM to the L.  He previously had an arthroscopic surgery on his L shoulder, but he continues to have some residual pain. He has found significant relief from Gabapentin 600mg TID, but if he does not take them on time, he can feel a return of his pain. He notes that the Gabapentin 800 mg TID appears to be taking longer to kick in now, so he has considered taking it QID. He sometimes wakes up in the middle of the night due to pain and will need to take one. He has been unable to sleep on his stomach or back due to pain; he can only sleep on his side. His pain is worse when he is relaxing/not active. Daniel Davis comes in to the office today for f/u. He had an L2-3 interlaminar injection (8/22/19; Dr. Anne Sethi) with significant relief. He reports that he takes Gabapentin 800 mg QID with significant improvement as well. He rates his pain as a 0/10 today. ASSESSMENT  His residual pain likely remains due to lumbar spinal stenosis vs lumbar radiculopathy. PLAN  1. Gabapentin 800 mg QID. (Return to TID if finding relief with Amitriptyline). 2. Amitriptyline 3 x 25 mg QHS to take with Gabapentin. Pt will f/u in 3 months or sooner as needed. Diagnoses and all orders for this visit:    1. Spinal stenosis of lumbar region with neurogenic claudication  -     gabapentin (NEURONTIN) 800 mg tablet; Take 1 Tab by mouth four (4) times daily. Max Daily Amount: 3,200 mg.  -     amitriptyline (ELAVIL) 25 mg tablet; Take 3 Tabs by mouth nightly. 2. Lumbar radiculopathy  -     gabapentin (NEURONTIN) 800 mg tablet; Take 1 Tab by mouth four (4) times daily. Max Daily Amount: 3,200 mg.  -     amitriptyline (ELAVIL) 25 mg tablet; Take 3 Tabs by mouth nightly.                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 Luis Hawkins liver 11/00    Fib-4 was 0.54 from 10/14; US 2/18 showed fatty liver and hepatomegaly    GERD (gastroesophageal reflux disease)     and HH on UGI 11/00    Hip bursitis     Dr Donato Rocha left 2015    Hyperlipidemia     calculated 10 yr risk score 15.2% (2/19)    Prediabetes 2011     Renal cysts, acquired, bilateral 02/2018    Us showed 1.4cm cyst right, 7 cm and 2.5 cm cysts on left    Rhinophyma     S/P cardiac cath 2000    nl, EF 60% Dr. Jayden Arrieta Umbilical hernia        Past Surgical History:   Procedure Laterality Date    HX COLONOSCOPY      Dr. Daniella Delacruz 10/10 diverticulosis    HX HERNIA REPAIR      HX ORTHOPAEDIC  2012    Dr. Anette Baker left distal clavicle excision and acromioplasty   . MEDICATIONS    Current Outpatient Medications   Medication Sig Dispense Refill    celecoxib (CELEBREX) 200 mg capsule TAKE ONE CAPSULE BY MOUTH DAILY 90 Cap 3    amLODIPine (NORVASC) 5 mg tablet TAKE ONE TABLET BY MOUTH DAILY 90 Tab 3    gabapentin (NEURONTIN) 800 mg tablet Take 1 Tab by mouth three (3) times daily. Indications: Neuropathic Pain 90 Tab 5    Omega-3 Fatty Acids (FISH OIL) 500 mg cap Take 500 mg by mouth two (2) times a day. 60 Cap 0    zinc-colostrum-[id number] 15 mg-50 mg- 1 mg-1 mg cap Take  by mouth.  omeprazole (PRILOSEC) 40 mg capsule TAKE ONE CAPSULE BY MOUTH DAILY 90 Cap 3    acetaminophen (TYLENOL EXTRA STRENGTH) 500 mg tablet Take 1,000 mg by mouth every six (6) hours as needed for Pain.  cholecalciferol (VITAMIN D3) 1,000 unit tablet Take 2,500 Units by mouth daily.  ascorbic acid (VITAMIN C) 500 mg tablet Take  by mouth.  multivitamin (ONE A DAY) tablet Take 1 Tab by mouth daily.  cyanocobalamin 1,000 mcg tablet Take 1,000 mcg by mouth daily.           ALLERGIES  Allergies   Allergen Reactions    Cymbalta [Duloxetine] Other (comments)     Gillett funny          SOCIAL HISTORY    Social History     Socioeconomic History    Marital status:      Spouse name: Not on file    Number of children: 3    Years of education: Not on file    Highest education level: Not on file   Occupational History    Occupation: mgr at [employer]   Tobacco Use    Smoking status: Former Smoker     Last attempt to quit: 2005     Years since quittin.7    Smokeless tobacco: Never Used    Tobacco comment: 60+ py   Substance and Sexual Activity    Alcohol use: No    Drug use: No       FAMILY HISTORY  Family History   Problem Relation Age of Onset    Other Father         cardiomyopathy    Heart Disease Brother          REVIEW OF SYSTEMS  Review of Systems   Musculoskeletal: Positive for back pain and neck pain. BLE pain           PHYSICAL EXAMINATION  Visit Vitals  /79   Pulse 76   Temp 97.6 °F (36.4 °C) (Oral)   Resp 18   Ht 5' 10\" (1.778 m)   Wt 200 lb (90.7 kg)   BMI 28.70 kg/m²       Pain Assessment  3/28/2019   Location of Pain Knee   Location Modifiers Right   Severity of Pain 7   Quality of Pain Aching; Throbbing   Quality of Pain Comment -   Duration of Pain Persistent   Frequency of Pain Constant   Aggravating Factors Walking;Bending;Standing   Aggravating Factors Comment -   Limiting Behavior Yes   Relieving Factors Ice;Rest;Heat;Elevation   Relieving Factors Comment -   Result of Injury No           Constitutional:  Well developed, well nourished, in no acute distress. Psychiatric: Affect and mood are appropriate. Integumentary: No rashes or abrasions noted on exposed areas. SPINE/MUSCULOSKELETAL EXAM    Cervical spine:  Neck is midline. Normal muscle tone. No focal atrophy is noted. ROM pain free. Shoulder ROM intact. No tenderness to palpation. Negative Spurling's sign. Negative Tinel's sign. Negative Tovar's sign.       Sensation in the bilateral arms grossly intact to light touch.       Lumbar spine:  No rash, ecchymosis, or gross obliquity. No fasciculations. No focal atrophy is noted.    No pain with hip ROM. Full range of motion. No tenderness to palpation. No tenderness to palpation at the sciatic notch. SI joints non-tender. Trochanters non tender.      Decreased sensation L1/L2 on the right side.     Updates from 2/7/19:  Tenderness to palpation of scalenes and trapezius on the L  Decreased neck ROM to the L  Decreased ROM of L shoulder    MOTOR:      Biceps  Triceps Deltoids Wrist Ext Wrist Flex Hand Intrin   Right 5/5 5/5 5/5 5/5 5/5 5/5   Left 5/5 5/5 5/5 5/5 5/5 5/5             Hip Flex  Quads Hamstrings Ankle DF EHL Ankle PF   Right 5/5 5/5 5/5 5/5 5/5 5/5   Left 5/5 5/5 5/5 5/5 5/5 5/5     DTRs are 2+ biceps, triceps, brachioradialis, patella, and Achilles.      Negative Straight Leg raise. Squat not tested. No difficulty with tandem gait.       Ambulation without assistive device. FWB.       RADIOGRAPHS  Lumbar MRI images taken on 2/20/18 personally reviewed with patient:  FINDINGS:       Sagittal images show slightly straightened lordosis. There is no vertebral body  compression deformity or significant listhesis. There is a small amount of edema  within the posterior inferior margin of L4 and the superior posterior right  lateral margin of L5 adjacent. There is a low T1 and T2 signal 1 cm defect  within the superior midline L5 endplate which may be a Schmorl's node. Mild to  moderate disc space narrowing at this level. .       There is no other vertebral body edema. Disc desiccation with slight narrowing  L1-2. Cauda equina tapers at L1-2 level.      Partially imaged kidneys show global relative mild to moderate atrophy of the  right kidney compared to the left, partially included. There are anterior and  posterior left parapelvic renal cysts, with a 6 cm lobulated posterior cortical  partially exophytic cyst. Abdominal CT from 2/2006 is not available in PACS for  comparison. Rico Baires  Correlation with axial images shows:      L1-2:  Mild diffuse disc bulge.  No significant canal narrowing. There is mild  bilateral foraminal encroachment.      L2-3:  Posterior ligamentous thickening up to 5 mm. Minimal diffuse disc bulge. Central canal slightly narrowed to 10.8 mm. No significant foraminal stenosis.      L3-4:  Mild facet degenerative change with posterior ligamentous thickening up  to 5.5 mm. There is mild diffuse disc bulge. Central canal is mildly to  moderately narrowed down to 8.5 mm. There is a component of posterior epidural  lipomatosis. Mild lateral recess narrowing and mild bilateral foraminal  encroachment.      L4-5:  Fairly severe bilateral degenerative facet hypertrophy. There is  posterior ligamentous thickening up to 7 mm. There is a diffuse disc bulge at  this level which contacts and flattens the thecal sac from anterior. Central  canal is significantly narrowed down to 6 mm AP diameter. Moderate to severe  left-sided foraminal stenosis due to facet hypertrophy predominantly but also  disc bulge laterally which appears to contact the caudal surface of the exiting  nerve root. Similar findings on the right.      L5-S1:  Moderate bilateral degenerative facet hypertrophy with mild ligamentous  thickening. There is a broad-based posterior disc bulge or protrusion which  extends 4 mm into the canal. Central canal is mildly narrowed down to 10 mm  subsequent. There is moderate to severe left and right foraminal stenosis due to  facet encroachment and endplate ridging and disc bulge.          IMPRESSION  IMPRESSION:      1.  Fairly severe central canal narrowing and bilateral foraminal stenosis L4-5 on a multifactorial degenerative basis as above. -There is some mild edema within the posterior adjoining endplates at this  level.      2. Broad-based posterior disc bulge or protrusion, with mild canal but fairly  severe bilateral neural foraminal narrowing L5-S1 as above.     6 minutes of face-to-face contact were spent with the patient during today's visit extensively discussing symptoms and treatment plan. All questions were answered. More than half of this visit today was spent on counseling.      Written by Melani Victor as dictated by Dayami Cartwright MD

## 2019-10-01 ENCOUNTER — TELEPHONE (OUTPATIENT)
Dept: ORTHOPEDIC SURGERY | Age: 64
End: 2019-10-01

## 2019-10-01 DIAGNOSIS — M54.16 LUMBAR RADICULOPATHY: ICD-10-CM

## 2019-10-01 DIAGNOSIS — M48.062 SPINAL STENOSIS OF LUMBAR REGION WITH NEUROGENIC CLAUDICATION: Primary | ICD-10-CM

## 2019-10-01 NOTE — TELEPHONE ENCOUNTER
Patient called for Karel Johns. Patient said that he is going to be out of the Gabapentin medication this Thursday  10/3/19. That Karel Johns told him he could take the medication as needed; so he has been taking 1 1/2 pills every four hours. Patient said that Karel Johns had also prescribed him some medication for sleeping, but those pills have been making him Drowz so he has stopped taking them. Patient is requesting a new medication. That he can  from the [facility] location. Patient is requesting we call his wife  Kaylen Dillon , due to he works construction and can not hear the phone. But his wife would be able to get in touch with him. Kaylen Dillon (on [facility] ) tel. [phone].

## 2019-10-02 NOTE — TELEPHONE ENCOUNTER
Per MD patient is not responding to the tx so a MRI is recommended if he would like to proceed to sx consult with Dr. uRi Schaefer. Patient wife was informed about the MRI and was also given the option for a EMG. She will speak with her  and then call back.

## 2019-10-03 DIAGNOSIS — K21.9 GERD WITHOUT ESOPHAGITIS: ICD-10-CM

## 2019-10-04 RX ORDER — OMEPRAZOLE 40 MG/1
CAPSULE, DELAYED RELEASE ORAL
Qty: 90 CAP | Refills: 2 | Status: SHIPPED | OUTPATIENT
Start: 2019-10-04 | End: 2020-05-28 | Stop reason: SDUPTHER

## 2019-10-04 NOTE — TELEPHONE ENCOUNTER
Patient's wife called back stating that the patient does want to proceed with an MRI. She asked if the order can be put in for [facility]. Please advise Corazon at [phone] when completed so she can get it scheduled.

## 2019-10-04 NOTE — TELEPHONE ENCOUNTER
MRI ordered wife notified to call office to schedule mri f/u, she was also given the information for scheduling the MRI.

## 2019-10-08 ENCOUNTER — HOSPITAL ENCOUNTER (OUTPATIENT)
Age: 64
Discharge: HOME | End: 2019-10-08
Attending: PHYSICAL MEDICINE & REHABILITATION
Payer: COMMERCIAL

## 2019-10-08 DIAGNOSIS — M54.16 LUMBAR RADICULOPATHY: ICD-10-CM

## 2019-10-08 DIAGNOSIS — M48.062 SPINAL STENOSIS OF LUMBAR REGION WITH NEUROGENIC CLAUDICATION: ICD-10-CM

## 2019-10-08 PROCEDURE — 72148 MRI LUMBAR SPINE W/O DYE: CPT

## 2019-10-21 ENCOUNTER — OFFICE VISIT (OUTPATIENT)
Dept: ORTHOPEDIC SURGERY | Age: 64
End: 2019-10-21

## 2019-10-21 VITALS
SYSTOLIC BLOOD PRESSURE: 138 MMHG | RESPIRATION RATE: 18 BRPM | HEIGHT: 70 IN | WEIGHT: 199 LBS | BODY MASS INDEX: 28.49 KG/M2 | TEMPERATURE: 97.7 F | DIASTOLIC BLOOD PRESSURE: 77 MMHG | HEART RATE: 52 BPM

## 2019-10-21 DIAGNOSIS — M48.062 SPINAL STENOSIS OF LUMBAR REGION WITH NEUROGENIC CLAUDICATION: Primary | ICD-10-CM

## 2019-10-21 RX ORDER — PREGABALIN 225 MG/1
225 CAPSULE ORAL 2 TIMES DAILY
Qty: 60 CAP | Refills: 2 | Status: SHIPPED | OUTPATIENT
Start: 2019-10-21 | End: 2021-04-29

## 2019-10-21 NOTE — PROGRESS NOTES
[address]  [address], [address]  [place], [address] [place] [address] Street  Phone: [phone]  Fax: [phone]        Nazario Marrow  : 1955  PCP: Mauri Dewitt MD  10/21/2019    PROGRESS NOTE      HISTORY OF PRESENT ILLNESS  Konrad Noonan is a 59 y.o. male. He was seen as a new patient 3/6/18 with c/o right thigh pain that progressively worsened x 8 months. He described the pain as a right thigh pain that sometimes caused numbness, tingling, or throbbing pain. He reported weakness walking up steps. He noted that his pain improved if he straightened his leg completely. He was previously on Gabapentin and felt like the \"room was spinning. \" He decided to not take Lyrica for this reason, especially since he is caring for and lifting his father-in-law who recently had a stroke. He is recently retired. He worked on submarines in the past. He notes he has had increased pain since his last visit. He has had more pain radiating from his low back radiating into the posterior aspect of his BLE. He also continues to have pain radiating into his bilateral thighs, but the pain in the posterior aspect of his BLE is causing more of his pain today. He has seen some relief using Aleve. He has not seen any relief with the Amitriptyline or the Salonpas. He found significant relief from an L2-3 interlaminar injection (18; Dr. Abhishek Hopkins notes once he tried the Gabapentin for longer than 2 days, he was able to tolerate the medication well. He is currently taking Gabapentin 300mg BID, and he is tolerating it well. He is interested in increasing it to TID. He found 100% relief from the injection (L2-3 interlaminar) for 48 hours, however, he continues to feel about 95% relief. The residual pain is managed well with Gabapentin. He also c/o a newer neck pain and decreased ROM to the L.  He previously had an arthroscopic surgery on his L shoulder, but he continues to have some residual pain. He has found significant relief from Gabapentin 600mg TID, but if he does not take them on time, he can feel a return of his pain. He notes that the Gabapentin 800 mg TID appears to be taking longer to kick in now, so he has considered taking it QID. He sometimes wakes up in the middle of the night due to pain and will need to take one. He has been unable to sleep on his stomach or back due to pain; he can only sleep on his side. His pain is worse when he is relaxing/not active. He had an L2-3 interlaminar injection (8/22/19; Dr. Vaughn Solis) with significant relief. He reports that he takes Gabapentin 800 mg QID with significant improvement as well. He was prescribed Amitriptyline 3 x 25 mg QHS to take with the Gabapentin. Jayla Yusuf comes in to the office today for f/u. Pt called 10/1/19 notifying that he did not tolerate Amitriptyline as it caused drowsiness, and he had been taking 1.5 Gabapentin (1200 mg) every 4 hours without benefit. He has also had to take Tylenol \"to take the edge off. \" He c/o low back pain and buttock pain radiating into the posterior aspect of the BLE. He notes his symptoms are worse with walking/standing, and they are relieved with sitting. His wife notes that his balance has been affected. Lumbar MRI 10/8/19:  Interval progressive degenerative changes at L4/L5. Multifactorial causes for severe spinal canal and moderate to severe bilateral foraminal stenosis. Mixed Modic degenerative endplate changes at Y7/Q3. Moderate to severe right and moderate left L4/L5 facet arthrosis with right sided capsular hypertrophy with synovitis and a small posteriorly oriented synovial cyst. Findings likely reflect active osteoarthritis; septic arthritis is a less likely differential consideration but not excluded. Suggest correlation with ESR and CRP. Similar degree of multifactorial changes at L5/S1, with associated severe bilateral foraminal stenosis.  Additional lower grade degenerative changes at multiple levels, similar to prior. Please see above for additional details. He rates his pain as a 6/10 today. ASSESSMENT  His bilateral leg symptoms are likely due to severe spinal stenosis at L4-5 with neurogenic claudication due to disc bulge, ligamentum flavum hypertrophy, and facet arthropathy. He has failed conservative treatment options including, medications, physical therapy, and multiple spine injections. PLAN  1. Referral to Dr. Anderson Velez for surgical consult. 2. Lyrica 225 mg BID. Pt will f/u with Dr. Anderson Velez for surgical consult or sooner as needed. Diagnoses and all orders for this visit:    1. Spinal stenosis of lumbar region with neurogenic claudication  -     pregabalin (LYRICA) 225 mg capsule; Take 1 Cap by mouth two (2) times a day. Max Daily Amount: 450 mg. 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 Hamlet Vega liver 11/00    Fib-4 was 0.54 from 10/14; US 2/18 showed fatty liver and hepatomegaly    GERD (gastroesophageal reflux disease)     and HH on UGI 11/00    Hip bursitis     Dr Nahed Hreman left 2015    Hyperlipidemia     calculated 10 yr risk score 15.2% (2/19)    Prediabetes 2011     Renal cysts, acquired, bilateral 02/2018    Us showed 1.4cm cyst right, 7 cm and 2.5 cm cysts on left    Rhinophyma     S/P cardiac cath 2000    nl, EF 60% Dr. Johnathan Casas Umbilical hernia        Past Surgical History:   Procedure Laterality Date    HX COLONOSCOPY      Dr. Gio Nick 10/10 diverticulosis    HX HERNIA REPAIR      HX ORTHOPAEDIC  2012    Dr. Callum Coronado left distal clavicle excision and acromioplasty   .       MEDICATIONS    Current Outpatient Medications   Medication Sig Dispense Refill    omeprazole (PRILOSEC) 40 mg capsule TAKE ONE CAPSULE BY MOUTH DAILY 90 Cap 2    gabapentin (NEURONTIN) 800 mg tablet Take 1 Tab by mouth four (4) times daily. Max Daily Amount: 3,200 mg. 120 Tab 5    amitriptyline (ELAVIL) 25 mg tablet Take 3 Tabs by mouth nightly. 90 Tab 2    celecoxib (CELEBREX) 200 mg capsule TAKE ONE CAPSULE BY MOUTH DAILY 90 Cap 3    amLODIPine (NORVASC) 5 mg tablet TAKE ONE TABLET BY MOUTH DAILY 90 Tab 3    Omega-3 Fatty Acids (FISH OIL) 500 mg cap Take 500 mg by mouth two (2) times a day. 60 Cap 0    zinc-[id number] 15 mg-50 mg- 1 mg-1 mg cap Take  by mouth.  acetaminophen (TYLENOL EXTRA STRENGTH) 500 mg tablet Take 1,000 mg by mouth every six (6) hours as needed for Pain.  cholecalciferol (VITAMIN D3) 1,000 unit tablet Take 2,500 Units by mouth daily.  ascorbic acid (VITAMIN C) 500 mg tablet Take  by mouth.  multivitamin (ONE A DAY) tablet Take 1 Tab by mouth daily.  cyanocobalamin 1,000 mcg tablet Take 1,000 mcg by mouth daily. ALLERGIES  Allergies   Allergen Reactions    Cymbalta [Duloxetine] Other (comments)     Altura funny          SOCIAL HISTORY    Social History     Socioeconomic History    Marital status:      Spouse name: Not on file    Number of children: 3    Years of education: Not on file    Highest education level: Not on file   Occupational History    Occupation: mgr at [employer]   Tobacco Use    Smoking status: Former Smoker     Last attempt to quit: 2005     Years since quittin.8    Smokeless tobacco: Never Used    Tobacco comment: 60+ py   Substance and Sexual Activity    Alcohol use: No    Drug use: No       FAMILY HISTORY  Family History   Problem Relation Age of Onset    Other Father         cardiomyopathy    Heart Disease Brother          REVIEW OF SYSTEMS  Review of Systems   Musculoskeletal: Positive for back pain. Bilateral buttock and BLE pain/paraesthesia          PHYSICAL EXAMINATION  Visit Vitals  /77   Pulse (!) 52   Temp 97.7 °F (36.5 °C) (Oral)   Resp 18   Ht 5' 10\" (1.778 m)   Wt 199 lb (90.3 kg)   BMI 28.55 kg/m²       Pain Assessment  10/21/2019   Location of Pain Back   Location Modifiers Left;Right   Severity of Pain 6   Quality of Pain Sharp   Quality of Pain Comment numbness to r leg   Duration of Pain Persistent   Frequency of Pain Constant   Aggravating Factors -   Aggravating Factors Comment -   Limiting Behavior -   Relieving Factors -   Relieving Factors Comment -   Result of Injury -           Constitutional:  Well developed, well nourished, in no acute distress. Psychiatric: Affect and mood are appropriate. Integumentary: No rashes or abrasions noted on exposed areas. SPINE/MUSCULOSKELETAL EXAM    Cervical spine:  Neck is midline. Normal muscle tone. No focal atrophy is noted. ROM pain free. Shoulder ROM intact. No tenderness to palpation. Negative Spurling's sign. Negative Tinel's sign. Negative Tovar's sign.       Sensation in the bilateral arms grossly intact to light touch.       Lumbar spine:  No rash, ecchymosis, or gross obliquity. No fasciculations. No focal atrophy is noted. No pain with hip ROM. Full range of motion. No tenderness to palpation. No tenderness to palpation at the sciatic notch. SI joints non-tender.    Trochanters non tender.      Decreased sensation L1/L2 on the right side.     Updates from 2/7/19:  Tenderness to palpation of scalenes and trapezius on the L  Decreased neck ROM to the L  Decreased ROM of L shoulder    MOTOR:      Biceps  Triceps Deltoids Wrist Ext Wrist Flex Hand Intrin   Right 5/5 5/5 5/5 5/5 5/5 5/5   Left 5/5 5/5 5/5 5/5 5/5 5/5             Hip Flex  Quads Hamstrings Ankle DF EHL Ankle PF   Right 5/5 5/5 5/5 5/5 5/5 5/5   Left 5/5 5/5 5/5 5/5 5/5 5/5     DTRs are 2+ biceps, triceps, brachioradialis, patella, and Achilles.      Negative Straight Leg raise. Squat not tested. No difficulty with tandem gait.       Ambulation without assistive device. FWB.       RADIOGRAPHS  Lumbar MRI images taken on 10/8/19 personally reviewed with patient:  There are 5 lumbar type vertebral bodies. The disc space at axial T2 image 8  will be referred to as L5/S1 for the purposes of this dictation.     There is straightening the lumbar lordosis. There is trace retrolisthesis of L1  on L2. Lumbar vertebral body heights are preserved. There is no acute fracture. There are Schmorl's nodes at multiple levels, most pronounced at the L5 superior  endplate. There are mixed Modic degenerative endplate changes at V2/Y4.     There is moderate synovitis about the right L4/L5 facet joint. No evidence of  diffuse marrow infiltrative process. Mild edema noted at the L3 anterior  superior endplate and L1 posterior inferior endplate     The conus medullaris terminates at L1/L2 and is normal in signal and morphology.     There is a 5.6 cm left renal cyst. There are several additional peripelvic T2  hyperintense structures in the left kidney, most likely reflecting parapelvic  cysts, similar in appearance to prior. There is mild sigmoid diverticulosis. No  evidence of diverticulitis. Mild bilateral sacroiliac joint marginal  osteophytosis. Small subcortical cyst noted at the iliac margin of the posterior  synovial portion of the left sacroiliac joint.     Limited assessment of the visualized soft tissues is otherwise unremarkable.     Correlation of axial and sagittal images:     T12-L1: No significant disc pathology. Flavum hypertrophy. No significant spinal  canal or neural foraminal stenosis.     L1-2: Small disc bulge. Minimal bilateral facet arthrosis. No significant spinal  canal stenosis. No significant foraminal stenosis.      L2-3: Tiny disc bulge. Mild bilateral facet arthrosis. Flavum hypertrophy. Minimal spinal canal stenosis.  No significant foraminal stenosis. .      L3-4: Small disc bulge. Posterior epidural fat proliferation. Flavum  hypertrophy. Mild to moderate spinal canal stenosis. Mild bilateral foraminal  stenosis. Similar to prior.      L4-5: Disc desiccation. Mixed Modic degenerative endplate changes. Small to  moderate-sized disc bulge. Moderate ligamentum flavum hypertrophy. Moderate to  severe right and moderate left facet arthrosis. Moderate degree of synovitis  about the right facet joint with mild associated marrow edema. Small posteriorly  oriented right-sided facet joint synovial cyst. Interval progressive severe  spinal canal narrowing with residual AP canal diameter of 3.5 mm. Interval  progression of moderate to severe bilateral foraminal stenosis.     L5-S1: Moderate-sized bulge. Mild to moderate bilateral facet arthrosis. No  significant spinal canal stenosis. Severe bilateral foraminal stenosis. Similar  prior.      IMPRESSION  IMPRESSION:  1. Interval progressive degenerative changes at L4/L5.  -Multifactorial causes for severe spinal canal and moderate to severe bilateral  foraminal stenosis. -Mixed Modic degenerative endplate changes at U9/W1.  -Moderate to severe right and moderate left L4/L5 facet arthrosis with right  sided capsular hypertrophy with synovitis and a small posteriorly oriented  synovial cyst. Findings likely reflect active osteoarthritis; septic arthritis  is a less likely differential consideration but not excluded. Suggest  correlation with ESR and CRP.     2. Similar degree of multifactorial changes at L5/S1, with associated severe  bilateral foraminal stenosis.     3. Additional lower grade degenerative changes at multiple levels, similar to  prior. Please see above for additional details. Lumbar MRI images taken on 2/20/18 personally reviewed with patient:  FINDINGS:       Sagittal images show slightly straightened lordosis. There is no vertebral body  compression deformity or significant listhesis. There is a small amount of edema  within the posterior inferior margin of L4 and the superior posterior right  lateral margin of L5 adjacent. There is a low T1 and T2 signal 1 cm defect  within the superior midline L5 endplate which may be a Schmorl's node. Mild to  moderate disc space narrowing at this level. .       There is no other vertebral body edema. Disc desiccation with slight narrowing  L1-2. Cauda equina tapers at L1-2 level.      Partially imaged kidneys show global relative mild to moderate atrophy of the  right kidney compared to the left, partially included. There are anterior and  posterior left parapelvic renal cysts, with a 6 cm lobulated posterior cortical  partially exophytic cyst. Abdominal CT from 2/2006 is not available in PACS for  comparison. Daxa Humbles  Correlation with axial images shows:      L1-2:  Mild diffuse disc bulge. No significant canal narrowing. There is mild  bilateral foraminal encroachment.      L2-3:  Posterior ligamentous thickening up to 5 mm. Minimal diffuse disc bulge. Central canal slightly narrowed to 10.8 mm. No significant foraminal stenosis.      L3-4:  Mild facet degenerative change with posterior ligamentous thickening up  to 5.5 mm. There is mild diffuse disc bulge. Central canal is mildly to  moderately narrowed down to 8.5 mm. There is a component of posterior epidural  lipomatosis. Mild lateral recess narrowing and mild bilateral foraminal  encroachment.      L4-5:  Fairly severe bilateral degenerative facet hypertrophy. There is  posterior ligamentous thickening up to 7 mm. There is a diffuse disc bulge at  this level which contacts and flattens the thecal sac from anterior. Central  canal is significantly narrowed down to 6 mm AP diameter. Moderate to severe  left-sided foraminal stenosis due to facet hypertrophy predominantly but also  disc bulge laterally which appears to contact the caudal surface of the exiting  nerve root.  Similar findings on the right.      L5-S1:  Moderate bilateral degenerative facet hypertrophy with mild ligamentous  thickening. There is a broad-based posterior disc bulge or protrusion which  extends 4 mm into the canal. Central canal is mildly narrowed down to 10 mm  subsequent. There is moderate to severe left and right foraminal stenosis due to  facet encroachment and endplate ridging and disc bulge.          IMPRESSION  IMPRESSION:      1.  Fairly severe central canal narrowing and bilateral foraminal stenosis L4-5 on a multifactorial degenerative basis as above. -There is some mild edema within the posterior adjoining endplates at this  level.      2. Broad-based posterior disc bulge or protrusion, with mild canal but fairly  severe bilateral neural foraminal narrowing L5-S1 as above. 33 minutes of face-to-face contact were spent with the patient during today's visit extensively discussing symptoms and treatment plan. All questions were answered. More than half of this visit today was spent on counseling.      Written by John Lemus as dictated by Tiera Chauhan MD

## 2019-11-15 ENCOUNTER — OFFICE VISIT (OUTPATIENT)
Dept: ORTHOPEDIC SURGERY | Age: 64
End: 2019-11-15

## 2019-11-15 VITALS
RESPIRATION RATE: 16 BRPM | BODY MASS INDEX: 30.21 KG/M2 | DIASTOLIC BLOOD PRESSURE: 101 MMHG | HEART RATE: 65 BPM | HEIGHT: 70 IN | WEIGHT: 211 LBS | SYSTOLIC BLOOD PRESSURE: 153 MMHG

## 2019-11-15 DIAGNOSIS — M48.062 SPINAL STENOSIS OF LUMBAR REGION WITH NEUROGENIC CLAUDICATION: Primary | ICD-10-CM

## 2019-11-15 DIAGNOSIS — M54.50 LUMBAR PAIN: ICD-10-CM

## 2019-11-15 NOTE — PROGRESS NOTES
[address]  [address], [address]  [place], [address] [place] [address] Street  Phone: [phone]  Fax: [phone]  INITIAL CONSULTATION  Patient: Boris Anthony                MRN: [id number]       SSN: [id number]  YOB: 1955        AGE: 59 y.o. SEX: male  Body mass index is 30.28 kg/m². PCP: Esequiel Watt MD  11/15/19    Chief Complaint   Patient presents with    Back Pain     SC         HISTORY OF PRESENT ILLNESS, RADIOGRAPHS, and PLAN:         HISTORY OF PRESENT ILLNESS:  Mr. Fransisco Hendricks is seen today at the request of Dr. Ronnie Rubinstein and Dr. Bard Lynne. Mr. Fransisco Hendricks is a pleasant, 55-year-old male, generally healthy, retired shipyard manager. He has had a progression of back, buttock, and leg pain worse with ambulation with numbness, tingling, and weakness in his legs. He has classic shopping cart sign and a difficult time ambulating long distances. Initially, he had epidural steroids, which provided him excellent relief for over one year, but more recent injections have been ineffective. He denies bowel or bladder dysfunction, fever, chills, or night sweats, weight loss or weight gain. RADIOGRAPHS:  MRI of his lumbar spine demonstrates severe occlusive stenosis with a pinpoint canal at L4-5 due to facet and ligamentous hypertrophy. There is no gross instability present on flexion and extension views. ASSESSMENT/PLAN: I discussed the matter at length with the patient. He has classic spinal stenosis without instability. I think he would do well from a decompression. I do not believe he requires fusion. A bilateral hemilaminectomy medial facetectomy, I think, is likely to provide him significant pain relief. Midline structures may have to be taken given the severity of his global stenosis. We discussed the risks, benefits, complications, and alternatives to surgery. The patient wishes to proceed.   We will proceed with surgery once the appropriate approvals and clearances take place. cc: David Guzman M.D. 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 Hamlet Vega liver 11/00    Fib-4 was 0.54 from 10/14; US 2/18 showed fatty liver and hepatomegaly    GERD (gastroesophageal reflux disease)     and HH on UGI 11/00    Hip bursitis     Dr Nahed Herman left 2015    Hyperlipidemia     calculated 10 yr risk score 15.2% (2/19)    Prediabetes 2011     Renal cysts, acquired, bilateral 02/2018    Us showed 1.4cm cyst right, 7 cm and 2.5 cm cysts on left    Rhinophyma     S/P cardiac cath 2000    nl, EF 60% Dr. Johnathan Casas Umbilical hernia        Family History   Problem Relation Age of Onset    Other Father         cardiomyopathy    Heart Disease Brother        Current Outpatient Medications   Medication Sig Dispense Refill    pregabalin (LYRICA) 225 mg capsule Take 1 Cap by mouth two (2) times a day. Max Daily Amount: 450 mg. 60 Cap 2    omeprazole (PRILOSEC) 40 mg capsule TAKE ONE CAPSULE BY MOUTH DAILY 90 Cap 2    amitriptyline (ELAVIL) 25 mg tablet Take 3 Tabs by mouth nightly. 90 Tab 2    celecoxib (CELEBREX) 200 mg capsule TAKE ONE CAPSULE BY MOUTH DAILY 90 Cap 3    amLODIPine (NORVASC) 5 mg tablet TAKE ONE TABLET BY MOUTH DAILY 90 Tab 3    Omega-3 Fatty Acids (FISH OIL) 500 mg cap Take 500 mg by mouth two (2) times a day. 60 Cap 0    zinc-colostrum-egg-[id number] 15 mg-50 mg- 1 mg-1 mg cap Take  by mouth.  acetaminophen (TYLENOL EXTRA STRENGTH) 500 mg tablet Take 1,000 mg by mouth every six (6) hours as needed for Pain.  cholecalciferol (VITAMIN D3) 1,000 unit tablet Take 2,500 Units by mouth daily.       ascorbic acid (VITAMIN C) 500 mg tablet Take  by mouth.  multivitamin (ONE A DAY) tablet Take 1 Tab by mouth daily.  cyanocobalamin 1,000 mcg tablet Take 1,000 mcg by mouth daily.          Allergies   Allergen Reactions    Cymbalta [Duloxetine] Other (comments)     Felt funny       Past Surgical History:   Procedure Laterality Date    HX COLONOSCOPY      Dr. Philipp Haines 10/10 diverticulosis    HX HERNIA REPAIR      HX ORTHOPAEDIC      Dr. 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 Soheila Toro liver     Fib-4 was 0.54 from 10/14; US  showed fatty liver and hepatomegaly    GERD (gastroesophageal reflux disease)     and HH on UGI     Hip bursitis     Dr Patel Lipoma left     Hyperlipidemia     calculated 10 yr risk score 15.2% (2)    Prediabetes      Renal cysts, acquired, bilateral 2018    Us showed 1.4cm cyst right, 7 cm and 2.5 cm cysts on left    Rhinophyma     S/P cardiac cath     nl, EF 60% Dr. 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[employer] resource strain: Not on file    Food insecurity:     Worry: Not on file     Inability: Not on file    Transportation needs:     Medical: Not on file     Non-medical: Not on file   Tobacco Use    Smoking status: Former Smoker     Last attempt to quit: 2005     Years since quittin.8    Smokeless tobacco: Never Used    Tobacco comment: 60+ py   Substance and Sexual Activity    Alcohol use: No    Drug use: No    Sexual activity: Not on file   Lifestyle    Physical activity:     Days per week: Not on file     Minutes per session: Not on file    Stress: Not on file   Relationships    Social connections:     Talks on phone: Not on file     Gets together: Not on file     Attends Mandaen service: Not on file     Active member of club or organization: Not on file     Attends meetings of clubs or organizations: Not on file     Relationship status: Not on file    Intimate partner violence:     Fear of current or ex partner: Not on file     Emotionally abused: Not on file     Physically abused: Not on file     Forced sexual activity: Not on file   Other Topics Concern    Not on file   Social History Narrative    Not on file           REVIEW OF SYSTEMS:   CONSTITUTIONAL SYMPTOMS:  Negative. EYES:  Negative. EARS, NOSE, THROAT AND MOUTH:  Negative. CARDIOVASCULAR:  Negative. RESPIRATORY:  Negative. GENITOURINARY: Per HPI. GASTROINTESTINAL:  Per HPI. INTEGUMENTARY (SKIN AND/OR BREAST):  Negative. MUSCULOSKELETAL: Per HPI.   ENDOCRINE/RHEUMATOLOGIC:  Negative. NEUROLOGICAL:  Per HPI. HEMATOLOGIC/LYMPHATIC:  Negative. ALLERGIC/IMMUNOLOGIC:  Negative. PSYCHIATRIC:  Negative. PHYSICAL EXAMINATION:   Visit Vitals  BP (!) 153/101 (BP 1 Location: Left arm, BP Patient Position: Sitting)   Pulse 65   Resp 16   Ht 5' 10\" (1.778 m)   Wt 211 lb (95.7 kg)   BMI 30.28 kg/m²    PAIN SCALE: 5/10    CONSTITUTIONAL: The patient is in no apparent distress and is alert and oriented x 3. HEENT: Normocephalic. Hearing grossly intact. NECK: Supple and symmetric. no tenderness, or masses were felt. RESPIRATORY: No labored breathing. CARDIOVASCULAR: The carotid pulses were normal. Peripheral pulses were 2+. CHEST: Normal AP diameter and normal contour without any kyphoscoliosis.   LYMPHATIC: No lymphadenopathy was appreciated in the neck, axillae or groin. SKIN:  Negative for scars, rashes, lesions, or ulcers on the right upper, right lower, left upper, left lower and trunk. NEUROLOGICAL: Alert and oriented x 3. Ambulation without assistive device. FWB. EXTREMITIES:  See musculoskeletal.  MUSCULOSKELETAL:   Head and Neck:  Negative for misalignment, asymmetry, crepitation, defects, tenderness masses or effusions.  Left Upper Extremity: Inspection, percussion and palpation performed. Tovars sign is negative.  Right Upper Extremity: Inspection, percussion and palpation performed. Tovars sign is negative.  Spine, Ribs and Pelvis: Back pain radiating into BLE. + shopping cart. Inspection, percussion and palpation performed. Negative for misalignment, asymmetry, crepitation, defects, tenderness masses or effusions.  Left Lower Extremity: Pain and paresthesias. Inspection, percussion and palpation performed. Negative straight leg raise.  Right Lower Extremity: Pain and paresthesias. Inspection, percussion and palpation performed. Negative straight leg raise. SPINE EXAM:     Lumbar spine: No rash, ecchymosis, or gross obliquity. No focal atrophy is noted. ASSESSMENT    ICD-10-CM ICD-9-CM    1. Spinal stenosis of lumbar region with neurogenic claudication M48.062 724.03    2. Lumbar pain M54.5 724.2 AMB POC XRAY, SPINE, LUMBOSACRAL; 4+       Written by Bobo Grace, as dictated by Consuelo Pedroza MD.    I, Dr. Consuelo Pedroza MD, confirm that all documentation is accurate.

## 2019-11-15 NOTE — PROGRESS NOTES
[address] [facility]   Pre-Surgical Worksheet    Patient: Nikunj Macario                         MRN: [id number]     Age:  59 y.o.,      Sex: male    YOB: 1955           HARRY: November 15, 2019  PCP: Eileen Balderas MD    Allergies   Allergen Reactions    Cymbalta [Duloxetine] Other (comments)     Felt funny         ICD-10-CM ICD-9-CM    1. Spinal stenosis of lumbar region with neurogenic claudication M48.062 724.03    2. Lumbar pain M54.5 724.2 AMB POC XRAY, SPINE, LUMBOSACRAL; 4+       Surgery: L4/5 Laminectomy. Pain Assessment   Pain Assessment  11/15/2019   Location of Pain Back;Leg   Location Modifiers Right;Left   Severity of Pain 5   Quality of Pain Aching   Quality of Pain Comment numbness tingling   Duration of Pain Persistent   Frequency of Pain Constant   Aggravating Factors Standing;Walking;Straightening   Aggravating Factors Comment -   Limiting Behavior Yes   Relieving Factors (No Data)   Relieving Factors Comment resting helps some but it never goes completely away   Result of Injury -       Visit Vitals  BP (!) 153/101 (BP 1 Location: Left arm, BP Patient Position: Sitting)   Pulse 65   Resp 16   Ht 5' 10\" (1.778 m)   Wt 211 lb (95.7 kg)   BMI 30.28 kg/m²       ADL Limits: Dressing    Spine Surgery?: No.    Spinal Injections?: Yes  When AUG 2019. Where? .    Physical Therapy?: No.    NSAID's?: Yes    Pain Medications?: No.    In Pain Management: No    Current Outpatient Medications   Medication Sig    pregabalin (LYRICA) 225 mg capsule Take 1 Cap by mouth two (2) times a day. Max Daily Amount: 450 mg.    omeprazole (PRILOSEC) 40 mg capsule TAKE ONE CAPSULE BY MOUTH DAILY    amitriptyline (ELAVIL) 25 mg tablet Take 3 Tabs by mouth nightly.     celecoxib (CELEBREX) 200 mg capsule TAKE ONE CAPSULE BY MOUTH DAILY    amLODIPine (NORVASC) 5 mg tablet TAKE ONE TABLET BY MOUTH DAILY    Omega-3 Fatty Acids (FISH OIL) 500 mg cap Take 500 mg by mouth two (2) times a day.    zinc-colostrum-egg-deo859-[id number] 15 mg-50 mg- 1 mg-1 mg cap Take  by mouth.  acetaminophen (TYLENOL EXTRA STRENGTH) 500 mg tablet Take 1,000 mg by mouth every six (6) hours as needed for Pain.  cholecalciferol (VITAMIN D3) 1,000 unit tablet Take 2,500 Units by mouth daily.  ascorbic acid (VITAMIN C) 500 mg tablet Take  by mouth.  multivitamin (ONE A DAY) tablet Take 1 Tab by mouth daily.  cyanocobalamin 1,000 mcg tablet Take 1,000 mcg by mouth daily. No current facility-administered medications for this visit.         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 Kristyn Strickland liver 11/00    Fib-4 was 0.54 from 10/14; US 2/18 showed fatty liver and hepatomegaly    GERD (gastroesophageal reflux disease)     and HH on UGI 11/00    Hip bursitis     Dr Destini Palencia left 2015    Hyperlipidemia     calculated 10 yr risk score 15.2% (2/19)    Prediabetes 2011     Renal cysts, acquired, bilateral 02/2018    Us showed 1.4cm cyst right, 7 cm and 2.5 cm cysts on left    Rhinophyma     S/P cardiac cath 2000    nl, EF 60% Dr. Kris Montes Umbilical hernia        Past Surgical History:   Procedure Laterality Date   Andrew Rowe 10/10 diverticulosis    HX Basia Brace  2012    Dr. 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

## 2019-11-15 NOTE — PATIENT INSTRUCTIONS
Lumbar Laminectomy: Before Your Surgery  What is a lumbar laminectomy? A lumbar laminectomy is surgery to ease pressure on the spinal cord and nerves of the lower spine. The doctor makes a 3- to 4-inch cut in the lower back. This cut is called an incision. Then the doctor takes out pieces of bone that are squeezing the spinal cord and nerves. He or she may also take out other tissues. Many people have less pain soon after surgery. But your back may feel stiff and sore for a few months. Most people go home 1 to 2 days after surgery. You will likely return to work or your normal routine in 4 to 6 weeks. Follow-up care is a key part of your treatment and safety. Be sure to make and go to all appointments, and call your doctor if you are having problems. It's also a good idea to know your test results and keep a list of the medicines you take. What happens before surgery?   Surgery can be stressful. This information will help you understand what you can expect. And it will help you safely prepare for your surgery.   Preparing for surgery    · Understand exactly what surgery is planned, along with the risks, benefits, and other options. · Tell your doctors ALL the medicines, vitamins, supplements, and herbal remedies you take. Some of these can increase the risk of bleeding or interact with anesthesia.     · If you take blood thinners, such as warfarin (Coumadin), clopidogrel (Plavix), or aspirin, be sure to talk to your doctor. He or she will tell you if you should stop taking these medicines before your surgery. Make sure that you understand exactly what your doctor wants you to do.     · Your doctor will tell you which medicines to take or stop before your surgery. You may need to stop taking certain medicines a week or more before surgery. So talk to your doctor as soon as you can.     · If you have an advance directive, let your doctor know.  It may include a living will and a durable power of  for health care. Bring a copy to the hospital. If you don't have one, you may want to prepare one. It lets your doctor and loved ones know your health care wishes. Doctors advise that everyone prepare these papers before any type of surgery or procedure. What happens on the day of surgery? · Follow the instructions exactly about when to stop eating and drinking. If you don't, your surgery may be canceled. If your doctor has told you to take your medicines on the day of surgery, take them with only a sip of water.     · Take a bath or shower before you come in for your surgery. Do not apply lotions, perfumes, deodorants, or nail polish.     · Do not shave the surgical site yourself.     · Take off all jewelry and piercings. And take out contact lenses, if you wear them.    At the hospital or surgery center   · Bring a picture ID.     · The area for surgery is often marked to make sure there are no errors.     · You will be kept comfortable and safe by your anesthesia provider. You will be asleep during the surgery.     · The surgery will take about 1 to 1½ hours. If a spinal fusion is done at the same time, surgery will last 2 to 4 hours. Going home   · Be sure you have someone to drive you home. Anesthesia and pain medicine make it unsafe for you to drive.     · You will be given more specific instructions about recovering from your surgery. They will cover things like diet, wound care, follow-up care, driving, and getting back to your normal routine. When should you call your doctor? · You have questions or concerns.     · You don't understand how to prepare for your surgery.     · You become ill before the surgery (such as fever, flu, or a cold).     · You need to reschedule or have changed your mind about having the surgery. Where can you learn more? Go to http://martina-dale.info/.   Enter S346 in the search box to learn more about \"Lumbar Laminectomy: Before Your Surgery. \"  Current as of: June 26, 2019  Content Version: 12.2  © 3074-9551 [employer]. Care instructions adapted under license by [employer] (which disclaims liability or warranty for this information). If you have questions about a medical condition or this instruction, always ask your healthcare professional. [address] any warranty or liability for your use of this information.

## 2019-11-25 ENCOUNTER — HOSPITAL ENCOUNTER (OUTPATIENT)
Dept: LAB | Age: 64
Discharge: HOME | End: 2019-11-25
Payer: COMMERCIAL

## 2019-11-25 DIAGNOSIS — M48.062 SPINAL STENOSIS OF LUMBAR REGION WITH NEUROGENIC CLAUDICATION: ICD-10-CM

## 2019-11-25 LAB
ANION GAP SERPL CALC-SCNC: 5 MMOL/L (ref 3–18)
ATRIAL RATE: 54 BPM
BASOPHILS # BLD: 0 K/UL (ref 0–0.1)
BASOPHILS NFR BLD: 0 % (ref 0–2)
BUN SERPL-MCNC: 21 MG/DL (ref 7–18)
BUN/CREAT SERPL: 20 (ref 12–20)
CALCIUM SERPL-MCNC: 9.2 MG/DL (ref 8.5–10.1)
CALCULATED P AXIS, ECG09: 22 DEGREES
CALCULATED R AXIS, ECG10: 0 DEGREES
CALCULATED T AXIS, ECG11: 19 DEGREES
CHLORIDE SERPL-SCNC: 106 MMOL/L (ref 100–111)
CO2 SERPL-SCNC: 29 MMOL/L (ref 21–32)
CREAT SERPL-MCNC: 1.07 MG/DL (ref 0.6–1.3)
DIAGNOSIS, 93000: NORMAL
DIFFERENTIAL METHOD BLD: NORMAL
EOSINOPHIL # BLD: 0.2 K/UL (ref 0–0.4)
EOSINOPHIL NFR BLD: 3 % (ref 0–5)
ERYTHROCYTE [DISTWIDTH] IN BLOOD BY AUTOMATED COUNT: 13 % (ref 11.6–14.5)
GLUCOSE SERPL-MCNC: 103 MG/DL (ref 74–99)
HCT VFR BLD AUTO: 45 % (ref 36–48)
HGB BLD-MCNC: 15.9 G/DL (ref 13–16)
LYMPHOCYTES # BLD: 2.4 K/UL (ref 0.9–3.6)
LYMPHOCYTES NFR BLD: 35 % (ref 21–52)
MCH RBC QN AUTO: 32.6 PG (ref 24–34)
MCHC RBC AUTO-ENTMCNC: 35.3 G/DL (ref 31–37)
MCV RBC AUTO: 92.4 FL (ref 74–97)
MONOCYTES # BLD: 0.5 K/UL (ref 0.05–1.2)
MONOCYTES NFR BLD: 8 % (ref 3–10)
NEUTS SEG # BLD: 3.7 K/UL (ref 1.8–8)
NEUTS SEG NFR BLD: 54 % (ref 40–73)
P-R INTERVAL, ECG05: 148 MS
PLATELET # BLD AUTO: 243 K/UL (ref 135–420)
PMV BLD AUTO: 10 FL (ref 9.2–11.8)
POTASSIUM SERPL-SCNC: 4.4 MMOL/L (ref 3.5–5.5)
Q-T INTERVAL, ECG07: 420 MS
QRS DURATION, ECG06: 84 MS
QTC CALCULATION (BEZET), ECG08: 398 MS
RBC # BLD AUTO: 4.87 M/UL (ref 4.7–5.5)
SODIUM SERPL-SCNC: 140 MMOL/L (ref 136–145)
VENTRICULAR RATE, ECG03: 54 BPM
WBC # BLD AUTO: 6.8 K/UL (ref 4.6–13.2)

## 2019-11-25 PROCEDURE — 36415 COLL VENOUS BLD VENIPUNCTURE: CPT

## 2019-11-25 PROCEDURE — 85025 COMPLETE CBC W/AUTO DIFF WBC: CPT

## 2019-11-25 PROCEDURE — 80048 BASIC METABOLIC PNL TOTAL CA: CPT

## 2019-11-25 PROCEDURE — 93005 ELECTROCARDIOGRAM TRACING: CPT

## 2019-11-28 NOTE — PROGRESS NOTES
59 y.o. WHITE OR  male who presents for med clearance    He will be undergoing C4-5 bilat hemilaminectomy and medial facectomy by Dr Matthew Payne in a week for spinal stenosis    Denies any cardiovascular complaints. Not much exercise. Of note, he had eval at the hospital earlier this year which did reveal atherosclerosis as below    Denies polyuria, polydipsia, nocturia, vision change. Not checking sugars at this time.   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 Corrie Evangelista liver 11/00    Fib-4 was 0.54 from 10/14; US 2/18 showed fatty liver and hepatomegaly    GERD (gastroesophageal reflux disease)     and HH on UGI 11/00    Hip bursitis     Dr Ta Stewart left 2015    Hyperlipidemia     calculated 10 yr risk score 15.2% (2/19)    Prediabetes 2011     Renal cysts, acquired, bilateral 02/2018    Us showed 1.4cm cyst right, 7 cm and 2.5 cm cysts on left    Rhinophyma     S/P cardiac cath 2000    nl, EF 60% Dr. Lb Costa Umbilical hernia      Past Surgical History:   Procedure Laterality Date    CARDIAC SURG PROCEDURE UNLIST  02/2019    echo showed milc conc lvh, ef 65%, no wma abn, nl valves    CARDIAC SURG PROCEDURE UNLIST  02/2019    CTA chest showed atherosclerosis RCA and LAD    HX COLONOSCOPY      Dr. Chelita Auguste 10/10 diverticulosis    HX HERNIA REPAIR  02/2019    Dr Alek Francois umblical hernia repair    HX ORTHOPAEDIC  2012    Dr. 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: Not on file    Food insecurity:     Worry: Not on file     Inability: Not on file   [employer] needs:     Medical: Not on file     Non-medical: Not on file   Tobacco Use    Smoking status: Former Smoker     Last attempt to quit: 2005     Years since quittin.9    Smokeless tobacco: Never Used    Tobacco comment: 60+ py   Substance and Sexual Activity    Alcohol use: No    Drug use: No    Sexual activity: Not on file   Lifestyle    Physical activity:     Days per week: Not on file     Minutes per session: Not on file    Stress: Not on file   Relationships    Social connections:     Talks on phone: Not on file     Gets together: Not on file     Attends [employer]ism service: Not on file     Active member of club or organization: Not on file     Attends meetings of clubs or organizations: Not on file     Relationship status: Not on file    Intimate partner violence:     Fear of current or ex partner: Not on file     Emotionally abused: Not on file     Physically abused: Not on file     Forced sexual activity: Not on file   Other Topics Concern    Not on file   Social History Narrative    Not on file     Current Outpatient Medications   Medication Sig    atorvastatin (LIPITOR) 20 mg tablet Take 1 Tab by mouth daily.  pregabalin (LYRICA) 225 mg capsule Take 1 Cap by mouth two (2) times a day. Max Daily Amount: 450 mg.    omeprazole (PRILOSEC) 40 mg capsule TAKE ONE CAPSULE BY MOUTH DAILY    celecoxib (CELEBREX) 200 mg capsule TAKE ONE CAPSULE BY MOUTH DAILY    amLODIPine (NORVASC) 5 mg tablet TAKE ONE TABLET BY MOUTH DAILY    Omega-3 Fatty Acids (FISH OIL) 500 mg cap Take 500 mg by mouth two (2) times a day.     zinc-colostrum-[id number] 15 mg-50 mg- 1 mg-1 mg cap Take  by mouth.  acetaminophen (TYLENOL EXTRA STRENGTH) 500 mg tablet Take 1,000 mg by mouth every six (6) hours as needed for Pain.  cholecalciferol (VITAMIN D3) 1,000 unit tablet Take 2,500 Units by mouth daily.  ascorbic acid (VITAMIN C) 500 mg tablet Take  by mouth.  multivitamin (ONE A DAY) tablet Take 1 Tab by mouth daily.  cyanocobalamin 1,000 mcg tablet Take 1,000 mcg by mouth daily.  amitriptyline (ELAVIL) 25 mg tablet Take 3 Tabs by mouth nightly. No current facility-administered medications for this visit. Allergies   Allergen Reactions    Cymbalta [Duloxetine] Other (comments)     Felt funny     REVIEW OF SYSTEMS: colo 10/10 Dr Peters Medicmarley no vision change or eye pain  Oral  no mouth pain, tongue or tooth problems  Ears  no hearing loss, ear pain, fullness, no swallowing problems  Cardiac  no CP, PND, orthopnea, edema, palpitations or syncope  Chest  no breast masses  Resp  no wheezing, chronic coughing, dyspnea  GI  no nausea, vomiting, change in bowel habits, bleeding, hemorrhoids  Urinary  no dysuria, hematuria, flank pain, urgency, frequency    Visit Vitals  /85   Pulse 63   Temp 98.1 °F (36.7 °C) (Oral)   Resp 14   Ht 5' 10\" (1.778 m)   Wt 203 lb (92.1 kg)   SpO2 94%   BMI 29.13 kg/m²   A&O x3  Affect is appropriate. Mood stable  No apparent distress  Anicteric, no JVD, adenopathy or thyromegaly. No carotid bruits or radiated murmur  Lungs clear to auscultation, no wheezes or rales  Heart showed regular rate and rhythm. No murmur, rubs, gallops  Abdomen soft nontender, no hepatosplenomegaly or masses. Extremities without edema.   Pulses 1-2+ symmetrically    LABS  From 12/10 showed gluc 118, cr 1.10,             alt 51, hba1c 6.1,         wbc 7.9, hb 16.0, plt 261, psa 1.2, ua neg  From 7/11 showed                                           2hr GTT 58  From 5/12 showed   gluc 114, cr 1.10, gfr>60,                         wbc 7.0, hb 15.2, plt 240  From 10/13 showed gluc 111, cr 0.92, gfr 91,  alt 30,          chol 169, tg 47,   hdl 51, ldl-c 109, wbc 5.8, hb 15.2, plt 226, psa 1.2  From 10/14 showed gluc 102, cr 1.04, gfr>60, alt 31,          chol 177, tg 97,   hdl 45, ldl-c 113, wbc 7.0, hb 15.5, plt 289, k 5.9  From 10/14 showed        hba1c 6.3  From 12/15 showed gluc 110, cr 1.17, gfr>60, alt 52, hba1c 6.2, chol 179, tg 79,   hdl 53, ldl-c 110, wbc 5.8, hb 16.6, plt 244, ua neg  From 1/17 showed   gluc 116, cr 0.98, gfr>60, alt 67,          chol 165, tg 177, hdl 48, ldl-c 82,   wbc 6.6, hb 16.0, plt 229, ua neg  From 1/18 showed   gluc 109, cr 0.95, gfr>60, alt 64, hba1c 6.1, chol 128, tg 163, hdl 44, ldl-c 63,   wbc 6.8, hb 15.7, plt 238, vit d 33.9, tsh 1.39, ast 22, ap 75, tb 0.6  From 2/19 showed   gluc 116, cr 0.94, gfr>60, alt 58, hba1c 6.1, chol 147, tg 59,   hdl 47, ldl-c 88,   wbc 5.5, hb 14.9, plt 248, vit d 35.2, tsh 1.30    Results for orders placed or performed during the hospital encounter of 11/25/19   CBC WITH AUTOMATED DIFF   Result Value Ref Range    WBC 6.8 4.6 - 13.2 K/uL    RBC 4.87 4.70 - 5.50 M/uL    HGB 15.9 13.0 - 16.0 g/dL    HCT 45.0 36.0 - 48.0 %    MCV 92.4 74.0 - 97.0 FL    MCH 32.6 24.0 - 34.0 PG    MCHC 35.3 31.0 - 37.0 g/dL    RDW 13.0 11.6 - 14.5 %    PLATELET 689 367 - 290 K/uL    MPV 10.0 9.2 - 11.8 FL    NEUTROPHILS 54 40 - 73 %    LYMPHOCYTES 35 21 - 52 %    MONOCYTES 8 3 - 10 %    EOSINOPHILS 3 0 - 5 %    BASOPHILS 0 0 - 2 %    ABS. NEUTROPHILS 3.7 1.8 - 8.0 K/UL    ABS. LYMPHOCYTES 2.4 0.9 - 3.6 K/UL    ABS. MONOCYTES 0.5 0.05 - 1.2 K/UL    ABS. EOSINOPHILS 0.2 0.0 - 0.4 K/UL    ABS.  BASOPHILS 0.0 0.0 - 0.1 K/UL    DF AUTOMATED     METABOLIC PANEL, BASIC   Result Value Ref Range    Sodium 140 136 - 145 mmol/L    Potassium 4.4 3.5 - 5.5 mmol/L    Chloride 106 100 - 111 mmol/L    CO2 29 21 - 32 mmol/L    Anion gap 5 3.0 - 18 mmol/L    Glucose 103 (H) 74 - 99 mg/dL    BUN 21 (H) 7.0 - 18 MG/DL    Creatinine 1.07 0.6 - 1.3 MG/DL    BUN/Creatinine ratio 20 12 - 20      GFR est AA >60 >60 ml/min/1.73m2    GFR est non-AA >60 >60 ml/min/1.73m2    Calcium 9.2 8.5 - 10.1 MG/DL   EKG, 12 LEAD, INITIAL   Result Value Ref Range    Ventricular Rate 54 BPM    Atrial Rate 54 BPM    P-R Interval 148 ms    QRS Duration 84 ms    Q-T Interval 420 ms    QTC Calculation (Bezet) 398 ms    Calculated P Axis 22 degrees    Calculated R Axis 0 degrees    Calculated T Axis 19 degrees    Diagnosis       Sinus bradycardia  Low voltage QRS  Cannot rule out Anterior infarct , age undetermined  Abnormal ECG  When compared with ECG of 25-FEB-2019 12:03,  No significant change was found  Confirmed by Cleo Collins MD, ----- ([id number]) on 11/25/2019 5:13:16 PM       Patient Active Problem List   Diagnosis Code    Prediabetes 2011 R73.03    Arthritis Dr Bernarda Jon M19.90    GERD without esophagitis K21.9    Diverticulosis of colon without hemorrhage K57.30    Hyperlipidemia E78.5    Primary hypertension I10    Renal cysts, acquired, bilateral N28.1    Degenerative arthritis of lumbar spine M47.816    Coronary artery disease involving native coronary artery of native heart without angina pectoris I25.10     Assessment and plan:  1. GERD. Continue current  2. Ortho, L spine. Per Dr Bernarda Jon and Dr Anil Barrera  3. Diverticulosis. Fiber, colo 2020  4. Prediabetes. Continue lifestyle and dietary measures. Wt loss again reiterated  5. Spine. He is felt to be average risk for the planned procedure, no contraindications noted. Routine postop prophylaxis per protocol. 6. Lipids. In setting of above CTA findings, he is now amenable to starting statin. Sfx discussed, f/u labs early spring  7. HTN. Continue current regimen. RTC 3/20    Above conditions discussed at length and patient vocalized understanding.   All questions answered to patient satisfaction

## 2019-12-02 ENCOUNTER — OFFICE VISIT (OUTPATIENT)
Dept: INTERNAL MEDICINE CLINIC | Age: 64
End: 2019-12-02

## 2019-12-02 VITALS
HEART RATE: 63 BPM | TEMPERATURE: 98.1 F | HEIGHT: 70 IN | BODY MASS INDEX: 29.06 KG/M2 | RESPIRATION RATE: 14 BRPM | OXYGEN SATURATION: 94 % | DIASTOLIC BLOOD PRESSURE: 85 MMHG | WEIGHT: 203 LBS | SYSTOLIC BLOOD PRESSURE: 122 MMHG

## 2019-12-02 DIAGNOSIS — I10 PRIMARY HYPERTENSION: ICD-10-CM

## 2019-12-02 DIAGNOSIS — R73.03 PREDIABETES: ICD-10-CM

## 2019-12-02 DIAGNOSIS — I25.10 CORONARY ARTERY DISEASE INVOLVING NATIVE CORONARY ARTERY OF NATIVE HEART WITHOUT ANGINA PECTORIS: ICD-10-CM

## 2019-12-02 DIAGNOSIS — Z01.818 PREOP EXAM FOR INTERNAL MEDICINE: Primary | ICD-10-CM

## 2019-12-02 DIAGNOSIS — E78.5 HYPERLIPIDEMIA, UNSPECIFIED HYPERLIPIDEMIA TYPE: ICD-10-CM

## 2019-12-02 PROBLEM — R06.09 DYSPNEA ON EXERTION: Status: RESOLVED | Noted: 2019-02-25 | Resolved: 2019-12-02

## 2019-12-02 PROBLEM — R07.9 INTERMITTENT CHEST PAIN: Status: RESOLVED | Noted: 2019-02-25 | Resolved: 2019-12-02

## 2019-12-02 PROBLEM — J20.9 ACUTE BRONCHITIS: Status: RESOLVED | Noted: 2019-02-25 | Resolved: 2019-12-02

## 2019-12-02 PROBLEM — R07.89 ATYPICAL CHEST PAIN: Status: RESOLVED | Noted: 2019-02-25 | Resolved: 2019-12-02

## 2019-12-02 RX ORDER — ATORVASTATIN CALCIUM 20 MG/1
20 TABLET, FILM COATED ORAL DAILY
Qty: 30 TAB | Refills: 5 | Status: SHIPPED | OUTPATIENT
Start: 2019-12-02 | End: 2020-03-09

## 2019-12-02 NOTE — PROGRESS NOTES
Erin Landa presents today for   Chief Complaint   Patient presents with    Pre-op Exam     Back surgery 12/9 with Suzi Tobin              Depression Screening:  3 most recent PHQ Screens 11/15/2019   PHQ Not Done -   Little interest or pleasure in doing things Not at all   Feeling down, depressed, irritable, or hopeless Not at all   Total Score PHQ 2 0       Learning Assessment:  Learning Assessment 12/2/2019   PRIMARY LEARNER Patient   HIGHEST LEVEL OF EDUCATION - PRIMARY LEARNER  GRADUATED HIGH SCHOOL OR GED   BARRIERS PRIMARY LEARNER NONE   CO-LEARNER CAREGIVER No   CO-LEARNER NAME -   PRIMARY LANGUAGE ENGLISH    NEED -   LEARNER PREFERENCE PRIMARY READING   LEARNING SPECIAL TOPICS -   ANSWERED BY Raf Mcwilliams   RELATIONSHIP SELF       Abuse Screening:  Abuse Screening Questionnaire 10/18/2016   Do you ever feel afraid of your partner? N   Are you in a relationship with someone who physically or mentally threatens you? N   Is it safe for you to go home? Y       Fall Risk  No flowsheet data found. Coordination of Care:  1. Have you been to the ER, urgent care clinic since your last visit? Hospitalized since your last visit? no    2. Have you seen or consulted any other health care providers outside of the [address] [place] [address] since your last visit? Include any pap smears or colon screening.  no

## 2019-12-04 RX ORDER — GABAPENTIN 800 MG/1
800 TABLET ORAL 4 TIMES DAILY
COMMUNITY
End: 2021-04-29

## 2019-12-05 NOTE — H&P
Pre-Admission History and Physical    Patient: Rosie Palm   MRN: [id number]   SSN: [id number]   YOB: 1955   Age: 59 y.o. Sex: male     Patient scheduled for: L4/5 laminectomy. Date of surgery: 12/9/19. [facility] Surgeon: Melvin Cool MD    HPI:  Rosie Palm is a 59 y.o. male witha progression of back, buttock, and leg pain worse with ambulation with numbness, tingling, and weakness in his legs. He has classic shopping cart sign and a difficult time ambulating long distances. Initially, he had epidural steroids, which provided him excellent relief for over one year, but more recent injections have been ineffective. Mark Angry He reports a pain level of 5/10. MRI demonstratessevere occlusive stenosis with a pinpoint canal at L4-5 due to facet and ligamentous hypertrophy. this patient has failed the presurgical conservative treatments  including  spinal block injections and medications. Pain has impacted the patient's functional ability to dress himself without pain. He is being admitted for surgical intervention.          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 Valeria Paredes liver 11/00    Fib-4 was 0.54 from 10/14; US 2/18 showed fatty liver and hepatomegaly    GERD (gastroesophageal reflux disease)     and HH on UGI 11/00    Hip bursitis     Dr Flora Rob left 2015    Hyperlipidemia     calculated 10 yr risk score 15.2% (2/19)    Hypertension     Prediabetes 2011     Renal cysts, acquired, bilateral 02/2018    Us showed 1.4cm cyst right, 7 cm and 2.5 cm cysts on left    Rhinophyma     S/P cardiac cath     nl, EF 60% Dr. 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 Chelita Auguste 10/10 diverticulosis    HX HERNIA REPAIR  2019    Dr Alek Francois umblical hernia repair    HX ORTHOPAEDIC      Dr. Harika Marley left distal clavicle excision and acromioplasty     Family History   Problem Relation Age of Onset    Other Father         cardiomyopathy    Heart Disease Brother      Allergies   Allergen Reactions    Cymbalta [Duloxetine] Other (comments)     Felt funny     Current Outpatient Medications   Medication Sig Dispense Refill    gabapentin (NEURONTIN) 800 mg tablet Take 800 mg by mouth four (4) times daily.  pregabalin (LYRICA) 225 mg capsule Take 1 Cap by mouth two (2) times a day. Max Daily Amount: 450 mg. 60 Cap 2    omeprazole (PRILOSEC) 40 mg capsule TAKE ONE CAPSULE BY MOUTH DAILY 90 Cap 2    amLODIPine (NORVASC) 5 mg tablet TAKE ONE TABLET BY MOUTH DAILY 90 Tab 3    zinc-colostrum-egg-[id number]-[id number] 15 mg-50 mg- 1 mg-1 mg cap Take  by mouth.  acetaminophen (TYLENOL EXTRA STRENGTH) 500 mg tablet Take 1,000 mg by mouth every six (6) hours as needed for Pain.       cholecalciferol (VITAMIN D3) 1,000 unit tablet Take 2,500 Units by mouth daily.  ascorbic acid (VITAMIN C) 500 mg tablet Take  by mouth daily.  cyanocobalamin 1,000 mcg tablet Take 1,000 mcg by mouth daily.  atorvastatin (LIPITOR) 20 mg tablet Take 1 Tab by mouth daily. 30 Tab 5    amitriptyline (ELAVIL) 25 mg tablet Take 3 Tabs by mouth nightly. 90 Tab 2    celecoxib (CELEBREX) 200 mg capsule TAKE ONE CAPSULE BY MOUTH DAILY 90 Cap 3    Omega-3 Fatty Acids (FISH OIL) 500 mg cap Take 500 mg by mouth two (2) times a day. 60 Cap 0    multivitamin (ONE A DAY) tablet Take 1 Tab by mouth daily. ROS:  Denies chills, fever,night sweats,  bowel or bladder dysfunction, unexplained weight loss/weight gain, chest pain, sob or anxiety. Physical Examination    Gen: Well developed, well nourished 59 y.o. male   BP (!) 153/101 (BP 1 Location: Left arm, BP Patient Position: Sitting)   Pulse 65   Resp 16   Ht 5' 10\" (1.778 m)   Wt 211 lb (95.7 kg)   BMI 30.28 kg/m²    PAIN SCALE: 5/10     CONSTITUTIONAL: The patient is in no apparent distress and is alert and oriented x 3. HEENT: Normocephalic. Hearing grossly intact. NECK: Supple and symmetric. no tenderness, or masses were felt. RESPIRATORY: No labored breathing. CARDIOVASCULAR: The carotid pulses were normal. Peripheral pulses were 2+. CHEST: Normal AP diameter and normal contour without any kyphoscoliosis. LYMPHATIC: No lymphadenopathy was appreciated in the neck, axillae or groin. SKIN:  Negative for scars, rashes, lesions, or ulcers on the right upper, right lower, left upper, left lower and trunk. NEUROLOGICAL: Alert and oriented x 3. Ambulation without assistive device. FWB. EXTREMITIES:  See musculoskeletal.  MUSCULOSKELETAL:  · Head and Neck:  Negative for misalignment, asymmetry, crepitation, defects, tenderness masses or effusions. · Left Upper Extremity: Inspection, percussion and palpation performed.   Tovars sign is negative. · Right Upper Extremity: Inspection, percussion and palpation performed. Tovars sign is negative. · Spine, Ribs and Pelvis: Back pain radiating into BLE. + shopping cart. Inspection, percussion and palpation performed. Negative for misalignment, asymmetry, crepitation, defects, tenderness masses or effusions. · Left Lower Extremity: Pain and paresthesias. Inspection, percussion and palpation performed. Negative straight leg raise. · Right Lower Extremity: Pain and paresthesias. Inspection, percussion and palpation performed. Negative straight leg raise.        SPINE EXAM:      Lumbar spine: No rash, ecchymosis, or gross obliquity. No focal atrophy is noted.          Assessment and Plan    Due to the pt's persistent symptoms unrelieved by conservative measure Vasquez Del Castillo is being admitted [facility] to undergo surgical intervention. The post-operative plan of care consists of physical therapy, home health and a 2 week f/u office visit. We are pending medical clearance by Dr. Ricardo Puga. The risks, benefits, complications and alternatives to surgery have been discussed in detail with the patient. The patient understands and agrees to proceed.      Jasper Pereira, DANIEL-C dictating for Zen Ramirez MD

## 2019-12-06 ENCOUNTER — ANESTHESIA EVENT (OUTPATIENT)
Dept: SURGERY | Age: 64
End: 2019-12-06
Payer: COMMERCIAL

## 2019-12-09 ENCOUNTER — HOSPITAL ENCOUNTER (OUTPATIENT)
Age: 64
Setting detail: OBSERVATION
Discharge: HOME | End: 2019-12-10
Attending: ORTHOPAEDIC SURGERY | Admitting: ORTHOPAEDIC SURGERY
Payer: COMMERCIAL

## 2019-12-09 ENCOUNTER — ANESTHESIA (OUTPATIENT)
Dept: SURGERY | Age: 64
End: 2019-12-09
Payer: COMMERCIAL

## 2019-12-09 ENCOUNTER — APPOINTMENT (OUTPATIENT)
Dept: GENERAL RADIOLOGY | Age: 64
End: 2019-12-09
Attending: ORTHOPAEDIC SURGERY
Payer: COMMERCIAL

## 2019-12-09 DIAGNOSIS — Z98.890 S/P LUMBAR LAMINECTOMY: Primary | ICD-10-CM

## 2019-12-09 PROBLEM — M48.061 SPINAL STENOSIS OF LUMBAR REGION: Status: ACTIVE | Noted: 2019-12-09

## 2019-12-09 LAB
GLUCOSE BLD STRIP.AUTO-MCNC: 108 MG/DL (ref 70–110)
HCT VFR BLD AUTO: 41.8 % (ref 36–48)
HGB BLD-MCNC: 14.6 G/DL (ref 13–16)

## 2019-12-09 PROCEDURE — 77030039267 HC ADH SKN EXOFIN S2SG -B: Performed by: ORTHOPAEDIC SURGERY

## 2019-12-09 PROCEDURE — 76010000153 HC OR TIME 1.5 TO 2 HR: Performed by: ORTHOPAEDIC SURGERY

## 2019-12-09 PROCEDURE — 77030027138 HC INCENT SPIROMETER -A

## 2019-12-09 PROCEDURE — 74011250636 HC RX REV CODE- 250/636: Performed by: ORTHOPAEDIC SURGERY

## 2019-12-09 PROCEDURE — 82962 GLUCOSE BLOOD TEST: CPT

## 2019-12-09 PROCEDURE — 74011250637 HC RX REV CODE- 250/637: Performed by: NURSE ANESTHETIST, CERTIFIED REGISTERED

## 2019-12-09 PROCEDURE — 74011250636 HC RX REV CODE- 250/636: Performed by: NURSE ANESTHETIST, CERTIFIED REGISTERED

## 2019-12-09 PROCEDURE — 77030030163 HC BN WAX J&J -A: Performed by: ORTHOPAEDIC SURGERY

## 2019-12-09 PROCEDURE — 74011000250 HC RX REV CODE- 250: Performed by: NURSE ANESTHETIST, CERTIFIED REGISTERED

## 2019-12-09 PROCEDURE — 77030004402 HC BUR NEUR STRY -C: Performed by: ORTHOPAEDIC SURGERY

## 2019-12-09 PROCEDURE — 99218 HC RM OBSERVATION: CPT

## 2019-12-09 PROCEDURE — 74011250636 HC RX REV CODE- 250/636: Performed by: NURSE PRACTITIONER

## 2019-12-09 PROCEDURE — 74011250637 HC RX REV CODE- 250/637: Performed by: ORTHOPAEDIC SURGERY

## 2019-12-09 PROCEDURE — 76210000016 HC OR PH I REC 1 TO 1.5 HR: Performed by: ORTHOPAEDIC SURGERY

## 2019-12-09 PROCEDURE — 85018 HEMOGLOBIN: CPT

## 2019-12-09 PROCEDURE — 76060000034 HC ANESTHESIA 1.5 TO 2 HR: Performed by: ORTHOPAEDIC SURGERY

## 2019-12-09 PROCEDURE — 77030013567 HC DRN WND RESERV BARD -A: Performed by: ORTHOPAEDIC SURGERY

## 2019-12-09 PROCEDURE — 77030026438 HC STYL ET INTUB CARD -A: Performed by: ANESTHESIOLOGY

## 2019-12-09 PROCEDURE — 77030035236 HC SUT PDS STRATFX BARB J&J -B: Performed by: ORTHOPAEDIC SURGERY

## 2019-12-09 PROCEDURE — 77030012406 HC DRN WND PENRS BARD -A: Performed by: ORTHOPAEDIC SURGERY

## 2019-12-09 PROCEDURE — 74011000250 HC RX REV CODE- 250: Performed by: ORTHOPAEDIC SURGERY

## 2019-12-09 PROCEDURE — 77030002933 HC SUT MCRYL J&J -A: Performed by: ORTHOPAEDIC SURGERY

## 2019-12-09 PROCEDURE — 77030013079 HC BLNKT BAIR HGGR 3M -A: Performed by: ORTHOPAEDIC SURGERY

## 2019-12-09 PROCEDURE — 77030013079 HC BLNKT BAIR HGGR 3M -A: Performed by: ANESTHESIOLOGY

## 2019-12-09 PROCEDURE — 77030034479 HC ADH SKN CLSR PRINEO J&J -B: Performed by: ORTHOPAEDIC SURGERY

## 2019-12-09 PROCEDURE — 77030027138 HC INCENT SPIROMETER -A: Performed by: ORTHOPAEDIC SURGERY

## 2019-12-09 PROCEDURE — 77030040922 HC BLNKT HYPOTHRM STRY -A: Performed by: ORTHOPAEDIC SURGERY

## 2019-12-09 PROCEDURE — 77030008683 HC TU ET CUF COVD -A: Performed by: ANESTHESIOLOGY

## 2019-12-09 PROCEDURE — 77030018836 HC SOL IRR NACL ICUM -A: Performed by: ORTHOPAEDIC SURGERY

## 2019-12-09 PROCEDURE — 77030012890: Performed by: ORTHOPAEDIC SURGERY

## 2019-12-09 PROCEDURE — 36415 COLL VENOUS BLD VENIPUNCTURE: CPT

## 2019-12-09 PROCEDURE — 74011000272 HC RX REV CODE- 272: Performed by: ORTHOPAEDIC SURGERY

## 2019-12-09 PROCEDURE — 77030040361 HC SLV COMPR DVT MDII -B: Performed by: ORTHOPAEDIC SURGERY

## 2019-12-09 RX ORDER — MAGNESIUM SULFATE 100 %
4 CRYSTALS MISCELLANEOUS AS NEEDED
Status: DISCONTINUED | OUTPATIENT
Start: 2019-12-09 | End: 2019-12-09 | Stop reason: HOSPADM

## 2019-12-09 RX ORDER — ATORVASTATIN CALCIUM 20 MG/1
20 TABLET, FILM COATED ORAL DAILY
Status: DISCONTINUED | OUTPATIENT
Start: 2019-12-10 | End: 2019-12-10 | Stop reason: HOSPADM

## 2019-12-09 RX ORDER — SODIUM CHLORIDE 0.9 % (FLUSH) 0.9 %
5-40 SYRINGE (ML) INJECTION AS NEEDED
Status: DISCONTINUED | OUTPATIENT
Start: 2019-12-09 | End: 2019-12-09 | Stop reason: HOSPADM

## 2019-12-09 RX ORDER — SODIUM CHLORIDE, SODIUM LACTATE, POTASSIUM CHLORIDE, CALCIUM CHLORIDE 600; 310; 30; 20 MG/100ML; MG/100ML; MG/100ML; MG/100ML
75 INJECTION, SOLUTION INTRAVENOUS CONTINUOUS
Status: DISCONTINUED | OUTPATIENT
Start: 2019-12-09 | End: 2019-12-09 | Stop reason: HOSPADM

## 2019-12-09 RX ORDER — SUCCINYLCHOLINE CHLORIDE 20 MG/ML
INJECTION INTRAMUSCULAR; INTRAVENOUS AS NEEDED
Status: DISCONTINUED | OUTPATIENT
Start: 2019-12-09 | End: 2019-12-09 | Stop reason: HOSPADM

## 2019-12-09 RX ORDER — LIDOCAINE HYDROCHLORIDE 20 MG/ML
INJECTION, SOLUTION EPIDURAL; INFILTRATION; INTRACAUDAL; PERINEURAL AS NEEDED
Status: DISCONTINUED | OUTPATIENT
Start: 2019-12-09 | End: 2019-12-09 | Stop reason: HOSPADM

## 2019-12-09 RX ORDER — SODIUM CHLORIDE 0.9 % (FLUSH) 0.9 %
5-40 SYRINGE (ML) INJECTION AS NEEDED
Status: DISCONTINUED | OUTPATIENT
Start: 2019-12-09 | End: 2019-12-10 | Stop reason: HOSPADM

## 2019-12-09 RX ORDER — GLYCOPYRROLATE 0.2 MG/ML
INJECTION INTRAMUSCULAR; INTRAVENOUS AS NEEDED
Status: DISCONTINUED | OUTPATIENT
Start: 2019-12-09 | End: 2019-12-09 | Stop reason: HOSPADM

## 2019-12-09 RX ORDER — CELECOXIB 400 MG/1
400 CAPSULE ORAL ONCE
Status: COMPLETED | OUTPATIENT
Start: 2019-12-09 | End: 2019-12-09

## 2019-12-09 RX ORDER — SODIUM CHLORIDE 0.9 % (FLUSH) 0.9 %
5-40 SYRINGE (ML) INJECTION EVERY 8 HOURS
Status: DISCONTINUED | OUTPATIENT
Start: 2019-12-09 | End: 2019-12-09 | Stop reason: HOSPADM

## 2019-12-09 RX ORDER — NALOXONE HYDROCHLORIDE 0.4 MG/ML
0.4 INJECTION, SOLUTION INTRAMUSCULAR; INTRAVENOUS; SUBCUTANEOUS AS NEEDED
Status: DISCONTINUED | OUTPATIENT
Start: 2019-12-09 | End: 2019-12-10 | Stop reason: HOSPADM

## 2019-12-09 RX ORDER — TAMSULOSIN HYDROCHLORIDE 0.4 MG/1
0.4 CAPSULE ORAL DAILY
Status: DISCONTINUED | OUTPATIENT
Start: 2019-12-10 | End: 2019-12-10 | Stop reason: HOSPADM

## 2019-12-09 RX ORDER — GABAPENTIN 400 MG/1
800 CAPSULE ORAL 3 TIMES DAILY
Status: DISCONTINUED | OUTPATIENT
Start: 2019-12-09 | End: 2019-12-10 | Stop reason: HOSPADM

## 2019-12-09 RX ORDER — ONDANSETRON 2 MG/ML
4 INJECTION INTRAMUSCULAR; INTRAVENOUS ONCE
Status: DISCONTINUED | OUTPATIENT
Start: 2019-12-09 | End: 2019-12-09 | Stop reason: HOSPADM

## 2019-12-09 RX ORDER — CEFAZOLIN SODIUM 2 G/50ML
2 SOLUTION INTRAVENOUS EVERY 8 HOURS
Status: DISCONTINUED | OUTPATIENT
Start: 2019-12-09 | End: 2019-12-10 | Stop reason: HOSPADM

## 2019-12-09 RX ORDER — ROCURONIUM BROMIDE 10 MG/ML
INJECTION, SOLUTION INTRAVENOUS AS NEEDED
Status: DISCONTINUED | OUTPATIENT
Start: 2019-12-09 | End: 2019-12-09 | Stop reason: HOSPADM

## 2019-12-09 RX ORDER — ACETAMINOPHEN 500 MG
1000 TABLET ORAL EVERY 6 HOURS
Status: DISCONTINUED | OUTPATIENT
Start: 2019-12-09 | End: 2019-12-10 | Stop reason: HOSPADM

## 2019-12-09 RX ORDER — PROPOFOL 10 MG/ML
INJECTION, EMULSION INTRAVENOUS AS NEEDED
Status: DISCONTINUED | OUTPATIENT
Start: 2019-12-09 | End: 2019-12-09 | Stop reason: HOSPADM

## 2019-12-09 RX ORDER — SODIUM CHLORIDE 0.9 % (FLUSH) 0.9 %
5-40 SYRINGE (ML) INJECTION EVERY 8 HOURS
Status: DISCONTINUED | OUTPATIENT
Start: 2019-12-09 | End: 2019-12-10 | Stop reason: HOSPADM

## 2019-12-09 RX ORDER — BUPIVACAINE HYDROCHLORIDE 2.5 MG/ML
INJECTION, SOLUTION EPIDURAL; INFILTRATION; INTRACAUDAL AS NEEDED
Status: DISCONTINUED | OUTPATIENT
Start: 2019-12-09 | End: 2019-12-09 | Stop reason: HOSPADM

## 2019-12-09 RX ORDER — OXYCODONE HYDROCHLORIDE 5 MG/1
5-10 TABLET ORAL
Status: DISCONTINUED | OUTPATIENT
Start: 2019-12-09 | End: 2019-12-10 | Stop reason: HOSPADM

## 2019-12-09 RX ORDER — HYDROMORPHONE HYDROCHLORIDE 1 MG/ML
1 INJECTION, SOLUTION INTRAMUSCULAR; INTRAVENOUS; SUBCUTANEOUS
Status: DISCONTINUED | OUTPATIENT
Start: 2019-12-09 | End: 2019-12-10 | Stop reason: HOSPADM

## 2019-12-09 RX ORDER — DIAZEPAM 5 MG/1
5 TABLET ORAL
Status: DISCONTINUED | OUTPATIENT
Start: 2019-12-09 | End: 2019-12-10 | Stop reason: HOSPADM

## 2019-12-09 RX ORDER — VANCOMYCIN HYDROCHLORIDE 1 G/20ML
INJECTION, POWDER, LYOPHILIZED, FOR SOLUTION INTRAVENOUS AS NEEDED
Status: DISCONTINUED | OUTPATIENT
Start: 2019-12-09 | End: 2019-12-09 | Stop reason: HOSPADM

## 2019-12-09 RX ORDER — EPHEDRINE SULFATE/0.9% NACL/PF 50 MG/5 ML
SYRINGE (ML) INTRAVENOUS AS NEEDED
Status: DISCONTINUED | OUTPATIENT
Start: 2019-12-09 | End: 2019-12-09 | Stop reason: HOSPADM

## 2019-12-09 RX ORDER — MIDAZOLAM HYDROCHLORIDE 1 MG/ML
INJECTION, SOLUTION INTRAMUSCULAR; INTRAVENOUS AS NEEDED
Status: DISCONTINUED | OUTPATIENT
Start: 2019-12-09 | End: 2019-12-09 | Stop reason: HOSPADM

## 2019-12-09 RX ORDER — PANTOPRAZOLE SODIUM 40 MG/1
40 TABLET, DELAYED RELEASE ORAL
Status: DISCONTINUED | OUTPATIENT
Start: 2019-12-10 | End: 2019-12-10 | Stop reason: HOSPADM

## 2019-12-09 RX ORDER — LORAZEPAM 2 MG/ML
1 INJECTION INTRAMUSCULAR
Status: DISCONTINUED | OUTPATIENT
Start: 2019-12-09 | End: 2019-12-10 | Stop reason: HOSPADM

## 2019-12-09 RX ORDER — DIPHENHYDRAMINE HCL 25 MG
25 CAPSULE ORAL
Status: DISCONTINUED | OUTPATIENT
Start: 2019-12-09 | End: 2019-12-10 | Stop reason: HOSPADM

## 2019-12-09 RX ORDER — PREGABALIN 75 MG/1
75 CAPSULE ORAL ONCE
Status: DISCONTINUED | OUTPATIENT
Start: 2019-12-09 | End: 2019-12-09

## 2019-12-09 RX ORDER — CELECOXIB 100 MG/1
200 CAPSULE ORAL DAILY
Status: DISCONTINUED | OUTPATIENT
Start: 2019-12-10 | End: 2019-12-10 | Stop reason: HOSPADM

## 2019-12-09 RX ORDER — PREGABALIN 75 MG/1
225 CAPSULE ORAL 2 TIMES DAILY
Status: DISCONTINUED | OUTPATIENT
Start: 2019-12-09 | End: 2019-12-10 | Stop reason: HOSPADM

## 2019-12-09 RX ORDER — DIPHENHYDRAMINE HYDROCHLORIDE 50 MG/ML
12.5 INJECTION, SOLUTION INTRAMUSCULAR; INTRAVENOUS
Status: DISCONTINUED | OUTPATIENT
Start: 2019-12-09 | End: 2019-12-10 | Stop reason: HOSPADM

## 2019-12-09 RX ORDER — ONDANSETRON 2 MG/ML
4 INJECTION INTRAMUSCULAR; INTRAVENOUS
Status: DISCONTINUED | OUTPATIENT
Start: 2019-12-09 | End: 2019-12-10 | Stop reason: HOSPADM

## 2019-12-09 RX ORDER — AMLODIPINE BESYLATE 5 MG/1
5 TABLET ORAL DAILY
Status: DISCONTINUED | OUTPATIENT
Start: 2019-12-10 | End: 2019-12-10 | Stop reason: HOSPADM

## 2019-12-09 RX ORDER — CEFAZOLIN SODIUM 2 G/50ML
2 SOLUTION INTRAVENOUS ONCE
Status: COMPLETED | OUTPATIENT
Start: 2019-12-09 | End: 2019-12-09

## 2019-12-09 RX ORDER — FENTANYL CITRATE 50 UG/ML
INJECTION, SOLUTION INTRAMUSCULAR; INTRAVENOUS AS NEEDED
Status: DISCONTINUED | OUTPATIENT
Start: 2019-12-09 | End: 2019-12-09 | Stop reason: HOSPADM

## 2019-12-09 RX ORDER — NEOSTIGMINE METHYLSULFATE 1 MG/ML
INJECTION, SOLUTION INTRAVENOUS AS NEEDED
Status: DISCONTINUED | OUTPATIENT
Start: 2019-12-09 | End: 2019-12-09 | Stop reason: HOSPADM

## 2019-12-09 RX ORDER — HYDROMORPHONE HYDROCHLORIDE 2 MG/ML
0.5 INJECTION, SOLUTION INTRAMUSCULAR; INTRAVENOUS; SUBCUTANEOUS AS NEEDED
Status: DISCONTINUED | OUTPATIENT
Start: 2019-12-09 | End: 2019-12-09 | Stop reason: HOSPADM

## 2019-12-09 RX ORDER — DEXTROSE 50 % IN WATER (D50W) INTRAVENOUS SYRINGE
25-50 AS NEEDED
Status: DISCONTINUED | OUTPATIENT
Start: 2019-12-09 | End: 2019-12-09 | Stop reason: HOSPADM

## 2019-12-09 RX ADMIN — ACETAMINOPHEN 1000 MG: 500 TABLET ORAL at 22:09

## 2019-12-09 RX ADMIN — ROCURONIUM BROMIDE 5 MG: 10 INJECTION, SOLUTION INTRAVENOUS at 15:13

## 2019-12-09 RX ADMIN — MIDAZOLAM HYDROCHLORIDE 2 MG: 2 INJECTION, SOLUTION INTRAMUSCULAR; INTRAVENOUS at 15:05

## 2019-12-09 RX ADMIN — SODIUM CHLORIDE, SODIUM LACTATE, POTASSIUM CHLORIDE, AND CALCIUM CHLORIDE 75 ML/HR: 600; 310; 30; 20 INJECTION, SOLUTION INTRAVENOUS at 14:07

## 2019-12-09 RX ADMIN — FENTANYL CITRATE 50 MCG: 50 INJECTION INTRAMUSCULAR; INTRAVENOUS at 16:00

## 2019-12-09 RX ADMIN — SUCCINYLCHOLINE CHLORIDE 160 MG: 20 INJECTION, SOLUTION INTRAMUSCULAR; INTRAVENOUS at 15:41

## 2019-12-09 RX ADMIN — CEFAZOLIN 2 G: 10 INJECTION, POWDER, FOR SOLUTION INTRAVENOUS at 15:05

## 2019-12-09 RX ADMIN — CEFAZOLIN 2 G: 10 INJECTION, POWDER, FOR SOLUTION INTRAVENOUS at 22:12

## 2019-12-09 RX ADMIN — SODIUM CHLORIDE, SODIUM LACTATE, POTASSIUM CHLORIDE, AND CALCIUM CHLORIDE: 600; 310; 30; 20 INJECTION, SOLUTION INTRAVENOUS at 16:19

## 2019-12-09 RX ADMIN — Medication 3 MG: at 16:22

## 2019-12-09 RX ADMIN — SODIUM CHLORIDE, SODIUM LACTATE, POTASSIUM CHLORIDE, AND CALCIUM CHLORIDE 75 ML/HR: 600; 310; 30; 20 INJECTION, SOLUTION INTRAVENOUS at 17:00

## 2019-12-09 RX ADMIN — Medication 15 MG: at 15:26

## 2019-12-09 RX ADMIN — CELECOXIB 400 MG: 400 CAPSULE ORAL at 14:16

## 2019-12-09 RX ADMIN — LIDOCAINE HYDROCHLORIDE 80 MG: 20 INJECTION, SOLUTION EPIDURAL; INFILTRATION; INTRACAUDAL; PERINEURAL at 15:41

## 2019-12-09 RX ADMIN — PROPOFOL 200 MG: 10 INJECTION, EMULSION INTRAVENOUS at 15:41

## 2019-12-09 RX ADMIN — GABAPENTIN 800 MG: 400 CAPSULE ORAL at 22:09

## 2019-12-09 RX ADMIN — FENTANYL CITRATE 50 MCG: 50 INJECTION INTRAMUSCULAR; INTRAVENOUS at 15:13

## 2019-12-09 RX ADMIN — ROCURONIUM BROMIDE 20 MG: 10 INJECTION, SOLUTION INTRAVENOUS at 15:20

## 2019-12-09 RX ADMIN — Medication 10 ML: at 22:10

## 2019-12-09 RX ADMIN — GLYCOPYRROLATE 0.4 MG: 0.2 INJECTION INTRAMUSCULAR; INTRAVENOUS at 16:22

## 2019-12-09 RX ADMIN — FAMOTIDINE: 10 INJECTION, SOLUTION INTRAVENOUS at 14:16

## 2019-12-09 RX ADMIN — PREGABALIN 225 MG: 75 CAPSULE ORAL at 22:09

## 2019-12-09 RX ADMIN — FENTANYL CITRATE 50 MCG: 50 INJECTION INTRAMUSCULAR; INTRAVENOUS at 15:05

## 2019-12-09 RX ADMIN — GLYCOPYRROLATE 0.2 MG: 0.2 INJECTION INTRAMUSCULAR; INTRAVENOUS at 15:25

## 2019-12-09 NOTE — PERIOP NOTES
Pt had a huge clot in his WESLY drain. Bulb changed. Notified AIME Leos. She stated to have H&H drawn around 1900. Order entered and release. Will notify receiving nurse.

## 2019-12-09 NOTE — ANESTHESIA PREPROCEDURE EVALUATION
Relevant Problems   No relevant active problems       Anesthetic History   No history of anesthetic complications            Review of Systems / Medical History  Patient summary reviewed and pertinent labs reviewed    Pulmonary  Within defined limits                 Neuro/Psych   Within defined limits           Cardiovascular    Hypertension              Exercise tolerance: >4 METS     GI/Hepatic/Renal  Within defined limits              Endo/Other  Within defined limits           Other Findings   Comments:                  Physical Exam    Airway  Mallampati: II  TM Distance: 4 - 6 cm  Neck ROM: normal range of motion   Mouth opening: Normal     Cardiovascular  Regular rate and rhythm,  S1 and S2 normal,  no murmur, click, rub, or gallop  Rhythm: regular  Rate: normal         Dental  No notable dental hx       Pulmonary  Breath sounds clear to auscultation               Abdominal  GI exam deferred       Other Findings            Anesthetic Plan    ASA: 2  Anesthesia type: general          Induction: Intravenous  Anesthetic plan and risks discussed with: Patient

## 2019-12-09 NOTE — ANESTHESIA POSTPROCEDURE EVALUATION
Procedure(s):  L4/5 LAMINECTOMY. general    Anesthesia Post Evaluation      Multimodal analgesia: multimodal analgesia used between 6 hours prior to anesthesia start to PACU discharge  Patient location during evaluation: bedside  Patient participation: complete - patient participated  Level of consciousness: awake  Pain management: adequate  Airway patency: patent  Anesthetic complications: no  Cardiovascular status: acceptable  Respiratory status: acceptable  Hydration status: acceptable  Post anesthesia nausea and vomiting:  none      Vitals Value Taken Time   /69 12/9/2019  6:00 PM   Temp 36.4 °C (97.5 °F) 12/9/2019  6:00 PM   Pulse 65 12/9/2019  6:09 PM   Resp 12 12/9/2019  6:09 PM   SpO2 89 % 12/9/2019  6:09 PM   Vitals shown include unvalidated device data.

## 2019-12-09 NOTE — BRIEF OP NOTE
BRIEF OPERATIVE NOTE    Date of Procedure: 12/9/2019   Preoperative Diagnosis: Spinal stenosis, lumbar region with neurogenic claudication [M48.062]  Postoperative Diagnosis: Spinal stenosis, lumbar region with neurogenic claudication [M48.062]    Procedure(s):  L4/5 LAMINECTOMY  Surgeon(s) and Role:     Hayden Hawkins MD - Primary         Surgical Assistant: 0    Surgical Staff:  Circ-1: Meche Ortega: Harmeet Major  Radiology Technician: Akanksha Lawton  Scrub Tech-1: Jenise Tanner  Surg Asst-1: Howard Terry  Surg Asst-Relief: Cici Wesley  Event Time In Time Out   Incision Start 1532    Incision Close       Anesthesia: General   Estimated Blood Loss: 25  Specimens: * No specimens in log *   Findings: severe stenosis   Complications: 0  Implants: * No implants in log *

## 2019-12-09 NOTE — PERIOP NOTES
TRANSFER - OUT REPORT:    Verbal report given to BHAVNA Pizarro on Esequiel Strong  being transferred to (unit) for routine post - op       Report consisted of patients Situation, Background, Assessment and   Recommendations(SBAR). Information from the following report(s) SBAR, Kardex, OR Summary, Procedure Summary, Intake/Output and Recent Results was reviewed with the receiving nurse. Lines:   Peripheral IV 12/09/19 Left Hand (Active)   Site Assessment Clean, dry, & intact 12/9/2019  4:52 PM   Phlebitis Assessment 0 12/9/2019  4:52 PM   Infiltration Assessment 0 12/9/2019  4:52 PM   Dressing Status Clean, dry, & intact 12/9/2019  4:52 PM   Dressing Type Tape;Transparent 12/9/2019  4:52 PM   Hub Color/Line Status Pink; Infusing 12/9/2019  4:52 PM      Visit Vitals  /69   Pulse (!) 59   Temp 97.5 °F (36.4 °C)   Resp 11   Ht 5' 10\" (1.778 m)   Wt 90.5 kg (199 lb 8 oz)   SpO2 94%   BMI 28.63 kg/m²     Opportunity for questions and clarification was provided.       Patient transported with:   O2 @ 3 liters  Registered Nurse  [facility]

## 2019-12-09 NOTE — INTERVAL H&P NOTE
H&P Update:  Nithya Nuñez was seen and examined. History and physical has been reviewed. The patient has been examined.  There have been no significant clinical changes since the completion of the originally dated History and Physical.

## 2019-12-10 ENCOUNTER — HOME HEALTH ADMISSION (OUTPATIENT)
Dept: HOME HEALTH SERVICES | Facility: HOME HEALTH | Age: 64
End: 2019-12-10
Payer: COMMERCIAL

## 2019-12-10 VITALS
WEIGHT: 199.5 LBS | HEIGHT: 70 IN | BODY MASS INDEX: 28.56 KG/M2 | DIASTOLIC BLOOD PRESSURE: 72 MMHG | TEMPERATURE: 97.6 F | OXYGEN SATURATION: 96 % | HEART RATE: 96 BPM | SYSTOLIC BLOOD PRESSURE: 116 MMHG | RESPIRATION RATE: 17 BRPM

## 2019-12-10 PROCEDURE — 97165 OT EVAL LOW COMPLEX 30 MIN: CPT

## 2019-12-10 PROCEDURE — 74011250636 HC RX REV CODE- 250/636: Performed by: ORTHOPAEDIC SURGERY

## 2019-12-10 PROCEDURE — 74011250637 HC RX REV CODE- 250/637: Performed by: ORTHOPAEDIC SURGERY

## 2019-12-10 PROCEDURE — 97535 SELF CARE MNGMENT TRAINING: CPT

## 2019-12-10 PROCEDURE — 99218 HC RM OBSERVATION: CPT

## 2019-12-10 PROCEDURE — 97161 PT EVAL LOW COMPLEX 20 MIN: CPT

## 2019-12-10 RX ORDER — OXYCODONE AND ACETAMINOPHEN 5; 325 MG/1; MG/1
1 TABLET ORAL
Qty: 20 TAB | Refills: 0 | Status: SHIPPED | OUTPATIENT
Start: 2019-12-10 | End: 2019-12-13

## 2019-12-10 RX ADMIN — TAMSULOSIN HYDROCHLORIDE 0.4 MG: 0.4 CAPSULE ORAL at 09:37

## 2019-12-10 RX ADMIN — CELECOXIB 200 MG: 100 CAPSULE ORAL at 09:37

## 2019-12-10 RX ADMIN — ATORVASTATIN CALCIUM 20 MG: 20 TABLET, FILM COATED ORAL at 09:37

## 2019-12-10 RX ADMIN — CEFAZOLIN 2 G: 10 INJECTION, POWDER, FOR SOLUTION INTRAVENOUS at 06:35

## 2019-12-10 RX ADMIN — PREGABALIN 225 MG: 75 CAPSULE ORAL at 09:37

## 2019-12-10 RX ADMIN — Medication 10 ML: at 06:35

## 2019-12-10 RX ADMIN — AMLODIPINE BESYLATE 5 MG: 5 TABLET ORAL at 09:37

## 2019-12-10 RX ADMIN — GABAPENTIN 800 MG: 400 CAPSULE ORAL at 09:37

## 2019-12-10 RX ADMIN — ACETAMINOPHEN 1000 MG: 500 TABLET ORAL at 06:35

## 2019-12-10 NOTE — OP NOTES
[address] [place] [address]   OPERATIVE REPORT    Name:  Nabila Romero  MR#:   [id number]  :  1955  ACCOUNT #:  [de-identified]  DATE OF SERVICE:  2019    PREOPERATIVE DIAGNOSIS:  Lumbar spinal stenosis with neurogenic claudication. POSTOPERATIVE DIAGNOSIS:  Lumbar spinal stenosis with neurogenic claudication. PROCEDURES PERFORMED:  L4-5 hemilaminectomy, medial facetectomy, foraminotomy, bilateral.    SURGEON:  Tarah Osman MD    ASSISTANT:   None. ANESTHESIA:  General endotracheal.    COMPLICATIONS:  None. SPECIMENS REMOVED:  None. IMPLANTS:  None. ESTIMATED BLOOD LOSS:  25 to 50 mL. FINDINGS:  The patient had severe central lateral recess stenosis and facet ligamentous hypertrophy. DESCRIPTION OF OPERATION:  Following induction of general endotracheal anesthesia, the patient was turned to prone position on a spinal frame. The patient was prepped and draped in usual fashion. Midline incision was made. Paramedian incision was made in the lumbodorsal fascia and subperiosteal dissection done at L4-5 bilaterally. C-arm image verified our surgical level. Care was taken to maintain midline integrity throughout the procedure. First on the right then on the left, a hemilaminotomy was done with resection of the medial aspect of the facet and hypertrophic ligamentum flavum. The neural elements of the traversing and exiting root were thoroughly decompressed as well as the central canal.  This was repeated on the patient's left hand side. Pledgeted Gelfoam and thrombin were placed over the laminectomy site. The nerve roots were free and mobile. Care was taken to maintain both as much facet integrity as possible as well as the midline osteoligamentous complex. The wound was copiously irrigated, there was no active bleeding. A deep drain placed. Vancomycin powder instilled for infection prophylaxis.   Lumbodorsal fascia closed with #1 Vicryl, subcutaneous tissues closed with 2-0 Vicryl, skin closed with a 4-0 Monocryl subcuticular suture and Dermabond. A sterile occlusive dressing was placed upon the wound. All counts were correct.       Uriel Fernandes MD      [id number]  D:  12/09/2019 16:42  T:  12/10/2019 2:36  JOB #:  [id number]

## 2019-12-10 NOTE — PROGRESS NOTES
PHYSICAL THERAPY EVALUATION AND DISCHARGE    Patient: Ismael Lorenzana (89 y.o. male)  Date: 12/10/2019  Primary Diagnosis: Spinal stenosis, lumbar region with neurogenic claudication [M48.062]  Spinal stenosis of lumbar region [M48.061]  Procedure(s) (LRB):  L4/5 LAMINECTOMY (N/A) 1 Day Post-Op   Precautions:   Spinal    PLOF: Independent with mobility including gait no AD. ASSESSMENT :  PT orders received and patient cleared by nursing to participate with therapy. Patient is a 59 y.o. male admitted to the hospital due to s/p L4/5 laminectomy Dr. Tess Rubio 12/9/19. Patient consents to PT evaluation and treatment. Pt educated on safety, mobility, therex, spine precautions, log rolling technique, and OOB 3-5 times with nursing assistance. Pt has functional B LE strength and good sitting/standing balance. Pt is supervision/modified independent with bed mobility, transfers, and gait. Pt educated on safety while in the hospital and at home. Pt ended therapy sitting in chair with all needs met. Patient does not require further skilled physical therapy at this level of care. Pt is cleared for safe d/c home, nursing informed. PLAN :  Recommendations and Planned Interventions:    No skilled inpatient physical therapy needs identified at this time. Discharge Recommendations: Home Health  Further Equipment Recommendations for Discharge: N/A     SUBJECTIVE:   Patient stated I've been waiting for you. 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 Gala Lanefer 11/00    Fib-4 was 0.54 from 10/14; US 2/18 showed fatty liver and hepatomegaly    GERD (gastroesophageal reflux disease)     and HH on UGI 11/00    Hip bursitis     Dr Doc Riggs left 2015    Hyperlipidemia     calculated 10 yr risk score 15.2% (2/19)    Hypertension     Prediabetes 2011     Renal cysts, acquired, bilateral 02/2018    Us showed 1.4cm cyst right, 7 cm and 2.5 cm cysts on left    Rhinophyma     S/P cardiac cath 2000    nl, EF 60% Dr. Sima Willson    Umbilical hernia      Past Surgical History:   Procedure Laterality Date    CARDIAC SURG PROCEDURE UNLIST  02/2019    echo showed milc conc lvh, ef 65%, no wma abn, nl valves    CARDIAC SURG PROCEDURE UNLIST  02/2019    CTA chest showed atherosclerosis RCA and LAD    HX COLONOSCOPY      Dr. Michael Vera 10/10 diverticulosis    HX HERNIA REPAIR  02/2019    Dr Kathy Waldrop umblical hernia repair    Raquel Stein  2012    Dr. Madan Ding left distal clavicle excision and acromioplasty     Barriers to Learning/Limitations: None  Compensate with: N/A  Home Situation:   Home Situation  Home Environment: Private residence  # Steps to Enter: 3  Rails to Enter: Yes  Hand Rails : Bilateral  One/Two Story Residence: One story  Living Alone: No  Support Systems: Family member(s), Spouse/Significant Other/Partner  Patient Expects to be Discharged to[de-identified] Private residence  Current DME Used/Available at Home: Cane, straight, Raised toilet seat, Grab bars, Shower chair, Walker, rolling, Walker, rollator  Critical Behavior:  Neurologic State: Alert  Orientation Level: Oriented X4  Cognition: Follows commands  Safety/Judgement: Fall prevention  Psychosocial  Patient Behaviors: Calm; Cooperative  Family  Behaviors: Calm; Cooperative  Purposeful Interaction: Yes  Pt Identified Daily Priority: Clinical issues (comment)  Skin Condition/Temp: Warm;Dry  Family  Behaviors: Calm; Cooperative  Skin Integrity: Incision (comment)  Skin Integumentary  Skin Color: Appropriate for ethnicity  Skin Condition/Temp: Warm;Dry  Skin Integrity: Incision (comment)  Turgor: (no s/s of infection)     B LE Strength:    Strength: Within functional limits              B LE Tone & Sensation:   Tone: Normal          Sensation: Intact           B LE Range Of Motion:  AROM: Within functional limits                 Posture:  Posture (WDL): Exceptions to WDL  Posture Assessment: Forward head  Functional Mobility:  Bed Mobility:  Rolling: Supervision  Supine to Sit: Supervision     Scooting: Supervision  Transfers:  Sit to Stand: Supervision;Modified independent  Stand to Sit: Modified independent                       Balance:   Sitting: Intact  Standing: Impaired; Without support  Standing - Static: Good  Standing - Dynamic : Good;Fair    Ambulation/Gait Training:  Distance (ft): 250 Feet (ft)  Assistive Device: Walker, rolling  Ambulation - Level of Assistance: Supervision;Modified independent  Speed/Arelis: Pace decreased (<100 feet/min)  Step Length: Right shortened;Left shortened    Stairs:  Number of Stairs Trained: 4  Stairs - Level of Assistance: Supervision;Modified independent  Rail Use: Both     Therapeutic Exercises:   Reviewed and performed ankle pumps to increase blood flow and circulation. Pain:  Mild back pain throughout session. Pain Intervention(s): Medication (see MAR); Rest, Ice, Repositioning   Response to intervention: Nurse notified, See doc flow    Activity Tolerance:   good  Please refer to the flowsheet for vital signs taken during this treatment. After treatment:   [x]         Patient left in no apparent distress sitting up in chair  []         Patient left in no apparent distress in bed  [x]         Call bell left within reach  [x]   Personal items in reach  [x]         Nursing notified Geraldene Fabry  [x]         Caregiver present  []         Bed/chair alarm activated  []         SCDs applied       COMMUNICATION/EDUCATION:   [x]         Role of Physical Therapy in the acute care setting.   [x]         Fall prevention education was provided and the patient/caregiver indicated understanding. [x]         Patient/family have participated as able in goal setting and plan of care. [x]         Patient/family agree to work toward stated goals and plan of care. []         Patient understands intent and goals of therapy, but is neutral about his/her participation. []         Patient is unable to participate in goal setting/plan of care: ongoing with therapy staff. [x]         Out of bed with nursing assistance 3-5 times a day. []         Other:     Thank you for this referral.  Lashawn Alt, PT, DPT   Time Calculation: 14 mins      Eval Complexity: History: MEDIUM  Complexity : 1-2 comorbidities / personal factors will impact the outcome/ POC Exam:HIGH Complexity : 4+ Standardized tests and measures addressing body structure, function, activity limitation and / or participation in recreation  Presentation: LOW Complexity : Stable, uncomplicated  Clinical Decision Making:Low Complexity    Overall Complexity:LOW

## 2019-12-10 NOTE — PROGRESS NOTES
The Plan for Transition of Care is related to the following treatment goals: Home with home health. [facility])    The patient representative (wife) was provided with a choice of provider and agrees with the discharge plan. [x] Yes [] No    Freedom of choice list was provided with basic dialogue that supports the patient's individualized plan of care/goals, treatment preferences and shares the quality data associated with the providers. [x] Yes [] No    Patient will discharge home today (12/10/2019) with home health services through [facility]. Patient's family will transport home at the time of discharge. This writer will monitor for discharge planning to ensure a safe discharge home from [place]. July Schmidt  MSW  Care Manager  Pager#: [phone]

## 2019-12-10 NOTE — PROGRESS NOTES
Problem: Self Care Deficits Care Plan (Adult)  Goal: *Acute Goals and Plan of Care (Insert Text)  Outcome: Resolved/Met     OCCUPATIONAL THERAPY EVALUATION/DISCHARGE    Patient: Yesenia Sharif (33 y.o. male)  Date: 12/10/2019  Primary Diagnosis: Spinal stenosis, lumbar region with neurogenic claudication [M48.062]  Spinal stenosis of lumbar region [M48.061]  Procedure(s) (LRB):  L4/5 LAMINECTOMY (N/A) 1 Day Post-Op   Precautions: Fall, Spinal  PLOF: Patient needed assist with self-care and was independent with functional mobility PTA. ASSESSMENT AND RECOMMENDATIONS:  Pt cleared to participate in OT evaluation by RN. Upon entering the room, the pt was seated in chair, alert, and agreeable to participate in OT evaluation with wife present. Based on the objective data described below, the patient presents Mod (I) to (I) in basic self-care/ADL tasks. Patient is able to perform basic self care tasks without assistance using AE (reacher, sock aid, long handled-sponge) given after demonstration and practice. Back precautions reviewed and patient verbalized and demonstrated understanding during dressing. The pt presents with good static standing and fair+ dynamic standing balance, however will defer to PT for functional balance and functional mobility tasks. At this time, pt with no deficits that impede pt function with self-care, functional mobility, or functional transfers in preporation for ADL tasks. Patient is safe to d/c home with family support. No further skilled OT needed. OT to d/c from caseload. Skilled occupational therapy is not indicated at this time.   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 Chidi Monge liver 11/00    Fib-4 was 0.54 from 10/14; US 2/18 showed fatty liver and hepatomegaly    GERD (gastroesophageal reflux disease)     and HH on UGI 11/00    Hip bursitis     Dr Anthony Isabel left 2015    Hyperlipidemia     calculated 10 yr risk score 15.2% (2/19)    Hypertension     Prediabetes 2011     Renal cysts, acquired, bilateral 02/2018    Us showed 1.4cm cyst right, 7 cm and 2.5 cm cysts on left    Rhinophyma     S/P cardiac cath 2000    nl, EF 60% Dr. Lee Ann Peters    Umbilical hernia      Past Surgical History:   Procedure Laterality Date    CARDIAC SURG PROCEDURE UNLIST  02/2019    echo showed milc conc lvh, ef 65%, no wma abn, nl valves    CARDIAC SURG PROCEDURE UNLIST  02/2019    CTA chest showed atherosclerosis RCA and LAD    HX COLONOSCOPY      Dr. Karma Fonseca 10/10 diverticulosis    HX HERNIA REPAIR  02/2019    Dr Re Fuller umblical hernia repair    Amy Grahortensia  2012    Dr. Franc Singh left distal clavicle excision and acromioplasty     Barriers to Learning/Limitations: None  Compensate with: visual, verbal, tactile, kinesthetic cues/model    Home Situation:   Home Situation  Home Environment: Private residence  # Steps to Enter: 3  Rails to Enter: Yes  Hand Rails : Bilateral  One/Two Story Residence: One story  Living Alone: No  Support Systems: Family member(s), Spouse/Significant Other/Partner  Patient Expects to be Discharged to[de-identified] Private residence  Current DME Used/Available at Home: Cane, straight, Raised toilet seat, Grab bars, Shower chair, Walker, rolling, Walker, rollator  Tub or Shower Type: Tub/Shower combination  [x]     Right hand dominant   []     Left hand dominant    Cognitive/Behavioral Status:  Neurologic State: Alert  Orientation Level: Oriented X4  Cognition: Follows commands  Safety/Judgement: Fall prevention    Skin: Intact  Edema: None noted    Vision/Perceptual:    Acuity: Within Defined Limits      Coordination: BUE  Fine Motor Skills-Upper: Left Intact; Right Intact    Gross Motor Skills-Upper: Left Intact; Right Intact    Balance:  Sitting: Intact  Standing: Impaired; With support  Standing - Static: Good  Standing - Dynamic : Good;Fair    Strength: BUE  Strength: Within functional limits    Tone & Sensation: BUE  Tone: Normal  Sensation: Intact    Range of Motion: BUE  AROM: Within functional limits    Functional Mobility and Transfers for ADLs:  Bed Mobility:  Rolling: Supervision  Supine to Sit: Supervision   Scooting: Supervision    Transfers:  Sit to Stand: Supervision  Stand to Sit: Modified independent      ADL Assessment:  Feeding: Independent    Oral Facial Hygiene/Grooming: Independent    Bathing: Modified independent; Adaptive equipment    Upper Body Dressing: Independent    Lower Body Dressing: Modified independent; Adaptive equipment    Toileting: Independent    ADL Intervention:  Upper Body Dressing Assistance  Dressing Assistance: Independent  Pullover Shirt: Independent    Lower Body Dressing Assistance  Dressing Assistance: Modified independent  Underpants: Modified independent  Pants With Elastic Waist: Modified independent  Socks: Modified independent  Position Performed: Seated in chair;Standing  Adaptive Equipment Used: Reacher;Sock aid    Cognitive Retraining  Safety/Judgement: Fall prevention    Pain:  Pain level pre-treatment: 0/10   Pain level post-treatment: 0/10   Pain Intervention(s): Medication (see MAR); Response to intervention: See doc flow    Activity Tolerance:   Good    Please refer to the flowsheet for vital signs taken during this treatment.   After treatment:   [x]  Patient left in no apparent distress sitting up in chair  []  Patient left in no apparent distress in bed  [x]  Call bell left within reach  [x]  Nursing notified  []  Caregiver present  []  Bed alarm activated    COMMUNICATION/EDUCATION:   [x]      Role of Occupational Therapy in the acute care setting  [x]      Home safety education was provided and the patient/caregiver indicated understanding. [x]      Patient/family have participated as able and agree with findings and recommendations. []      Patient is unable to participate in plan of care at this time. Thank you for this referral.  Lalo Chaparro OTR/L  Time Calculation: 28 mins      Eval Complexity: History: LOW Complexity : Brief history review ; Examination: LOW Complexity : 1-3 performance deficits relating to physical, cognitive , or psychosocial skils that result in activity limitations and / or participation restrictions ;    Decision Making:LOW Complexity : No comorbidities that affect functional and no verbal or physical assistance needed to complete eval tasks

## 2019-12-10 NOTE — ROUTINE PROCESS
TRANSFER - IN REPORT:    Verbal report received from BHAVNA Dickey(name) on Gayle Moncada  being received from [facility]) for routine post - op      Report consisted of patients Situation, Background, Assessment and   Recommendations(SBAR). Information from the following report(s) SBAR, Kardex and MAR was reviewed with the receiving nurse. Opportunity for questions and clarification was provided. Assessment completed upon patients arrival to unit and care assumed.

## 2019-12-10 NOTE — PROGRESS NOTES
12/9/2019  20:40  Patient ambulated to bathroom x1 assist with walker; notes increased dizziness over length of time up. Returned to bed with patient complaint of increased dizziness. BP assessed WNL upon returning to bed; Will obtain BP, lying, sitting and standing upon next ambulation to bathroom. Of note, patient states pain in lower legs, back pain experienced prior to surgery not present. Denies numbness to legs. Only has reported pain at surgical/incisional site.

## 2019-12-10 NOTE — ROUTINE PROCESS
07:30 Am    End of Shift Note     Bedside and verbal shift change report given to BHAVNA Ward (On coming nurse) by Marlon Morales RN (Off going nurse). Report included the following information:      --Procedure Summary     --MAR,     --Recent Results     --Med Rec Status    SBAR Recommendations:     Issues for Provider to address:           Activity This Shift     [] Bed Rest Order   [] Refused   [] Dangled    [] TDWB         Ambulating:     [x] Bathroom     [] BSC     [x] Room/Hallway      Up in Chair for meals    []Yes [] No   Voiding       [x] Yes  [] No  Pineda          [] Yes  [] No  Incontinent [] Yes  [] No    DUE TO VOID POUR        [] Yes [] No  Purewick    [] Yes [] No  New Onset [] Yes [] No Straight Cath   []Yes  [] No  Condom Cath  [] Yes [] No  MD Called      [] Yes  [] No   Blood Sugars Managed []Yes [x] No    Bowels Moved [] Yes [x] No    Incontinent     [] Yes [] No Passed Gas [x]Yes [] No    New Onset  []Yes [] No        MD Called []Yes  [] No     CHG Bath Done     Before Surgery     After Surgery      [] Yes  [x] No  [] Yes  [x] No       Drain Removed [] Yes  [x] No [] N/A    Dressing Changed [] Yes   [x] No [] N/A      Nausea/Vomiting [] Yes   [x] No     Ice Packs Changed [x] Yes   [] No  [] N/A    Incentive Spirometer  [x] Yes  [] No      SCD Pumps On     Ankle Pumping  [x] Yes   [] No      [x] Yes   [] No        Telemetry Monitoring [] Yes   [x] No   Rhythm

## 2019-12-10 NOTE — PROGRESS NOTES
Lashanda Orlando post spine rounding. Patient educated: Activity:  OOB for all meals, walk every hour to prevent blood clots  Follow back precautions (no bending, twisting, lifting &  Log roll in/out of bed). VTE prophylaxis:   Use SCD pumps except when walking. Ankle pumps 10 times an hour at hospital & home. Pain Control:  Pain medications side effects discussed. Wean off narcotics ASAP. Use Tylenol ( 3000 mg/24 hours) , ice, distraction, moving, & change position to help with pain. Don't get nauseated. Eat a snack before taking pain medication    Do not get constipated: take stool softener/mild laxative daily while on narcotics. Incentive Spirometry:    Use of incentive spirometer 10 x/hr. Demonstration  2000 ml x 3  Wound Care: Dressing dry and intact  Educated on how to manage their dressing and/or incision once at home per their MD protocol. Keep dressing and or/incision clean and dry per MD instructions. No lotions, powders, creams to surgical site. Diet:   Eat for healing. Protein heals bone/muscle. Drink 8 glasses of water a day. Patient Safety:   Call light & belongings in reach. Call for help when want to walk or get OOB. Educational material given. Patient agreed to continue doing everything at home to prevent complications and have a successful recovery. Patient verbalizing the importance of using the incentive spirometer, ankle pumping, walking frequently, how to manage their wound, taking medication to prevent constipation and following their back precautions. Patient and wife verbalized understand. Given the opportunity for asking questions.

## 2019-12-10 NOTE — PROGRESS NOTES
OT order received and chart reviewed. Patient was seen for skilled OT and is safe for d/c home when medically stable. Full note to follow.        Thank you for this referral   Alexei Barraza MS, OTR/L

## 2019-12-10 NOTE — ROUTINE PROCESS
Bedside and Verbal shift change report given to Marlon Morales RN (oncoming nurse) by Kelly Randle RN (offgoing nurse). Report included the following information SBAR, Kardex, OR Summary and MAR.

## 2019-12-10 NOTE — PROGRESS NOTES
conducted an initial consultation and Spiritual Assessment for Lizet Rueda, who is a 59 y.o.,male. Patient's Primary Language is: [place]. According to the patient's EMR Hinduism Affiliation is: No Buddhist. The reason the Patient came to the hospital is:   Patient Active Problem List    Diagnosis Date Noted    Spinal stenosis of lumbar region 12/09/2019    Coronary artery disease involving native coronary artery of native heart without angina pectoris 12/02/2019    Renal cysts, acquired, bilateral 02/12/2019    Degenerative arthritis of lumbar spine 02/12/2019    Hyperlipidemia 01/20/2018    Primary hypertension 01/20/2018    Arthritis Dr Luciano Urbano 12/22/2015    GERD without esophagitis 12/22/2015    Diverticulosis of colon without hemorrhage 12/22/2015    Prediabetes 2011 03/13/2012        The  provided the following Interventions:  Initiated a relationship of care and support. Explored issues of maye, belief, spirituality and Scientologist/ritual needs while hospitalized. Listened empathically. Provided chaplaincy education. Provided information about Spiritual Care Services. Offered prayer and assurance of continued prayers on patient's behalf. Chart reviewed. The following outcomes where achieved:  Patient shared limited information about both their medical narrative and spiritual journey/beliefs.  confirmed Patient's Hinduism Affiliation. Patient processed feeling about current hospitalization. Patient expressed gratitude for 's visit. Assessment:  Patient does not have any Scientologist/cultural needs that will affect patient's preferences in health care. There are no spiritual or Scientologist issues which require intervention at this time. Plan:  Chaplains will continue to follow and will provide pastoral care on an as needed/requested basis.    recommends bedside caregivers page  on duty if patient shows signs of acute spiritual or emotional distress.     [id number] [address]   [phone]

## 2019-12-10 NOTE — HOME CARE
Received [facility] referral, Discharge order noted for today , [facility] will follow for SN,PT ,Little protocol , pt already has RW,W/C, bath seat and bars on toilet. SALTY MOTA.

## 2019-12-10 NOTE — DISCHARGE INSTRUCTIONS
Post-Operative Discharge Instructions [facility]  [phone]      At your 2-week post-operative visit we will remove any skin staples or sutures, if present. (Appointment was made at the time you scheduled your surgery)    Call office or physician on-call for any of the following:  · Fever greater than 100.5  · Uncontrollable chills  · Drainage from incision  · Pain not controlled by pain medication  · New pain  · New weakness or progressive weakness  · Food sticking in throat or excessive coughing when swallowing    Stop all anti-inflammatories (arthritis medication) such as Ibuprofen, Motrin, Aleve, Voltaren, Relafen, Naproxen, Arthrotec, etc. for 12 weeks ONLY if you had a neck or back Fusion. Use ice to your back to help with pain. May remove INGRIS stockings when discharged from the hospital.    May shower on the third day after surgery. Leave dressing on while you shower; the dressing is waterproof as long as the edges are sealed. Change dressing after 1 week, or sooner if saturated with drainage. Replace with a gauze dressing. No driving until your first post-operative visit. If you must drive, do not take pain medications that may alter your abilities. Lumbar braces and neck collars are for comfort only. Walking is the best therapy after back surgery. Avoid extremes of bending, twisting, or lifting. All post-operative surgical patients should function within the range of the pain. \"If it hurts - do not do it. \"    Follow your back precautions: No bending, lifting or twisting. Make sure to log roll in and out of bed. Best of luck with your back recovery. [address] YOU for choosing the [facility] for you Spine Surgery.

## 2019-12-11 ENCOUNTER — HOME CARE VISIT (OUTPATIENT)
Dept: SCHEDULING | Facility: HOME HEALTH | Age: 64
End: 2019-12-11
Payer: COMMERCIAL

## 2019-12-11 VITALS
DIASTOLIC BLOOD PRESSURE: 84 MMHG | TEMPERATURE: 97.4 F | SYSTOLIC BLOOD PRESSURE: 148 MMHG | OXYGEN SATURATION: 93 % | HEART RATE: 52 BPM

## 2019-12-11 PROCEDURE — 400013 HH SOC

## 2019-12-11 PROCEDURE — G0151 HHCP-SERV OF PT,EA 15 MIN: HCPCS

## 2019-12-12 ENCOUNTER — HOME CARE VISIT (OUTPATIENT)
Dept: HOME HEALTH SERVICES | Facility: HOME HEALTH | Age: 64
End: 2019-12-12
Payer: COMMERCIAL

## 2019-12-12 NOTE — PROGRESS NOTES
Patient being seen today for transitional care management    Patient was admitted to [facility] from 12/9-11/19              Initial interactive contact was done by nurse navigator     I thoroughly reviewed the discharge summary, notes, consults, labs and imaging studies in the electronic record. Pertinent details are summarized below and the record has been updated to reflect recent events    He was having back with with neurogenic claudication and successfully underwent L4-5 hemilaminectomy, medial facetectomy, foraminotomy, bilateral per Dr Juan Cobian. Doing well since dc, he has f/u w the NP later this week. Pain is 0/10 when sitting still, some residual 2-3/10 when walking, the leg pains are gone.   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 Jarred Arguelles liver 11/00    Fib-4 was 0.54 from 10/14; US 2/18 showed fatty liver and hepatomegaly    GERD (gastroesophageal reflux disease)     and HH on UGI 11/00    Hip bursitis     Dr Doroteo Jackman left 2015    Hyperlipidemia     calculated 10 yr risk score 15.2% (2/19)    Hypertension     Prediabetes 2011     Renal cysts, acquired, bilateral 02/2018    Us showed 1.4cm cyst right, 7 cm and 2.5 cm cysts on left    Rhinophyma     S/P cardiac cath 2000    nl, EF 60% Dr. Rhys Fernandez Umbilical hernia      Past Surgical History:   Procedure Laterality Date  CARDIAC SURG PROCEDURE UNLIST  2019    echo showed milc conc lvh, ef 65%, no wma abn, nl valves    CARDIAC SURG PROCEDURE UNLIST  2019    CTA chest showed atherosclerosis RCA and LAD    HX COLONOSCOPY      Dr. Steven Rowe 10/10 diverticulosis    HX HERNIA REPAIR  2019    Dr Chanel Pinedo umblical hernia repair   [facility] ORTHOP[facility]EDIC      Dr. 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: Not on file    Food insecurity:     Worry: Not on file     Inability: Not on file   [place] needs:     Medical: Not on file     Non-medical: Not on file   Tobacco Use    Smoking status: Former Smoker     Last attempt to quit: 2005     Years since quittin.9    Smokeless tobacco: Never Used    Tobacco comment: 60+ py   Substance and Sexual Activity    Alcohol use: No    Drug use: No    Sexual activity: Not on file   Lifestyle    Physical activity:     Days per week: Not on file     Minutes per session: Not on file    Stress: Not on file   Relationships    Social connections:     Talks on phone: Not on file     Gets together: Not on file     Attends Yarsani service: Not on file     Active member of club or organization: Not on file     Attends meetings of clubs or organizations: Not on file     Relationship status: Not on file    Intimate partner violence:     Fear of current or ex partner: Not on file     Emotionally abused: Not on file     Physically abused: Not on file     Forced sexual activity: Not on file   Other Topics Concern    Not on file   Social History Narrative    Not on file     Current Outpatient Medications   Medication Sig    atorvastatin (LIPITOR) 20 mg tablet Take 1 Tab by mouth daily.  omeprazole (PRILOSEC) 40 mg capsule TAKE ONE CAPSULE BY MOUTH DAILY    celecoxib (CELEBREX) 200 mg capsule TAKE ONE CAPSULE BY MOUTH DAILY    amLODIPine (NORVASC) 5 mg tablet TAKE ONE TABLET BY MOUTH DAILY    acetaminophen (TYLENOL EXTRA STRENGTH) 500 mg tablet Take 1,000 mg by mouth every six (6) hours as needed for Pain.  cholecalciferol (VITAMIN D3) 1,000 unit tablet Take 2,500 Units by mouth daily.  ascorbic acid (VITAMIN C) 500 mg tablet Take  by mouth daily.  multivitamin (ONE A DAY) tablet Take 1 Tab by mouth daily.  cyanocobalamin 1,000 mcg tablet Take 1,000 mcg by mouth daily.  gabapentin (NEURONTIN) 800 mg tablet Take 800 mg by mouth four (4) times daily.  pregabalin (LYRICA) 225 mg capsule Take 1 Cap by mouth two (2) times a day. Max Daily Amount: 450 mg.    amitriptyline (ELAVIL) 25 mg tablet Take 3 Tabs by mouth nightly.  Omega-3 Fatty Acids (FISH OIL) 500 mg cap Take 500 mg by mouth two (2) times a day.  zinc-colostrum-egg-koy005-ltcw 15 mg-50 mg- 1 mg-1 mg cap Take  by mouth. No current facility-administered medications for this visit. Allergies   Allergen Reactions    Cymbalta [Duloxetine] Other (comments)     Felt funny     Visit Vitals  /85   Pulse (!) 59   Temp 97.7 °F (36.5 °C) (Oral)   Resp 14   Ht 5' 10\" (1.778 m)   Wt 198 lb (89.8 kg)   SpO2 96%   BMI 28.41 kg/m²   A&O x3  Affect is appropriate. Mood stable  No apparent distress  Anicteric, no JVD, adenopathy or thyromegaly. No carotid bruits or radiated murmur  Lungs clear to auscultation, no wheezes or rales  Heart showed regular rate and rhythm. No murmur, rubs, gallops  Abdomen soft nontender, no hepatosplenomegaly or masses. Extremities without edema. Pulses 1-2+ symmetrically  Wound site looks clean, no tenderness, redness, dehiscence, some bruising in the mid buttock areas    Assessment and plan:  1. S/p l spine surgery. F/U Dr Yvette Drake group as scheduled  2. HLP.  F/U 3/20 for RPE with labs        Above conditions discussed at length and patient vocalized understanding.   All questions answered to patient satisfaction

## 2019-12-12 NOTE — DISCHARGE SUMMARY
Discharge  Summary     Patient: Ivan Lisa MRN: [id number]  SSN: [id number]    YOB: 1955  Age: 59 y.o.   Sex: male       Admit Date: 12/9/2019    Discharge Date: 12/11/2019      Admission Diagnoses: Spinal stenosis, lumbar region with neurogenic claudication [M48.062]  Spinal stenosis of lumbar region [M48.061]    Discharge Diagnoses:   Problem List as of 12/10/2019 Date Reviewed: 12/9/2019          Codes Class Noted - Resolved    Spinal stenosis of lumbar region ICD-10-CM: M48.061  ICD-9-CM: 724.02  12/9/2019 - Present        Coronary artery disease involving native coronary artery of native heart without angina pectoris ICD-10-CM: I25.10  ICD-9-CM: 414.01  12/2/2019 - Present    Overview Signed 12/2/2019  5:39 PM by Leticia Garcia MD     CTA 2/19             Renal cysts, acquired, bilateral ICD-10-CM: N28.1  ICD-9-CM: 593.2  2/12/2019 - Present        Degenerative arthritis of lumbar spine ICD-10-CM: M47.816  ICD-9-CM: 721.3  2/12/2019 - Present        Hyperlipidemia ICD-10-CM: E78.5  ICD-9-CM: 272.4  1/20/2018 - Present        Primary hypertension ICD-10-CM: I10  ICD-9-CM: 401.9  1/20/2018 - Present        Arthritis Dr Joel Turner: M19.90  ICD-9-CM: 716.90  12/22/2015 - Present        GERD without esophagitis ICD-10-CM: K21.9  ICD-9-CM: 530.81  12/22/2015 - Present        Diverticulosis of colon without hemorrhage ICD-10-CM: K57.30  ICD-9-CM: 562.10  12/22/2015 - Present        Prediabetes 2011 ICD-10-CM: R73.03  ICD-9-CM: 790.29  3/13/2012 - Present        RESOLVED: Dyspnea on exertion ICD-10-CM: R06.09  ICD-9-CM: 786.09  2/25/2019 - 12/2/2019        RESOLVED: Intermittent chest pain ICD-10-CM: R07.9  ICD-9-CM: 786.50  2/25/2019 - 12/2/2019        RESOLVED: Elevated blood pressure reading with diagnosis of hypertension ICD-10-CM: I10  ICD-9-CM: 401.9  2/25/2019 - 12/2/2019        RESOLVED: Acute bronchitis ICD-10-CM: J20.9  ICD-9-CM: 466.0  2/25/2019 - 12/2/2019 RESOLVED: Atypical chest pain ICD-10-CM: R07.89  ICD-9-CM: 786.59  2/25/2019 - 12/2/2019               Discharge Condition: Good    Procedure: L4-5 hemilaminectomy, medial facetectomy, foraminotomy, bilateral.    Hospital Course: Normal hospital course for this procedure. Tolerated surgical intervention well. VSS Throughout. Neuro INTACT . Incision dry and intact, tolerating PO intake, voiding adequately. Ambulatory     Disposition: home    Discharge Medications:      My Medications      TAKE these medications as instructed      Instructions Each Dose to Equal Morning Noon Evening Bedtime   amitriptyline 25 mg tablet  Commonly known as:  ELAVIL    Your last dose was: Your next dose is: Take 3 Tabs by mouth nightly. 75 mg                 amLODIPine 5 mg tablet  Commonly known as:  NORVASC    Your last dose was: Your next dose is:          TAKE ONE TABLET BY MOUTH DAILY                  ascorbic acid (vitamin C) 500 mg tablet  Commonly known as:  VITAMIN C    Your last dose was: Your next dose is: Take  by mouth daily. atorvastatin 20 mg tablet  Commonly known as:  LIPITOR    Your last dose was: Your next dose is: Take 1 Tab by mouth daily. 20 mg                 celecoxib 200 mg capsule  Commonly known as:  CELEBREX    Your last dose was: Your next dose is:          TAKE ONE CAPSULE BY MOUTH DAILY                  cyanocobalamin 1,000 mcg tablet    Your last dose was: Your next dose is: Take 1,000 mcg by mouth daily. 1,000 mcg                 gabapentin 800 mg tablet  Commonly known as:  NEURONTIN    Your last dose was: Your next dose is: Take 800 mg by mouth four (4) times daily. 800 mg                 multivitamin tablet  Commonly known as:  ONE A DAY    Your last dose was: Your next dose is: Take 1 Tab by mouth daily.    1 Tab                 Omega-3 Fatty Acids 500 mg Cap  Commonly known as: FISH OIL    Your last dose was: Your next dose is: Take 500 mg by mouth two (2) times a day. 500 mg                 omeprazole 40 mg capsule  Commonly known as:  PRILOSEC    Your last dose was: Your next dose is:          TAKE ONE CAPSULE BY MOUTH DAILY                  oxyCODONE-acetaminophen 5-325 mg per tablet  Commonly known as:  PERCOCET    Your last dose was: Your next dose is: Take 1 Tab by mouth every six (6) hours as needed for Pain for up to 3 days. Max Daily Amount: 4 Tabs. 1 Tab                 pregabalin 225 mg capsule  Commonly known as:  LYRICA    Your last dose was: Your next dose is: Take 1 Cap by mouth two (2) times a day. Max Daily Amount: 450 mg.   225 mg                 TYLENOL EXTRA STRENGTH 500 mg tablet  Generic drug:  acetaminophen    Your last dose was: Your next dose is: Take 1,000 mg by mouth every six (6) hours as needed for Pain.   1,000 mg                 VITAMIN D3 (1000 Units /25 mcg) tablet  Generic drug:  cholecalciferol    Your last dose was: Your next dose is: Take 2,500 Units by mouth daily. 2,500 Units                 zinc-colostrum-egg-ywo493-hehy 15 mg-50 mg- 1 mg-1 mg Cap    Your last dose was: Your next dose is: Take  by mouth. Where to Get Your Medications      These medications were sent to [address] [place] [address]    Phone:  [phone]   · oxyCODONE-acetaminophen 5-325 mg per tablet               Follow-up Appointments   Procedures    FOLLOW UP VISIT Appointment in: Two Weeks     Standing Status:   Standing     Number of Occurrences:   1     Order Specific Question:   Appointment in     Answer:    Two Weeks       Signed By: Cheryle Rhymes, NP     December 11, 2019

## 2019-12-13 ENCOUNTER — HOME CARE VISIT (OUTPATIENT)
Dept: HOME HEALTH SERVICES | Facility: HOME HEALTH | Age: 64
End: 2019-12-13
Payer: COMMERCIAL

## 2019-12-16 ENCOUNTER — OFFICE VISIT (OUTPATIENT)
Dept: INTERNAL MEDICINE CLINIC | Age: 64
End: 2019-12-16

## 2019-12-16 VITALS
HEART RATE: 59 BPM | SYSTOLIC BLOOD PRESSURE: 134 MMHG | TEMPERATURE: 97.7 F | BODY MASS INDEX: 28.35 KG/M2 | WEIGHT: 198 LBS | DIASTOLIC BLOOD PRESSURE: 85 MMHG | HEIGHT: 70 IN | OXYGEN SATURATION: 96 % | RESPIRATION RATE: 14 BRPM

## 2019-12-16 DIAGNOSIS — M48.062 SPINAL STENOSIS OF LUMBAR REGION WITH NEUROGENIC CLAUDICATION: Primary | ICD-10-CM

## 2019-12-16 NOTE — PROGRESS NOTES
Keshia Gómez presents today for   Chief Complaint   Patient presents with    Transitions Of Care     spinal stenosis of lumbar region              Depression Screening:  3 most recent PHQ Screens 11/15/2019   PHQ Not Done -   Little interest or pleasure in doing things Not at all   Feeling down, depressed, irritable, or hopeless Not at all   Total Score PHQ 2 0       Learning Assessment:  Learning Assessment 12/2/2019   PRIMARY LEARNER Patient   HIGHEST LEVEL OF EDUCATION - PRIMARY LEARNER  GRADUATED HIGH SCHOOL OR GED   BARRIERS PRIMARY LEARNER NONE   CO-LEARNER CAREGIVER No   CO-LEARNER NAME -   PRIMARY LANGUAGE ENGLISH    NEED -   LEARNER PREFERENCE PRIMARY READING   LEARNING SPECIAL TOPICS -   ANSWERED BY Zach Taylor   RELATIONSHIP SELF       Abuse Screening:  Abuse Screening Questionnaire 10/18/2016   Do you ever feel afraid of your partner? N   Are you in a relationship with someone who physically or mentally threatens you? N   Is it safe for you to go home? Y       Fall Risk  No flowsheet data found. Health Maintenance Due   Topic Date Due    Shingrix Vaccine Age 49> (1 of 2) 08/22/2005         Coordination of Care:  1. Have you been to the ER, urgent care clinic since your last visit? Hospitalized since your last visit? Yes 12/9 [facility]    2. Have you seen or consulted any other health care providers outside of the [address] [place] [address] since your last visit? Include any pap smears or colon screening.  no

## 2019-12-23 ENCOUNTER — OFFICE VISIT (OUTPATIENT)
Dept: ORTHOPEDIC SURGERY | Age: 64
End: 2019-12-23

## 2019-12-23 VITALS
SYSTOLIC BLOOD PRESSURE: 159 MMHG | RESPIRATION RATE: 17 BRPM | OXYGEN SATURATION: 99 % | HEIGHT: 70 IN | WEIGHT: 196 LBS | HEART RATE: 72 BPM | BODY MASS INDEX: 28.06 KG/M2 | DIASTOLIC BLOOD PRESSURE: 96 MMHG | TEMPERATURE: 97.6 F

## 2019-12-23 DIAGNOSIS — M48.062 SPINAL STENOSIS OF LUMBAR REGION WITH NEUROGENIC CLAUDICATION: Primary | ICD-10-CM

## 2019-12-23 DIAGNOSIS — Z98.890 S/P LUMBAR LAMINECTOMY: ICD-10-CM

## 2019-12-23 NOTE — PROGRESS NOTES
[address]  [address], [address]  [place]  Phone: [phone]  Fax: [phone]  PROGRESS NOTE-Post Op  Patient: Troy Heller                MRN: [id number]       SSN: [id number]  YOB: 1955        AGE: 59 y.o. SEX: male  Body mass index is 28.12 kg/m². PCP: Guero Chliders MD  12/23/19    Chief Complaint   Patient presents with    Back Pain     post op       HISTORY OF PRESENT ILLNESS:  Troy Heller is a 59 y.o. male with history of back and BLE pain who had a L4-5 bilateral decompression surgery 2 weeks ago for stenosis. Pain prior to surgery was in the BLE L4-5 equally bad in both legs from back to feets and described as aching, burning and numbing. Today, pain in BLE has resolved since surgery. His pain is now some incisional back pain. Patient denies any fevers, wound drainage, bladder/bowel dysfunction, new onset weakness, new neuropathic pain, or other neurological deficits. Pt desires to continue with evaluation of back pain and postoperative care. Current Medications: none     ASSESSMENT   Diagnoses and all orders for this visit:    1. Spinal stenosis of lumbar region with neurogenic claudication    2. S/P lumbar laminectomy         IMPRESSION AND PLAN:  This is a pt who had a lumbar decompression surgery 2 weeks ago. He is doing well with resolved leg pain. His incision is healing nicely. > Pt was given information on lumbar lami post-operative and wound care.  > Reviewed activity and wound care  > Mr. Nancy Barney has a reminder for a \"due or due soon\" health maintenance.  I have asked that he contact his primary care provider, Guero Childers MD, for follow-up on this health maintenance.  > We have informed the patient to notify us for immediate appointment if he has any worsening neurogical symptoms or if an emergency situation presents, then call 911  >  demonstrated consistency with prescribing.   > Pt will follow-up in 4 wks as scheduled. SUBJECTIVE    Smoking Status non smoker  Work retired    Pain Scale: 0 - No pain/10    Pain Assessment  12/23/2019   Location of Pain Back   Location Modifiers -   Severity of Pain 0   Quality of Pain -   Quality of Pain Comment -   Duration of Pain -   Frequency of Pain -   Aggravating Factors -   Aggravating Factors Comment -   Limiting Behavior -   Relieving Factors -   Relieving Factors Comment -   Result of Injury -           REVIEW OF SYSTEMS  Constitutional: Negative for fever, chills, or weight change. Respiratory: Negative for cough or shortness of breath. Cardiovascular: Negative for chest pain or palpitations. Gastrointestinal: Negative for incontinence, acid reflux, change in bowel habits, or constipation. Genitourinary: Negative for incontinence, dysuria and flank pain. Musculoskeletal: Positive for back pain. See HPI. Skin: Negative for rash. Neurological:no radiculopathy. See HPI. Endo/Heme/Allergies: Negative. Psychiatric/Behavioral: Negative. PHYSICAL EXAMINATION  Visit Vitals  BP (!) 159/96 (BP 1 Location: Left arm, BP Patient Position: Sitting)   Pulse 72   Temp 97.6 °F (36.4 °C) (Oral)   Resp 17   Ht 5' 10\" (1.778 m)   Wt 196 lb (88.9 kg)   SpO2 99%   BMI 28.12 kg/m²         Accompanied by spouse. Constitutional:  Well developed, well nourished, in no acute distress. Psychiatric: Affect and mood are appropriate. Integumentary: No rashes or abrasions noted on exposed areas. Wound: mid low back Incision healing well, no drainage, no erythema, no hernia, no seroma, no swelling, no dehiscence, incision well approximated. Cardiovascular/Peripheral Vascular: +2 radial & pedal pulses. No peripheral edema is noted. Lymphatic:  No evidence of lymphedema. No cervical lymphadenopathy. SPINE/MUSCULOSKELETAL EXAM    Lumbar spine:  No rash, ecchymosis, or gross obliquity. No fasciculations.  No focal atrophy is noted.  Range of motion is not tested with  . Tenderness to palpation none. No tenderness to palpation at the sciatic notch. Sensation grossly intact to light touch. Straight leg raise neg      MOTOR:     Hip Flex Quads Hamstrings Ankle DF EHL Ankle PF   Right 5/5 5/5 5/5 5/5 5/5 5/5   Left 5/5 5/5 5/5 5/5 5/5 5/5         normal gait and station    Ambulation without assistive device. FWB. 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 Roth Ran liver 11/00    Fib-4 was 0.54 from 10/14; US 2/18 showed fatty liver and hepatomegaly    GERD (gastroesophageal reflux disease)     and HH on UGI 11/00    Hip bursitis     Dr Jeet Fried left 2015    Hyperlipidemia     calculated 10 yr risk score 15.2% (2/19)    Hypertension     Prediabetes 2011     Renal cysts, acquired, bilateral 02/2018    Us showed 1.4cm cyst right, 7 cm and 2.5 cm cysts on left    Rhinophyma     S/P cardiac cath 2000    nl, EF 60% Dr. Ramon Cordero Umbilical hernia        Past Surgical History:   Procedure Laterality Date    CARDIAC SURG PROCEDURE UNLIST  02/2019    echo showed milc conc lvh, ef 65%, no wma abn, nl valves    CARDIAC SURG PROCEDURE UNLIST  02/2019    CTA chest showed atherosclerosis RCA and LAD    HX COLONOSCOPY      Dr. Roderick Guzmán 10/10 diverticulosis    HX HERNIA REPAIR  02/2019    Dr Kathy Avila umblical hernia repair    HX ORTHOPAEDIC  2012    Dr. Solomon Singh left distal clavicle excision and acromioplasty   .       MEDICATIONS      Current Outpatient Medications   Medication Sig Dispense Refill    atorvastatin (LIPITOR) 20 mg tablet Take 1 Tab by mouth daily. 30 Tab 5    omeprazole (PRILOSEC) 40 mg capsule TAKE ONE CAPSULE BY MOUTH DAILY 90 Cap 2    celecoxib (CELEBREX) 200 mg capsule TAKE ONE CAPSULE BY MOUTH DAILY 90 Cap 3    amLODIPine (NORVASC) 5 mg tablet TAKE ONE TABLET BY MOUTH DAILY 90 Tab 3    Omega-3 Fatty Acids (FISH OIL) 500 mg cap Take 500 mg by mouth two (2) times a day. 60 Cap 0    acetaminophen (TYLENOL EXTRA STRENGTH) 500 mg tablet Take 1,000 mg by mouth every six (6) hours as needed for Pain.  cholecalciferol (VITAMIN D3) 1,000 unit tablet Take 2,500 Units by mouth daily.  ascorbic acid (VITAMIN C) 500 mg tablet Take  by mouth daily.  multivitamin (ONE A DAY) tablet Take 1 Tab by mouth daily.  cyanocobalamin 1,000 mcg tablet Take 1,000 mcg by mouth daily.  gabapentin (NEURONTIN) 800 mg tablet Take 800 mg by mouth four (4) times daily.  pregabalin (LYRICA) 225 mg capsule Take 1 Cap by mouth two (2) times a day. Max Daily Amount: 450 mg. 60 Cap 2    amitriptyline (ELAVIL) 25 mg tablet Take 3 Tabs by mouth nightly. 90 Tab 2    zinc-colostrum-egg-dew220-jpuj 15 mg-50 mg- 1 mg-1 mg cap Take  by mouth.           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    Food insecurity:     Worry: Not on file     Inability: Not on file    Transportation needs:     Medical: Not on file     Non-medical: Not on file   Tobacco Use    Smoking status: Former Smoker     Last attempt to quit: 2005     Years since quittin.9    Smokeless tobacco: Never Used    Tobacco comment: 60+ py   Substance and Sexual Activity    Alcohol use: No    Drug use: No    Sexual activity: Not on file   Lifestyle    Physical activity:     Days per week: Not on file     Minutes per session: Not on file    Stress: Not on file   Relationships    Social connections:     Talks on phone: Not on file     Gets together: Not on file     Attends Hinduism service: Not on file     Active member of club or organization: Not on file     Attends meetings of clubs or organizations: Not on file     Relationship status: Not on file    Intimate partner violence:     Fear of current or ex partner: Not on file     Emotionally abused: Not on file     Physically abused: Not on file     Forced sexual activity: Not on file   Other Topics Concern    Not on file   Social History Narrative    Not on file       FAMILY HISTORY    Family History   Problem Relation Age of Onset    Other Father         cardiomyopathy    Heart Disease Brother          Gaston Eller NP

## 2020-01-14 ENCOUNTER — OFFICE VISIT (OUTPATIENT)
Dept: ORTHOPEDIC SURGERY | Age: 65
End: 2020-01-14

## 2020-01-14 VITALS
RESPIRATION RATE: 17 BRPM | BODY MASS INDEX: 27.77 KG/M2 | OXYGEN SATURATION: 99 % | DIASTOLIC BLOOD PRESSURE: 83 MMHG | HEART RATE: 65 BPM | WEIGHT: 194 LBS | TEMPERATURE: 98.1 F | HEIGHT: 70 IN | SYSTOLIC BLOOD PRESSURE: 151 MMHG

## 2020-01-14 DIAGNOSIS — M48.062 SPINAL STENOSIS OF LUMBAR REGION WITH NEUROGENIC CLAUDICATION: ICD-10-CM

## 2020-01-14 DIAGNOSIS — Z98.890 S/P LUMBAR LAMINECTOMY: Primary | ICD-10-CM

## 2020-01-14 NOTE — PROGRESS NOTES
[address]  [address], [address]  [place], [address] [place] [address] Street  Phone: [phone]  Fax: [phone]  PROGRESS NOTE  Patient: Char Clemons                MRN: [id number]       SSN: [id number]  YOB: 1955        AGE: 59 y.o. SEX: male  Body mass index is 27.84 kg/m². PCP: Chapin Meeks MD  01/14/20    Chief Complaint   Patient presents with    Back Pain     post op       HISTORY OF PRESENT ILLNESS, RADIOGRAPHS, and PLAN:     HISTORY OF PRESENT ILLNESS:  Mr. Nabil Bravo returns today. He is six weeks out from his bilateral L4-5 decompression for neurogenic claudication. He has had total resolution of his symptoms. He says he has no back or leg pain. He has re-entered an exercise program.      PHYSICAL EXAMINATION:  His physical exam demonstrates normal strength, sensation, and reflexes, no nerve tension signs, and a well healed wound. ASSESSMENT/PLAN: I am thrilled by his outcome. We will see him back in six weeks for routine followup or as needed. cc: Avery Ball M.D.          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 Melanie Baer liver 11/00    Fib-4 was 0.54 from 10/14; US 2/18 showed fatty liver and hepatomegaly    GERD (gastroesophageal reflux disease)     and HH on UGI 11/00    Hip bursitis     Dr Cande Camp left 2015    Hyperlipidemia     calculated 10 yr risk score 15.2% (2/19)    Hypertension     Prediabetes 2011     Renal cysts, acquired, bilateral 02/2018    Us showed 1.4cm cyst right, 7 cm and 2.5 cm cysts on left    Rhinophyma     S/P cardiac cath 2000    nl, EF 60% Dr. Aryan Gant Umbilical hernia        Family History   Problem Relation Age of Onset    Other Father         cardiomyopathy    Heart Disease Brother        Current Outpatient Medications   Medication Sig Dispense Refill    atorvastatin (LIPITOR) 20 mg tablet Take 1 Tab by mouth daily. 30 Tab 5    omeprazole (PRILOSEC) 40 mg capsule TAKE ONE CAPSULE BY MOUTH DAILY 90 Cap 2    amitriptyline (ELAVIL) 25 mg tablet Take 3 Tabs by mouth nightly. 90 Tab 2    celecoxib (CELEBREX) 200 mg capsule TAKE ONE CAPSULE BY MOUTH DAILY 90 Cap 3    amLODIPine (NORVASC) 5 mg tablet TAKE ONE TABLET BY MOUTH DAILY 90 Tab 3    Omega-3 Fatty Acids (FISH OIL) 500 mg cap Take 500 mg by mouth two (2) times a day. 60 Cap 0    acetaminophen (TYLENOL EXTRA STRENGTH) 500 mg tablet Take 1,000 mg by mouth every six (6) hours as needed for Pain.  cholecalciferol (VITAMIN D3) 1,000 unit tablet Take 2,500 Units by mouth daily.  ascorbic acid (VITAMIN C) 500 mg tablet Take  by mouth daily.  multivitamin (ONE A DAY) tablet Take 1 Tab by mouth daily.  cyanocobalamin 1,000 mcg tablet Take 1,000 mcg by mouth daily.  gabapentin (NEURONTIN) 800 mg tablet Take 800 mg by mouth four (4) times daily.  pregabalin (LYRICA) 225 mg capsule Take 1 Cap by mouth two (2) times a day. Max Daily Amount: 450 mg. 60 Cap 2    zinc-colostrum-egg-[id number] 15 mg-50 mg- 1 mg-1 mg cap Take  by mouth.          Allergies   Allergen Reactions    Cymbalta [Duloxetine] Other (comments)     Felt funny       Past Surgical History:   Procedure Laterality Date    CARDIAC SURG PROCEDURE UNLIST  02/2019    echo showed milc conc lvh, ef 65%, no wma abn, nl valves    CARDIAC SURG PROCEDURE UNLIST  02/2019    CTA chest showed atherosclerosis RCA and LAD    HX COLONOSCOPY      Dr. Lolis Owen 10/10 diverticulosis    HX HERNIA REPAIR  02/2019    Dr Claus Ge umblical hernia repair [facility] ORTHOPAEDIC  2012    Dr. 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 Fernandez Squibb liver 11/00    Fib-4 was 0.54 from 10/14; US 2/18 showed fatty liver and hepatomegaly    GERD (gastroesophageal reflux disease)     and HH on UGI 11/00    Hip bursitis     Dr Mono Pinto left 2015    Hyperlipidemia     calculated 10 yr risk score 15.2% (2/19)    Hypertension     Prediabetes 2011     Renal cysts, acquired, bilateral 02/2018    Us showed 1.4cm cyst right, 7 cm and 2.5 cm cysts on left    Rhinophyma     S/P cardiac cath 2000    nl, EF 60% Dr. 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    Food insecurity:     Worry: Not on file     Inability: Not on file   "[employer] needs:     Medical: Not on file     Non-medical: Not on file   Tobacco Use    Smoking status: Former Smoker     Last attempt to quit: 1/1/2005     Years since quitting: 15.0    Smokeless tobacco: Never Used    Tobacco comment: 60+ py   Substance and Sexual Activity    Alcohol use: No    Drug use: No    Sexual activity: Not on file   Lifestyle    Physical activity:     Days per week: Not on file     Minutes per session: Not on file    Stress: Not on file Relationships    Social connections:     Talks on phone: Not on file     Gets together: Not on file     Attends Holiness service: Not on file     Active member of club or organization: Not on file     Attends meetings of clubs or organizations: Not on file     Relationship status: Not on file    Intimate partner violence:     Fear of current or ex partner: Not on file     Emotionally abused: Not on file     Physically abused: Not on file     Forced sexual activity: Not on file   Other Topics Concern    Not on file   Social History Narrative    Not on file         REVIEW OF SYSTEMS:   CONSTITUTIONAL SYMPTOMS:  Negative. EYES:  Negative. EARS, NOSE, THROAT AND MOUTH:  Negative. CARDIOVASCULAR:  Negative. RESPIRATORY:  Negative. GENITOURINARY: Per HPI. GASTROINTESTINAL:  Per HPI. INTEGUMENTARY (SKIN AND/OR BREAST):  Negative. MUSCULOSKELETAL: Per HPI.   ENDOCRINE/RHEUMATOLOGIC:  Negative. NEUROLOGICAL:  Per HPI. HEMATOLOGIC/LYMPHATIC:  Negative. ALLERGIC/IMMUNOLOGIC:  Negative. PSYCHIATRIC:  Negative. PHYSICAL EXAMINATION:   Visit Vitals  /83 (BP 1 Location: Left arm, BP Patient Position: Sitting)   Pulse 65   Temp 98.1 °F (36.7 °C) (Oral)   Resp 17   Ht 5' 10\" (1.778 m)   Wt 194 lb (88 kg)   SpO2 99%   BMI 27.84 kg/m²    PAIN SCALE: 0 - No pain/10    CONSTITUTIONAL: The patient is in no apparent distress and is alert and oriented x 3. HEENT: Normocephalic. Hearing grossly intact. NECK: Supple and symmetric. no tenderness, or masses were felt. RESPIRATORY: No labored breathing. CARDIOVASCULAR: The carotid pulses were normal. Peripheral pulses were 2+. CHEST: Normal AP diameter and normal contour without any kyphoscoliosis. LYMPHATIC: No lymphadenopathy was appreciated in the neck, axillae or groin. SKIN:  Incision healing well, no drainage, no erythema, no hernia, no seroma, no swelling, no dehiscence, incision well approximated.  Negative for scars, rashes, lesions, or ulcers on the right upper, right lower, left upper, left lower and trunk. NEUROLOGICAL: Alert and oriented x 3. Ambulation without assistive device. FWB. EXTREMITIES: See musculoskeletal.  MUSCULOSKELETAL:   Head and Neck:  Negative for misalignment, asymmetry, crepitation, defects, tenderness masses or effusions.  Left Upper Extremity: Inspection, percussion and palpation performed. Tovars sign is negative.  Right Upper Extremity: Inspection, percussion and palpation performed. Tovars sign is negative.  Spine, Ribs and Pelvis: Inspection, percussion and palpation performed. Negative for misalignment, asymmetry, crepitation, defects, tenderness masses or effusions.  Left Lower Extremity: Inspection, percussion and palpation performed. Negative straight leg raise.  Right Lower Extremity: Inspection, percussion and palpation performed. Negative straight leg raise. SPINE EXAM:     Lumbar spine: No rash, ecchymosis, or gross obliquity. No focal atrophy is noted. ASSESSMENT    ICD-10-CM ICD-9-CM    1. S/P lumbar laminectomy Z98.890 V45.89    2. Spinal stenosis of lumbar region with neurogenic claudication M48.062 724.03        Written by Haily Conrad, as dictated by Raine Booker MD.    I, Dr. Raine Booker MD, confirm that all documentation is accurate.

## 2020-03-06 DIAGNOSIS — E78.5 HYPERLIPIDEMIA, UNSPECIFIED HYPERLIPIDEMIA TYPE: ICD-10-CM

## 2020-03-06 DIAGNOSIS — I25.10 CORONARY ARTERY DISEASE INVOLVING NATIVE CORONARY ARTERY OF NATIVE HEART WITHOUT ANGINA PECTORIS: ICD-10-CM

## 2020-03-09 RX ORDER — ATORVASTATIN CALCIUM 20 MG/1
TABLET, FILM COATED ORAL
Qty: 90 TAB | Refills: 4 | Status: SHIPPED | OUTPATIENT
Start: 2020-03-09 | End: 2021-05-24

## 2020-05-28 DIAGNOSIS — I10 PRIMARY HYPERTENSION: ICD-10-CM

## 2020-05-28 DIAGNOSIS — K21.9 GERD WITHOUT ESOPHAGITIS: ICD-10-CM

## 2020-05-28 RX ORDER — AMLODIPINE BESYLATE 5 MG/1
5 TABLET ORAL DAILY
Qty: 90 TAB | Refills: 3 | Status: SHIPPED | OUTPATIENT
Start: 2020-05-28 | End: 2021-05-24

## 2020-05-28 RX ORDER — OMEPRAZOLE 40 MG/1
40 CAPSULE, DELAYED RELEASE ORAL DAILY
Qty: 90 CAP | Refills: 3 | Status: SHIPPED | OUTPATIENT
Start: 2020-05-28 | End: 2020-06-01

## 2020-05-28 NOTE — TELEPHONE ENCOUNTER
Last Visit: 12/16/19 with MD Luis Fernando Chadwick  Next Appointment: none  Previous Refill Encounter(s): 10/4/19 Prilosec #90 with 2 refills, 7/23/19 Norvasc #90 with 3 refills    Requested Prescriptions     Pending Prescriptions Disp Refills    amLODIPine (NORVASC) 5 mg tablet 90 Tab 3     Sig: Take 1 Tab by mouth daily.  omeprazole (PRILOSEC) 40 mg capsule 90 Cap 3     Sig: Take 1 Cap by mouth daily.

## 2020-06-01 DIAGNOSIS — K21.9 GERD WITHOUT ESOPHAGITIS: ICD-10-CM

## 2020-06-01 RX ORDER — OMEPRAZOLE 40 MG/1
CAPSULE, DELAYED RELEASE ORAL
Qty: 16 CAP | Refills: 1 | Status: SHIPPED | OUTPATIENT
Start: 2020-06-01 | End: 2021-06-29 | Stop reason: SDUPTHER

## 2020-08-26 RX ORDER — CELECOXIB 200 MG/1
CAPSULE ORAL
Qty: 90 CAP | Refills: 2 | Status: SHIPPED | OUTPATIENT
Start: 2020-08-26 | End: 2021-05-24

## 2021-04-22 ENCOUNTER — TELEPHONE (OUTPATIENT)
Dept: INTERNAL MEDICINE CLINIC | Age: 66
End: 2021-04-22

## 2021-04-22 DIAGNOSIS — Z00.00 PHYSICAL EXAM: Primary | ICD-10-CM

## 2021-04-22 NOTE — TELEPHONE ENCOUNTER
Can you put lab orders in for this pt?  [facility]

## 2021-04-23 ENCOUNTER — HOSPITAL ENCOUNTER (OUTPATIENT)
Dept: LAB | Age: 66
Discharge: HOME | End: 2021-04-23
Payer: MEDICARE

## 2021-04-23 DIAGNOSIS — Z00.00 PHYSICAL EXAM: ICD-10-CM

## 2021-04-23 LAB
ALBUMIN SERPL-MCNC: 4 G/DL (ref 3.4–5)
ALBUMIN/GLOB SERPL: 1.4 {RATIO} (ref 0.8–1.7)
ALP SERPL-CCNC: 77 U/L (ref 45–117)
ALT SERPL-CCNC: 43 U/L (ref 16–61)
ANION GAP SERPL CALC-SCNC: 3 MMOL/L (ref 3–18)
AST SERPL-CCNC: 30 U/L (ref 10–38)
BILIRUB SERPL-MCNC: 0.6 MG/DL (ref 0.2–1)
BUN SERPL-MCNC: 21 MG/DL (ref 7–18)
BUN/CREAT SERPL: 24 (ref 12–20)
CALCIUM SERPL-MCNC: 8.6 MG/DL (ref 8.5–10.1)
CHLORIDE SERPL-SCNC: 111 MMOL/L (ref 100–111)
CHOLEST SERPL-MCNC: 105 MG/DL
CO2 SERPL-SCNC: 29 MMOL/L (ref 21–32)
CREAT SERPL-MCNC: 0.89 MG/DL (ref 0.6–1.3)
ERYTHROCYTE [DISTWIDTH] IN BLOOD BY AUTOMATED COUNT: 12.2 % (ref 11.6–14.5)
GLOBULIN SER CALC-MCNC: 2.9 G/DL (ref 2–4)
GLUCOSE SERPL-MCNC: 108 MG/DL (ref 74–99)
HCT VFR BLD AUTO: 45 % (ref 36–48)
HDLC SERPL-MCNC: 52 MG/DL (ref 40–60)
HDLC SERPL: 2 {RATIO} (ref 0–5)
HGB BLD-MCNC: 15.2 G/DL (ref 13–16)
LDLC SERPL CALC-MCNC: 41.6 MG/DL (ref 0–100)
LIPID PROFILE,FLP: NORMAL
MCH RBC QN AUTO: 32.3 PG (ref 24–34)
MCHC RBC AUTO-ENTMCNC: 33.8 G/DL (ref 31–37)
MCV RBC AUTO: 95.5 FL (ref 74–97)
PLATELET # BLD AUTO: 225 K/UL (ref 135–420)
PMV BLD AUTO: 9.3 FL (ref 9.2–11.8)
POTASSIUM SERPL-SCNC: 4.8 MMOL/L (ref 3.5–5.5)
PROT SERPL-MCNC: 6.9 G/DL (ref 6.4–8.2)
PSA SERPL-MCNC: 1.3 NG/ML (ref 0–4)
RBC # BLD AUTO: 4.71 M/UL (ref 4.35–5.65)
SODIUM SERPL-SCNC: 143 MMOL/L (ref 136–145)
TRIGL SERPL-MCNC: 57 MG/DL (ref ?–150)
VLDLC SERPL CALC-MCNC: 11.4 MG/DL
WBC # BLD AUTO: 5.9 K/UL (ref 4.6–13.2)

## 2021-04-23 PROCEDURE — 84153 ASSAY OF PSA TOTAL: CPT

## 2021-04-23 PROCEDURE — 36415 COLL VENOUS BLD VENIPUNCTURE: CPT

## 2021-04-23 PROCEDURE — 80053 COMPREHEN METABOLIC PANEL: CPT

## 2021-04-23 PROCEDURE — 80061 LIPID PANEL: CPT

## 2021-04-23 PROCEDURE — 85027 COMPLETE CBC AUTOMATED: CPT

## 2021-04-25 NOTE — PROGRESS NOTES
72 y.o. WHITE male who presents for reevaluation    Denies any cardiovascular complaints. He remains active with chores, lawn work, does some calisthenics and 150 situps daily    Denies polyuria, polydipsia, nocturia, vision change. Not checking sugars at this time. No success with attempts at wt loss    No  or gi issues    For the last several months, he's been having a numbness/tingly sensation in the RUE from the shoulder to all his fingers. No actual neck pain, he is known to have l spine disease as noted below but not c spine disease.   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 Arnaldo Mays liver 11/00    Fib-4 was 0.54 from 10/14; US 2/18 showed fatty liver and hepatomegaly    GERD (gastroesophageal reflux disease)     and HH on UGI 11/00    Hip bursitis 2015    Dr Cynthia Gallegos Hyperlipidemia     calculated 10 yr risk score 15.2% (2/19)    Hypertension     IFG (impaired fasting glucose) 2011    Osteoarthritis, knee 2015    Dr Cynthia Gallegos Renal cysts, acquired, bilateral 02/2018    Us showed 1.4cm cyst right, 7 cm and 2.5 cm cysts on left    Rhinophyma     S/P cardiac cath 2000    nl, EF 60% Dr. Rachel Massing Umbilical hernia      Past Surgical History:   Procedure Laterality Date    HX COLONOSCOPY      Dr. Dianna Brink 10/10 divertics    HX HERNIA REPAIR  02/2019    Dr Ginny Lew umblical hernia repair    HX LUMBAR LAMINECTOMY  12/2019    Dr Sparkle Casas L4-5 hemilaminectomy and medial facectomy    HX ORTHOPAEDIC  2012    Dr. 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    Food insecurity     Worry: Not on file     Inability: Not on file   [employer] needs     Medical: Not on file     Non-medical: Not on file   Tobacco Use    Smoking status: Former Smoker     Quit date: 2005     Years since quittin.3    Smokeless tobacco: Never Used    Tobacco comment: 60+ py   Substance and Sexual Activity    Alcohol use: No    Drug use: No    Sexual activity: Not on file   Lifestyle    Physical activity     Days per week: Not on file     Minutes per session: Not on file    Stress: Not on file   Relationships    Social connections     Talks on phone: Not on file     Gets together: Not on file     Attends Synagogue service: Not on file     Active member of club or organization: Not on file     Attends meetings of clubs or organizations: Not on file     Relationship status: Not on file    Intimate partner violence     Fear of current or ex partner: Not on file     Emotionally abused: Not on file     Physically abused: Not on file     Forced sexual activity: Not on file   Other Topics Concern    Not on file   Social History Narrative    Not on file     Current Outpatient Medications   Medication Sig    glucosamine/chondr bryant A sod (glucosamine-chondroitin) 750-600 mg tab Take 1,500 mg by mouth two (2) times a day.  potassium 99 mg tablet Take 99 mg by mouth daily.  calcium-magnesium-zinc tab Take  by mouth daily.     methylPREDNISolone (MEDROL DOSEPACK) 4 mg tablet Per dose pack instructions    celecoxib (CELEBREX) 200 mg capsule TAKE ONE CAPSULE BY MOUTH DAILY    omeprazole (PRILOSEC) 40 mg capsule TAKE ONE CAPSULE BY MOUTH DAILY    amLODIPine (NORVASC) 5 mg tablet Take 1 Tab by mouth daily.  atorvastatin (LIPITOR) 20 mg tablet TAKE ONE TABLET BY MOUTH DAILY    acetaminophen (TYLENOL EXTRA STRENGTH) 500 mg tablet Take 1,000 mg by mouth every six (6) hours as needed for Pain.  cholecalciferol (VITAMIN D3) 1,000 unit tablet Take 2,500 Units by mouth daily.  ascorbic acid (VITAMIN C) 500 mg tablet Take  by mouth daily.  multivitamin (ONE A DAY) tablet Take 1 Tab by mouth daily.  cyanocobalamin 1,000 mcg tablet Take 1,000 mcg by mouth daily. No current facility-administered medications for this visit. Allergies   Allergen Reactions    Cymbalta [Duloxetine] Other (comments)     Felt funny     REVIEW OF SYSTEMS: colo 10/10 Dr Trina Beth no vision change or eye pain  Oral  no mouth pain, tongue or tooth problems  Ears  no hearing loss, ear pain, fullness, no swallowing problems  Cardiac  no CP, PND, orthopnea, edema, palpitations or syncope  Chest  no breast masses  Resp  no wheezing, chronic coughing, dyspnea  GI  no nausea, vomiting, change in bowel habits, bleeding, hemorrhoids  Urinary  no dysuria, hematuria, flank pain, urgency, frequency    Visit Vitals  /72   Pulse 71   Temp 97.2 °F (36.2 °C) (Temporal)   Resp 14   Ht 5' 10\" (1.778 m)   Wt 189 lb (85.7 kg)   SpO2 94%   BMI 27.12 kg/m²   A&O x3  Affect is appropriate. Mood stable  No apparent distress  Anicteric, no JVD, adenopathy or thyromegaly. No carotid bruits or radiated murmur  Lungs clear to auscultation, no wheezes or rales  Heart showed regular rate and rhythm. No murmur, rubs, gallops  Abdomen soft nontender, no hepatosplenomegaly or masses.    Rectal and prostate deferred as he is going for colo in near future  Extremities without edema. Pulses 1-2+ symmetrically.   strength 5/5 RUE, positive spurling    LABS  From 12/10 showed gluc 118, cr 1.10,             alt 51, hba1c 6.1,         wbc 7.9, hb 16.0, plt 261, psa 1.2, ua neg  From 7/11 showed                                           2hr GTT 58  From 5/12 showed   gluc 114, cr 1.10, gfr>60,                         wbc 7.0, hb 15.2, plt 240  From 10/13 showed gluc 111, cr 0.92, gfr 91,  alt 30,          chol 169, tg 47,   hdl 51, ldl-c 109, wbc 5.8, hb 15.2, plt 226, psa 1.2  From 10/14 showed gluc 102, cr 1.04, gfr>60, alt 31,          chol 177, tg 97,   hdl 45, ldl-c 113, wbc 7.0, hb 15.5, plt 289, k 5.9  From 10/14 showed        hba1c 6.3  From 12/15 showed gluc 110, cr 1.17, gfr>60, alt 52, hba1c 6.2, chol 179, tg 79,   hdl 53, ldl-c 110, wbc 5.8, hb 16.6, plt 244, ua neg  From 1/17 showed   gluc 116, cr 0.98, gfr>60, alt 67,          chol 165, tg 177, hdl 48, ldl-c 82,   wbc 6.6, hb 16.0, plt 229, ua neg  From 1/18 showed   gluc 109, cr 0.95, gfr>60, alt 64, hba1c 6.1, chol 128, tg 163, hdl 44, ldl-c 63,   wbc 6.8, hb 15.7, plt 238, vit d 33.9, tsh 1.39, ast 22, ap 75, tb 0.6  From 2/19 showed   gluc 116, cr 0.94, gfr>60, alt 58, hba1c 6.1, chol 147, tg 59,   hdl 47, ldl-c 88,   wbc 5.5, hb 14.9, plt 248, vit d 35.2, tsh 1.30  From 12/19 showed gluc 103, cr 1.07, gfr>60,           wbc 6.8, hb 15.9, plt 243  From 4/21 showed   gluc 108, cr 0.89, gfr>60, alt 43. hba1c 5.6, chol 105, tg 57,   hdl 52, ldl-c 42,  wbc 5.9, hb 15.2, plt 225, psa 1.30    We reviewed the patient's labs from the last several visits to point out trends in the numbers        Patient Active Problem List   Diagnosis Code    Prediabetes 2011 R73.03    Arthritis Dr Marlen Hinds M19.90    GERD without esophagitis K21.9    Diverticulosis of colon without hemorrhage K57.30    Hyperlipidemia E78.5    Primary hypertension I10    Renal cysts, acquired, bilateral N28.1    Degenerative arthritis of lumbar spine M47.816    Coronary artery disease involving native coronary artery of native heart without angina pectoris I25.10    Spinal stenosis of lumbar region M48.061     Assessment and plan:  1. GERD. Continue current  2. Cervical radiculopathy. Empiric medrol given and will refer back to Dr Noah Timmons for further eval with the chronicity of the process  3. Diverticulosis. Fiber, colo 2020  4. IFG. Continue lifestyle and dietary measures. Wt loss again reiterated  5. HTN. Continue current regimen. 6. HLP. Continue current regimen. RTC 4/22    Above conditions discussed at length and patient vocalized understanding.   All questions answered to patient satisfaction

## 2021-04-29 ENCOUNTER — OFFICE VISIT (OUTPATIENT)
Dept: INTERNAL MEDICINE CLINIC | Age: 66
End: 2021-04-29
Payer: MEDICARE

## 2021-04-29 VITALS
HEIGHT: 70 IN | WEIGHT: 189 LBS | DIASTOLIC BLOOD PRESSURE: 72 MMHG | RESPIRATION RATE: 14 BRPM | SYSTOLIC BLOOD PRESSURE: 118 MMHG | OXYGEN SATURATION: 94 % | HEART RATE: 71 BPM | BODY MASS INDEX: 27.06 KG/M2 | TEMPERATURE: 97.2 F

## 2021-04-29 DIAGNOSIS — R73.03 PREDIABETES: ICD-10-CM

## 2021-04-29 DIAGNOSIS — M54.12 CERVICAL RADICULITIS: ICD-10-CM

## 2021-04-29 DIAGNOSIS — Z12.5 SCREENING FOR PROSTATE CANCER: ICD-10-CM

## 2021-04-29 DIAGNOSIS — Z71.89 ADVANCED DIRECTIVES, COUNSELING/DISCUSSION: ICD-10-CM

## 2021-04-29 DIAGNOSIS — I25.10 CORONARY ARTERY DISEASE INVOLVING NATIVE CORONARY ARTERY OF NATIVE HEART WITHOUT ANGINA PECTORIS: ICD-10-CM

## 2021-04-29 DIAGNOSIS — Z00.00 WELCOME TO MEDICARE PREVENTIVE VISIT: Primary | ICD-10-CM

## 2021-04-29 DIAGNOSIS — Z12.11 SCREEN FOR COLON CANCER: ICD-10-CM

## 2021-04-29 DIAGNOSIS — K57.30 DIVERTICULOSIS OF COLON WITHOUT HEMORRHAGE: ICD-10-CM

## 2021-04-29 DIAGNOSIS — K21.9 GERD WITHOUT ESOPHAGITIS: ICD-10-CM

## 2021-04-29 DIAGNOSIS — Z13.1 SCREENING FOR DIABETES MELLITUS: ICD-10-CM

## 2021-04-29 DIAGNOSIS — I10 PRIMARY HYPERTENSION: ICD-10-CM

## 2021-04-29 DIAGNOSIS — E78.5 HYPERLIPIDEMIA, UNSPECIFIED HYPERLIPIDEMIA TYPE: ICD-10-CM

## 2021-04-29 DIAGNOSIS — M47.816 OSTEOARTHRITIS OF LUMBAR SPINE, UNSPECIFIED SPINAL OSTEOARTHRITIS COMPLICATION STATUS: ICD-10-CM

## 2021-04-29 LAB — HBA1C MFR BLD HPLC: 5.6 %

## 2021-04-29 PROCEDURE — 83036 HEMOGLOBIN GLYCOSYLATED A1C: CPT | Performed by: INTERNAL MEDICINE

## 2021-04-29 PROCEDURE — G8536 NO DOC ELDER MAL SCRN: HCPCS | Performed by: INTERNAL MEDICINE

## 2021-04-29 PROCEDURE — 1101F PT FALLS ASSESS-DOCD LE1/YR: CPT | Performed by: INTERNAL MEDICINE

## 2021-04-29 PROCEDURE — G0463 HOSPITAL OUTPT CLINIC VISIT: HCPCS | Performed by: INTERNAL MEDICINE

## 2021-04-29 PROCEDURE — G0402 INITIAL PREVENTIVE EXAM: HCPCS | Performed by: INTERNAL MEDICINE

## 2021-04-29 PROCEDURE — 99497 ADVNCD CARE PLAN 30 MIN: CPT | Performed by: INTERNAL MEDICINE

## 2021-04-29 PROCEDURE — G8419 CALC BMI OUT NRM PARAM NOF/U: HCPCS | Performed by: INTERNAL MEDICINE

## 2021-04-29 PROCEDURE — 3017F COLORECTAL CA SCREEN DOC REV: CPT | Performed by: INTERNAL MEDICINE

## 2021-04-29 PROCEDURE — G8427 DOCREV CUR MEDS BY ELIG CLIN: HCPCS | Performed by: INTERNAL MEDICINE

## 2021-04-29 PROCEDURE — G8754 DIAS BP LESS 90: HCPCS | Performed by: INTERNAL MEDICINE

## 2021-04-29 PROCEDURE — 99214 OFFICE O/P EST MOD 30 MIN: CPT | Performed by: INTERNAL MEDICINE

## 2021-04-29 PROCEDURE — G8752 SYS BP LESS 140: HCPCS | Performed by: INTERNAL MEDICINE

## 2021-04-29 PROCEDURE — G8510 SCR DEP NEG, NO PLAN REQD: HCPCS | Performed by: INTERNAL MEDICINE

## 2021-04-29 RX ORDER — METHYLPREDNISOLONE 4 MG/1
TABLET ORAL
Qty: 1 DOSE PACK | Refills: 0 | Status: SHIPPED | OUTPATIENT
Start: 2021-04-29 | End: 2021-07-08

## 2021-04-29 RX ORDER — CALCIUM/MAGNESIUM/ZINC
TABLET ORAL DAILY
COMMUNITY

## 2021-04-29 RX ORDER — GLUCOSAMINE/CHONDR SU A SOD 750-600 MG
1500 TABLET ORAL 2 TIMES DAILY
COMMUNITY

## 2021-04-29 NOTE — PROGRESS NOTES
Christi Diaz presents today for   Chief Complaint   Patient presents with    Annual Wellness Visit    Hypertension     f/u with labs              Depression Screening:  3 most recent PHQ Screens 1/14/2020   PHQ Not Done -   Little interest or pleasure in doing things Not at all   Feeling down, depressed, irritable, or hopeless Not at all   Total Score PHQ 2 0       Learning Assessment:  Learning Assessment 12/2/2019   PRIMARY LEARNER Patient   HIGHEST LEVEL OF EDUCATION - PRIMARY LEARNER  GRADUATED HIGH SCHOOL OR GED   BARRIERS PRIMARY LEARNER [address] CAREGIVER No   CO-LEARNER NAME -   PRIMARY LANGUAGE ENGLISH    NEED -   LEARNER PREFERENCE PRIMARY READING   LEARNING SPECIAL TOPICS -   ANSWERED BY Frederick Jonas   RELATIONSHIP SELF       Abuse Screening:  Abuse Screening Questionnaire 10/18/2016   Do you ever feel afraid of your partner? N   Are you in a relationship with someone who physically or mentally threatens you? N   Is it safe for you to go home? Y       Fall Risk  No flowsheet data found. Coordination of Care:  1. Have you been to the ER, urgent care clinic since your last visit? Hospitalized since your last visit? no    2. Have you seen or consulted any other health care providers outside of the [address] [place] [address] since your last visit? Include any pap smears or colon screening.  no

## 2021-04-29 NOTE — PROGRESS NOTES
This is a \"Welcome to [employer]"  Initial Preventive Physical Examination    I have reviewed the patient's medical history in detail and updated the computerized patient record. Assessment/Plan   Education and counseling provided:  Are appropriate based on today's review and evaluation  End-of-Life planning (with patient's consent)  Pneumococcal Vaccine  Influenza Vaccine  Prostate cancer screening tests (PSA, covered annually)  Colorectal cancer screening tests  Cardiovascular screening blood test  Diabetes screening test    1. Welcome to Medicare preventive visit  2. Prediabetes 2011  -     AMB POC HEMOGLOBIN A1C  -     HEMOGLOBIN A1C WITH EAG; Future  3. Coronary artery disease involving native coronary artery of native heart without angina pectoris  4. Primary hypertension  5. GERD without esophagitis  6. Osteoarthritis of lumbar spine, unspecified spinal osteoarthritis complication status  7. Diverticulosis of colon without hemorrhage  8. Hyperlipidemia, unspecified hyperlipidemia type  -     CBC W/O DIFF; Future  -     METABOLIC PANEL, COMPREHENSIVE; Future  -     LIPID PANEL; Future  9. Advanced directives, counseling/discussion  10. Screen for colon cancer  -     REFERRAL FOR COLONOSCOPY  11. Screening for diabetes mellitus  12. Cervical radiculitis  -     methylPREDNISolone (MEDROL DOSEPACK) 4 mg tablet; Per dose pack instructions, Normal, Disp-1 Dose Pack, R-0  -     REFERRAL TO PHYSICIAL MEDICINE REHAB  13. Screening for prostate cancer  -     PSA SCREENING (SCREENING);  Future     pvx 23 in future as he recently had covid shot  Depression Risk Screen     3 most recent PHQ Screens 4/29/2021   PHQ Not Done -   Little interest or pleasure in doing things Not at all   Feeling down, depressed, irritable, or hopeless Not at all   Total Score PHQ 2 0       Alcohol Risk Screen    Do you average more than 1 drink per night or more than 7 drinks a week: No    In the past three months have you have had more than 4 drinks containing alcohol on one occasion: No          Functional Ability and Level of Safety    Diet: No special diet      Hearing: Hearing is good. Vision Screening:  Vision is good. No exam data present      Activities of Daily Living: The home contains: no safety equipment. Patient does total self care      Ambulation: with no difficulty      Exercise level: moderately active     Fall Risk Screen:  Fall Risk Assessment, last 12 mths 4/29/2021   Able to walk? Yes   Fall in past 12 months? 0   Do you feel unsteady? 0   Are you worried about falling 0      Abuse Screen:  Patient is not abused       Screening EKG   EKG order placed: No    End of Life Planning   Advanced care planning directives were discussed with the patient and /or family/caregiver.      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 Fabiola Hernandez liver 11/00    Fib-4 was 0.54 from 10/14; US 2/18 showed fatty liver and hepatomegaly    GERD (gastroesophageal reflux disease)     and HH on UGI 11/00    Hip bursitis 2015    Dr Mccurdy Clamp Hyperlipidemia     calculated 10 yr risk score 15.2% (2/19)    Hypertension     IFG (impaired fasting glucose) 2011    Osteoarthritis, knee 2015    Dr Mccurdy Clamp Renal cysts, acquired, bilateral 02/2018    Us showed 1.4cm cyst right, 7 cm and 2.5 cm cysts on left    Rhinophyma     S/P cardiac cath 2000    nl, EF 60% Dr. Rachel Massing Umbilical hernia       Past Surgical History:   Procedure Laterality Date    HX COLONOSCOPY      Dr. Dianna Brink 10/10 divertics    HX HERNIA REPAIR  02/2019    Dr Ginny Lew umblical hernia repair    HX LUMBAR LAMINECTOMY  12/2019    Dr Sparkle Casas L4-5 hemilaminectomy and medial facectomy    HX ORTHOPAEDIC  2012    Dr. Pinky Li left distal clavicle excision and acromioplasty    OR CARDIAC SURG PROCEDURE UNLIST  02/2019    echo showed milc conc lvh, ef 65%, no wma abn, nl valves    OR CARDIAC SURG PROCEDURE UNLIST  02/2019    CTA chest showed atherosclerosis RCA and LAD     Current Outpatient Medications   Medication Sig Dispense Refill    glucosamine/chondr bryant A sod (glucosamine-chondroitin) 750-600 mg tab Take 1,500 mg by mouth two (2) times a day.  potassium 99 mg tablet Take 99 mg by mouth daily.  calcium-magnesium-zinc tab Take  by mouth daily.  methylPREDNISolone (MEDROL DOSEPACK) 4 mg tablet Per dose pack instructions 1 Dose Pack 0    celecoxib (CELEBREX) 200 mg capsule TAKE ONE CAPSULE BY MOUTH DAILY 90 Cap 2    omeprazole (PRILOSEC) 40 mg capsule TAKE ONE CAPSULE BY MOUTH DAILY 16 Cap 1    amLODIPine (NORVASC) 5 mg tablet Take 1 Tab by mouth daily. 90 Tab 3    atorvastatin (LIPITOR) 20 mg tablet TAKE ONE TABLET BY MOUTH DAILY 90 Tab 4    acetaminophen (TYLENOL EXTRA STRENGTH) 500 mg tablet Take 1,000 mg by mouth every six (6) hours as needed for Pain.  cholecalciferol (VITAMIN D3) 1,000 unit tablet Take 2,500 Units by mouth daily.  ascorbic acid (VITAMIN C) 500 mg tablet Take  by mouth daily.  multivitamin (ONE A DAY) tablet Take 1 Tab by mouth daily.  cyanocobalamin 1,000 mcg tablet Take 1,000 mcg by mouth daily.        Allergies   Allergen Reactions    Cymbalta [Duloxetine] Other (comments)     Felt funny Family History   Problem Relation Age of Onset    Other Father         cardiomyopathy    Heart Disease Brother      Social History     Tobacco Use    Smoking status: Former Smoker     Quit date: 2005     Years since quittin.3    Smokeless tobacco: Never Used    Tobacco comment: 60+ py   Substance Use Topics    Alcohol use: No       Manasa Hussein MD

## 2021-04-29 NOTE — PATIENT INSTRUCTIONS
Medicare Wellness Visit, Male    The best way to live healthy is to have a lifestyle where you eat a well-balanced diet, exercise regularly, limit alcohol use, and quit all forms of tobacco/nicotine, if applicable. Regular preventive services are another way to keep healthy. Preventive services (vaccines, screening tests, monitoring & exams) can help personalize your care plan, which helps you manage your own care. Screening tests can find health problems at the earliest stages, when they are easiest to treat. [facility] follows the current, evidence-based guidelines published by the [facility] Sharan Sung ([facility]) when recommending preventive services for our patients. Because we follow these guidelines, sometimes recommendations change over time as research supports it. (For example, a prostate screening blood test is no longer routinely recommended for men with no symptoms). Of course, you and your doctor may decide to screen more often for some diseases, based on your risk and co-morbidities (chronic disease you are already diagnosed with). Preventive services for you include:  - Medicare offers their members a free annual wellness visit, which is time for you and your primary care provider to discuss and plan for your preventive service needs. Take advantage of this benefit every year!  -All adults over age 72 should receive the recommended pneumonia vaccines. Current USPSTF guidelines recommend a series of two vaccines for the best pneumonia protection.   -All adults should have a flu vaccine yearly and tetanus vaccine every 10 years.  -All adults age 48 and older should receive the shingles vaccines (series of two vaccines).        -All adults age 38-68 who are overweight should have a diabetes screening test once every three years.   -Other screening tests & preventive services for persons with diabetes include: an eye exam to screen for diabetic retinopathy, a kidney function test, a foot exam, and stricter control over your cholesterol.   -Cardiovascular screening for adults with routine risk involves an electrocardiogram (ECG) at intervals determined by the provider.   -Colorectal cancer screening should be done for adults age 54-65 with no increased risk factors for colorectal cancer. There are a number of acceptable methods of screening for this type of cancer. Each test has its own benefits and drawbacks. Discuss with your provider what is most appropriate for you during your annual wellness visit. The different tests include: colonoscopy (considered the best screening method), a fecal occult blood test, a fecal DNA test, and sigmoidoscopy.  -All adults born between [facility] should be screened once for Hepatitis C.  -An Abdominal Aortic Aneurysm (AAA) Screening is recommended for men age 73-68 who has ever smoked in their lifetime.      Here is a list of your current Health Maintenance items (your personalized list of preventive services) with a due date:  Health Maintenance Due   Topic Date Due    Shingles Vaccine (1 of 2) Never done    Pneumococcal Vaccine (1 of 1 - PPSV23) Never done    Colorectal Screening  10/25/2020

## 2021-04-29 NOTE — ACP (ADVANCE CARE PLANNING)
Advance Care Planning     General Advance Care Planning (ACP) Conversation      Date of Conversation: 4/29/2021  Conducted with: Patient with Decision Making Capacity    Healthcare Decision Maker:     Click here to complete [address] including selection of the [address] Relationship (ie \"Primary\")  Today we documented Decision Maker(s) consistent with Legal Next of Kin hierarchy.     Content/Action Overview:   Has NO ACP documents/care preferences - information provided, considering goals and options  Reviewed DNR/DNI and patient elects Full Code (Attempt Resuscitation)  Topics discussed: ventilation preferences, hospitalization preferences and resuscitation preferences  Additional Comments: none     Length of Voluntary ACP Conversation in minutes:  16 minutes    Susannah Copeland MD

## 2021-06-29 DIAGNOSIS — K21.9 GERD WITHOUT ESOPHAGITIS: ICD-10-CM

## 2021-06-29 RX ORDER — OMEPRAZOLE 40 MG/1
40 CAPSULE, DELAYED RELEASE ORAL DAILY
Qty: 90 CAPSULE | Refills: 3 | Status: SHIPPED | OUTPATIENT
Start: 2021-06-29 | End: 2022-08-11

## 2021-06-29 NOTE — TELEPHONE ENCOUNTER
Last Visit: 4/29/21 with MD Marcus Clark  Next Appointment: 5/2/22 with MD Marcus Clark  Previous Refill Encounter(s): 6/1/20 #16 with 1 refill & 5/28/20 #90 with 3 refills    Requested Prescriptions     Pending Prescriptions Disp Refills    omeprazole (PRILOSEC) 40 mg capsule 90 Capsule 3     Sig: Take 1 Capsule by mouth daily.

## 2021-07-08 NOTE — PERIOP NOTES
PRE-SURGICAL INSTRUCTIONS        Patient's Name:  Ricky Lackey      Today's Date:  7/8/2021              Surgery Date:  7/13/2021                1. Do NOT eat or drink anything, including candy, gum, or ice chips after midnight on 7/13, unless you have specific instructions from your surgeon or anesthesia provider to do so.  2. You may brush your teeth before coming to the hospital.  3. No smoking 24 hours prior to the day of surgery. 4. No alcohol 24 hours prior to the day of surgery. 5. No recreational drugs for one week prior to the day of surgery. 6. Leave all valuables, including money/purse, at home. 7. Remove all jewelry, nail polish, acrylic nails, and makeup (including mascara); no lotions powders, deodorant, or perfume/cologne/after shave on the skin. 8. Glasses/contact lenses and dentures may be worn to the hospital.  They will be removed prior to surgery. 9. Call your doctor if symptoms of a cold or illness develop within 24-48 hours prior to your surgery. 10.  AN ADULT MUST DRIVE YOU HOME AFTER OUTPATIENT SURGERY. 11.  If you are having an outpatient procedure, please make arrangements for a responsible adult to be with you for 24 hours after your surgery. 12.  NO VISITORS in the hospital at this time for outpatient procedures. Exceptions may be made for surgical admissions, per nursing unit guidelines      Special Instructions:      Bring list of CURRENT medications. Bring any pertinent legal medical records. Take these medications the morning of surgery with a sip of water:  Per office    Complete bowel prep per MD instructions. On the day of surgery, come in the main entrance of [facility]. Let the  at the desk know you are there for surgery. A staff member will come escort you to the surgical area on the second floor.     If you have any questions or concerns, please do not hesitate to call:     (Prior to the day of surgery) PAT department: [phone]   (Day of surgery) Pre-Op department:  [phone]    These surgical instructions were reviewed with the patient during the PAT phone call.

## 2021-07-12 ENCOUNTER — ANESTHESIA EVENT (OUTPATIENT)
Dept: ENDOSCOPY | Age: 66
End: 2021-07-12
Payer: MEDICARE

## 2021-07-13 ENCOUNTER — ANESTHESIA (OUTPATIENT)
Dept: ENDOSCOPY | Age: 66
End: 2021-07-13
Payer: MEDICARE

## 2021-07-13 ENCOUNTER — HOSPITAL ENCOUNTER (OUTPATIENT)
Age: 66
Setting detail: OUTPATIENT SURGERY
Discharge: HOME | End: 2021-07-13
Attending: INTERNAL MEDICINE | Admitting: INTERNAL MEDICINE
Payer: MEDICARE

## 2021-07-13 VITALS
HEIGHT: 70 IN | TEMPERATURE: 98.6 F | WEIGHT: 189 LBS | SYSTOLIC BLOOD PRESSURE: 124 MMHG | OXYGEN SATURATION: 98 % | BODY MASS INDEX: 27.06 KG/M2 | DIASTOLIC BLOOD PRESSURE: 76 MMHG | HEART RATE: 56 BPM | RESPIRATION RATE: 11 BRPM

## 2021-07-13 PROCEDURE — 00811 ANES LWR INTST NDSC NOS: CPT | Performed by: ANESTHESIOLOGY

## 2021-07-13 PROCEDURE — 2709999900 HC NON-CHARGEABLE SUPPLY: Performed by: INTERNAL MEDICINE

## 2021-07-13 PROCEDURE — 74011250636 HC RX REV CODE- 250/636: Performed by: ANESTHESIOLOGY

## 2021-07-13 PROCEDURE — 88305 TISSUE EXAM BY PATHOLOGIST: CPT

## 2021-07-13 PROCEDURE — 76060000032 HC ANESTHESIA 0.5 TO 1 HR: Performed by: INTERNAL MEDICINE

## 2021-07-13 PROCEDURE — 74011250636 HC RX REV CODE- 250/636: Performed by: NURSE ANESTHETIST, CERTIFIED REGISTERED

## 2021-07-13 PROCEDURE — 77030038604 HC SNR ENDO EXACTO USEN -B: Performed by: INTERNAL MEDICINE

## 2021-07-13 PROCEDURE — 76040000019: Performed by: INTERNAL MEDICINE

## 2021-07-13 PROCEDURE — 74011250637 HC RX REV CODE- 250/637: Performed by: NURSE ANESTHETIST, CERTIFIED REGISTERED

## 2021-07-13 RX ORDER — SODIUM CHLORIDE 0.9 % (FLUSH) 0.9 %
5-40 SYRINGE (ML) INJECTION AS NEEDED
Status: CANCELLED | OUTPATIENT
Start: 2021-07-13

## 2021-07-13 RX ORDER — DEXTROMETHORPHAN/PSEUDOEPHED 2.5-7.5/.8
1.2 DROPS ORAL
Status: CANCELLED | OUTPATIENT
Start: 2021-07-13

## 2021-07-13 RX ORDER — FLUMAZENIL 0.1 MG/ML
0.2 INJECTION INTRAVENOUS
Status: CANCELLED | OUTPATIENT
Start: 2021-07-13 | End: 2021-07-13

## 2021-07-13 RX ORDER — PROPOFOL 10 MG/ML
INJECTION, EMULSION INTRAVENOUS AS NEEDED
Status: DISCONTINUED | OUTPATIENT
Start: 2021-07-13 | End: 2021-07-13 | Stop reason: HOSPADM

## 2021-07-13 RX ORDER — ATROPINE SULFATE 0.1 MG/ML
0.5 INJECTION INTRAVENOUS
Status: CANCELLED | OUTPATIENT
Start: 2021-07-13 | End: 2021-07-14

## 2021-07-13 RX ORDER — NALOXONE HYDROCHLORIDE 0.4 MG/ML
0.4 INJECTION, SOLUTION INTRAMUSCULAR; INTRAVENOUS; SUBCUTANEOUS
Status: CANCELLED | OUTPATIENT
Start: 2021-07-13 | End: 2021-07-13

## 2021-07-13 RX ORDER — FAMOTIDINE 20 MG/1
20 TABLET, FILM COATED ORAL ONCE
Status: COMPLETED | OUTPATIENT
Start: 2021-07-13 | End: 2021-07-13

## 2021-07-13 RX ORDER — SODIUM CHLORIDE, SODIUM LACTATE, POTASSIUM CHLORIDE, CALCIUM CHLORIDE 600; 310; 30; 20 MG/100ML; MG/100ML; MG/100ML; MG/100ML
25 INJECTION, SOLUTION INTRAVENOUS CONTINUOUS
Status: DISCONTINUED | OUTPATIENT
Start: 2021-07-13 | End: 2021-07-13 | Stop reason: HOSPADM

## 2021-07-13 RX ORDER — EPINEPHRINE 0.1 MG/ML
1 INJECTION INTRACARDIAC; INTRAVENOUS
Status: CANCELLED | OUTPATIENT
Start: 2021-07-13 | End: 2021-07-14

## 2021-07-13 RX ORDER — LIDOCAINE HYDROCHLORIDE 10 MG/ML
0.1 INJECTION, SOLUTION EPIDURAL; INFILTRATION; INTRACAUDAL; PERINEURAL AS NEEDED
Status: DISCONTINUED | OUTPATIENT
Start: 2021-07-13 | End: 2021-07-13 | Stop reason: HOSPADM

## 2021-07-13 RX ORDER — SODIUM CHLORIDE 0.9 % (FLUSH) 0.9 %
5-40 SYRINGE (ML) INJECTION EVERY 8 HOURS
Status: CANCELLED | OUTPATIENT
Start: 2021-07-13

## 2021-07-13 RX ADMIN — PROPOFOL 30 MCG: 10 INJECTION, EMULSION INTRAVENOUS at 12:34

## 2021-07-13 RX ADMIN — SODIUM CHLORIDE, SODIUM LACTATE, POTASSIUM CHLORIDE, AND CALCIUM CHLORIDE 25 ML/HR: 600; 310; 30; 20 INJECTION, SOLUTION INTRAVENOUS at 11:28

## 2021-07-13 RX ADMIN — PROPOFOL 30 MCG: 10 INJECTION, EMULSION INTRAVENOUS at 12:39

## 2021-07-13 RX ADMIN — PROPOFOL 30 MCG: 10 INJECTION, EMULSION INTRAVENOUS at 12:43

## 2021-07-13 RX ADMIN — FAMOTIDINE 20 MG: 20 TABLET ORAL at 10:58

## 2021-07-13 RX ADMIN — PROPOFOL 30 MCG: 10 INJECTION, EMULSION INTRAVENOUS at 12:27

## 2021-07-13 RX ADMIN — PROPOFOL 100 MCG: 10 INJECTION, EMULSION INTRAVENOUS at 12:23

## 2021-07-13 RX ADMIN — PROPOFOL 30 MCG: 10 INJECTION, EMULSION INTRAVENOUS at 12:30

## 2021-07-13 NOTE — H&P
[email or web address]  [phone]    GASTROENTEROLOGY Pre-Procedure H and P      Impression/Plan:   1. This patient is consented for a colonoscopy for colon cancer screening. Chief Complaint: colon cancer screening. HPI:  Mandy Maria is a 72 y.o. male who presents for a colonoscopy for colon cancer screening.     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 Madison Burgos liver 11/00    Fib-4 was 0.54 from 10/14; US 2/18 showed fatty liver and hepatomegaly    GERD (gastroesophageal reflux disease)     and HH on UGI 11/00    Hip bursitis 2015    Dr Kelly River Hyperlipidemia     calculated 10 yr risk score 15.2% (2/19)    Hypertension     IFG (impaired fasting glucose) 2011    Osteoarthritis, knee 2015    Dr Kelly River Renal cysts, acquired, bilateral 02/2018    Us showed 1.4cm cyst right, 7 cm and 2.5 cm cysts on left    Rhinophyma     S/P cardiac cath 2000    nl, EF 60% Dr. Jasper Garcia Umbilical hernia        PSH:   Past Surgical History:   Procedure Laterality Date    HX COLONOSCOPY      Dr. Colby Hays 10/10 divertics    HX HERNIA REPAIR  02/2019    Dr Danitza Toro umblical hernia repair    HX LUMBAR LAMINECTOMY  12/2019    Dr Constance Germain L4-5 hemilaminectomy and medial facectomy    HX ORTHOPAEDIC  2012    Dr. Bessie Holstein left distal clavicle excision and acromioplasty    [place] CARDIAC SURG PROCEDURE UNLIST  02/2019    echo showed milc conc lvh, ef 65%, no wma abn, nl valves    [place] CARDIAC SURG PROCEDURE UNLIST  02/2019    CTA chest showed atherosclerosis RCA and LAD       Social HX:   Social History     Socioeconomic History    Marital status:      Spouse name: Not on file    Number of children: 3    Years of education: Not on file    Highest education level: Not on file   Occupational History    Occupation: mgr at [employer]   Tobacco Use    Smoking status: Former Smoker     Quit date: 2004     Years since quittin.8    Smokeless tobacco: Never Used    Tobacco comment: 60+ py   Substance and Sexual Activity    Alcohol use: No    Drug use: No    Sexual activity: Not on file   Other Topics Concern    Not on file   Social History Narrative    Not on file     Social Determinants of Health     Financial Resource Strain:     Difficulty of Paying Living Expenses:    Food Insecurity:     Worried About [address] in the Last Year:     [address] [employer] in the Last Year:    Transportation Needs:     Lack of Transportation (Medical):  Lack of Transportation (Non-Medical):    Physical Activity:     Days of Exercise per Week:     Minutes of Exercise per Session:    Stress:     Feeling of Stress :    Social Connections:     Frequency of Communication with Friends and Family:     Frequency of Social Gatherings with Friends and Family:     Attends Adventism Services:     Active Member of Clubs or Organizations:     Attends Club or Organization Meetings:     Marital Status:    Intimate Partner Violence:     Fear of Current or Ex-Partner:     Emotionally Abused:     Physically Abused:     Sexually Abused:        FHX:   Family History   Problem Relation Age of Onset    Other Father         cardiomyopathy    Heart Disease Brother        Allergy:   Allergies   Allergen Reactions    Cymbalta [Duloxetine] Other (comments)     [place] funny       Home Medications:     Medications Prior to Admission   Medication Sig    omeprazole (PRILOSEC) 40 mg capsule Take 1 Capsule by mouth daily.     amLODIPine (NORVASC) 5 mg tablet TAKE ONE TABLET BY MOUTH DAILY    celecoxib (CELEBREX) 200 mg capsule TAKE ONE CAPSULE BY MOUTH DAILY    atorvastatin (LIPITOR) 20 mg tablet TAKE ONE TABLET BY MOUTH DAILY    glucosamine/chondr bryant A sod (glucosamine-chondroitin) 750-600 mg tab Take 1,500 mg by mouth two (2) times a day.  potassium 99 mg tablet Take 99 mg by mouth daily.  calcium-magnesium-zinc tab Take  by mouth daily.  acetaminophen (TYLENOL EXTRA STRENGTH) 500 mg tablet Take 1,000 mg by mouth every six (6) hours as needed for Pain.  cholecalciferol (VITAMIN D3) 1,000 unit tablet Take 2,500 Units by mouth daily.  ascorbic acid (VITAMIN C) 500 mg tablet Take  by mouth daily.  cyanocobalamin 1,000 mcg tablet Take 1,000 mcg by mouth daily.  multivitamin (ONE A DAY) tablet Take 1 Tab by mouth daily. Review of Systems:     A complete 10 point review of systems was performed and pertinents are as per the HPI. Remainder of the review of systems was negative. Visit Vitals  /81   Pulse 64   Temp 97.9 °F (36.6 °C)   Resp 18   Ht 5' 10\" (1.778 m)   Wt 85.7 kg (189 lb)   SpO2 98%   BMI 27.12 kg/m²       Physical Assessment:     General: alert, cooperative, no acute distress, appears stated age. HEENT: normocephalic, no scleral icterus, moist mucous membranes, EOMs intact, no neck masses noted. Respiratory: lungs clear to auscultation bilaterally. Cardiovascular: regular rate and rhythm, no murmurs, rubs or gallops. Abdomen: normal bowel sounds, soft, non-tender to palpation. Extremities: no lower extremity edema, no cyanosis or clubbing. Neuro: alert and oriented x 3; non-focal exam.  Skin: no rashes. Psych: normal mood and affect. River Mcnulty MD  [facility]  [email or web address]  Phone: [phone]  Pager: [phone]  [email or web address]

## 2021-07-13 NOTE — ANESTHESIA POSTPROCEDURE EVALUATION
Procedure(s):  COLONOSCOPY with Polypectomy. MAC    Anesthesia Post Evaluation      Multimodal analgesia: multimodal analgesia used between 6 hours prior to anesthesia start to PACU discharge  Patient location during evaluation: PACU  Patient participation: complete - patient participated  Level of consciousness: awake  Pain score: 0  Airway patency: patent  Anesthetic complications: no  Cardiovascular status: acceptable  Respiratory status: acceptable  Hydration status: acceptable  Post anesthesia nausea and vomiting:  none  Final Post Anesthesia Temperature Assessment:  Normothermia (36.0-37.5 degrees C)      INITIAL Post-op Vital signs:   Vitals Value Taken Time   /66 07/13/21 1300   Temp 37 °C (98.6 °F) 07/13/21 1252   Pulse 57 07/13/21 1307   Resp 10 07/13/21 1307   SpO2 97 % 07/13/21 1307   Vitals shown include unvalidated device data.

## 2021-07-13 NOTE — DISCHARGE INSTRUCTIONS
Patient Education   Patient Education        Hemorrhoids: Care Instructions  Your Care Instructions     Hemorrhoids are enlarged veins that develop in the anal canal. Bleeding during bowel movements, itching, swelling, and rectal pain are the most common symptoms. They can be uncomfortable at times, but hemorrhoids rarely are a serious problem. You can treat most hemorrhoids with simple changes to your diet and bowel habits. These changes include eating more fiber and not straining to pass stools. Most hemorrhoids do not need surgery or other treatment unless they are very large and painful or bleed a lot. Follow-up care is a key part of your treatment and safety. Be sure to make and go to all appointments, and call your doctor if you are having problems. It's also a good idea to know your test results and keep a list of the medicines you take. How can you care for yourself at home? · Sit in a few inches of warm water (sitz bath) 3 times a day and after bowel movements. The warm water helps with pain and itching. · Put ice on your anal area several times a day for 10 minutes at a time. Put a thin cloth between the ice and your skin. Follow this by placing a warm, wet towel on the area for another 10 to 20 minutes. · Take pain medicines exactly as directed. ? If the doctor gave you a prescription medicine for pain, take it as prescribed. ? If you are not taking a prescription pain medicine, ask your doctor if you can take an over-the-counter medicine. · Keep the anal area clean, but be gentle. Use water and a fragrance-free soap, such as [place], or use baby wipes or medicated pads, such as Tucks. · Wear cotton underwear and loose clothing to decrease moisture in the anal area. · Eat more fiber. Include foods such as whole-grain breads and cereals, raw vegetables, raw and dried fruits, and beans. · Drink plenty of fluids.  If you have kidney, heart, or liver disease and have to limit fluids, talk with your doctor before you increase the amount of fluids you drink. · Use a stool softener that contains bran or psyllium. You can save money by buying bran or psyllium (available in bulk at most health food stores) and sprinkling it on foods or stirring it into fruit juice. Or you can use a product such as Metamucil or Hydrocil. · Practice healthy bowel habits. ? Go to the bathroom as soon as you have the urge. ? Avoid straining to pass stools. Relax and give yourself time to let things happen naturally. ? Do not hold your breath while passing stools. ? Do not read while sitting on the toilet. Get off the toilet as soon as you have finished. · Take your medicines exactly as prescribed. Call your doctor if you think you are having a problem with your medicine. When should you call for help? Call 911 anytime you think you may need emergency care. For example, call if:    · You pass maroon or very bloody stools. Call your doctor now or seek immediate medical care if:    · You have increased pain.     · You have increased bleeding. Watch closely for changes in your health, and be sure to contact your doctor if:    · Your symptoms have not improved after 3 or 4 days. Where can you learn more? Go to http://www.WiMi5.com/  Enter F228 in the search box to learn more about \"Hemorrhoids: Care Instructions. \"  Current as of: April 15, 2020               Content Version: 12.8  © 1336-5320 [employer]. Care instructions adapted under license by [employer] (which disclaims liability or warranty for this information). If you have questions about a medical condition or this instruction, always ask your healthcare professional. [place] [address] any warranty or liability for your use of this information. Colon Polyps: Care Instructions  Your Care Instructions     Colon polyps are growths in the colon or the rectum.  The cause of most colon polyps is not known, and most people who get them do not have any problems. But a certain kind can turn into cancer. For this reason, regular testing for colon polyps is important for people as they get older. It is also important for anyone who has an increased risk for colon cancer. Polyps are usually found through routine colon cancer screening tests. Although most colon polyps are not cancerous, they are usually removed and then tested for cancer. Screening for colon cancer saves lives because the cancer can usually be cured if it is caught early. If you have a polyp that is the type that can turn into cancer, you may need more tests to examine your entire colon. The doctor will remove any other polyps that he or she finds, and you will be tested more often. Follow-up care is a key part of your treatment and safety. Be sure to make and go to all appointments, and call your doctor if you are having problems. It's also a good idea to know your test results and keep a list of the medicines you take. How can you care for yourself at home? Regular exams to look for colon polyps are the best way to prevent polyps from turning into colon cancer. These can include stool tests, sigmoidoscopy, colonoscopy, and CT colonography. Talk with your doctor about a testing schedule that is right for you. To prevent polyps  There is no home treatment that can prevent colon polyps. But these steps may help lower your risk for cancer. · Stay active. Being active can help you get to and stay at a healthy weight. Try to exercise on most days of the week. Walking is a good choice. · Eat well. Choose a variety of vegetables, fruits, legumes (such as peas and beans), fish, poultry, and whole grains. · Do not smoke. If you need help quitting, talk to your doctor about stop-smoking programs and medicines. These can increase your chances of quitting for good. · If you drink alcohol, limit how much you drink.  Limit alcohol to 2 drinks a day for men and 1 drink a day for women. When should you call for help? Call your doctor now or seek immediate medical care if:    · You have severe belly pain.     · Your stools are maroon or very bloody. Watch closely for changes in your health, and be sure to contact your doctor if:    · You have a fever.     · You have nausea or vomiting.     · You have a change in bowel habits (new constipation or diarrhea).     · Your symptoms get worse or are not improving as expected. Where can you learn more? Go to http://www.rabago.com/  Enter C571 in the search box to learn more about \"Colon Polyps: Care Instructions. \"  Current as of: December 17, 2020               Content Version: 12.8  © 2006-2021 [email or web address]. Care instructions adapted under license by [employer] (which disclaims liability or warranty for this information). If you have questions about a medical condition or this instruction, always ask your healthcare professional. [place] [address] any warranty or liability for your use of this information. Colonoscopy: What to Expect at [address] [place] [address]  After you have a colonoscopy, you will stay at the clinic for 1 to 2 hours until the medicines wear off. Then you can go home. But you will need to arrange for a ride. Your doctor will tell you when you can eat and do your other usual activities. Your doctor will talk to you about when you will need your next colonoscopy. Your doctor can help you decide how often you need to be checked. This will depend on the results of your test and your risk for colorectal cancer. After the test, you may be bloated or have gas pains. You may need to pass gas. If a biopsy was done or a polyp was removed, you may have streaks of blood in your stool (feces) for a few days. This care sheet gives you a general idea about how long it will take for you to recover.  But each person recovers at a different pace. Follow the steps below to get better as quickly as possible. How can you care for yourself at home? Activity  · Rest when you feel tired. · You can do your normal activities when it feels okay to do so. Diet  · Follow your doctor's directions for eating. · Unless your doctor has told you not to, drink plenty of fluids. This helps to replace the fluids that were lost during the colon prep. · Do not drink alcohol. Medicines  · If polyps were removed or a biopsy was done during the test, your doctor may tell you not to take aspirin or other anti-inflammatory medicines for a few days. These include ibuprofen (Advil, Motrin) and naproxen (Aleve). Other instructions  · For your safety, do not drive or operate machinery until the medicine wears off and you can think clearly. Your doctor may tell you not to drive or operate machinery until the day after your test.  · Do not sign legal documents or make major decisions until the medicine wears off and you can think clearly. The anesthesia can make it hard for you to fully understand what you are agreeing to. Follow-up care is a key part of your treatment and safety. Be sure to make and go to all appointments, and call your doctor if you are having problems. It's also a good idea to know your test results and keep a list of the medicines you take. When should you call for help? Call 911 anytime you think you may need emergency care. For example, call if:  · You passed out (lost consciousness). · You pass maroon or bloody stools. · You have severe belly pain. Call your doctor now or seek immediate medical care if:  · Your stools are black and tarlike. · Your stools have streaks of blood, but you did not have a biopsy or any polyps removed. · You have belly pain, or your belly is swollen and firm. · You vomit. · You have a fever. · You are very dizzy.   Watch closely for changes in your health, and be sure to contact your doctor if you have any problems. Where can you learn more? Go to [employer]  Enter E264 in the search box to learn more about \"Colonoscopy: What to Expect at Home. \"   © 0997-3913 [employer]. Care instructions adapted under license by [employer] (which disclaims liability or warranty for this information). This care instruction is for use with your licensed healthcare professional. If you have questions about a medical condition or this instruction, always ask your healthcare professional. [place] [address] any warranty or liability for your use of this information. Content Version: 59.1.803483; Current as of: November 14, 2014      DISCHARGE SUMMARY from Nurse     POST-PROCEDURE INSTRUCTIONS:    Call your Physician if you:  ? Observe any excess bleeding. ? Develop a temperature over 100.5o F.  ? Experience abdominal, shoulder or chest pain. ? Notice any signs of decreased circulation or nerve impairment to an extremity such as a change in color, persistent numbness, tingling, coldness or increase in pain. ? Vomit blood or you have nausea and vomiting lasting longer than 4 hours. ? Are unable to take medications. ? Are unable to urinate within 8 hours after discharge following general anesthesia or intravenous sedation. For the next 24 hours after receiving general anesthesia or intravenous sedation, or while taking prescription Narcotics, limit your activities:  ? Do NOT drive a motor vehicle, operate hazard machinery or power tools, or perform tasks that require coordination. The medication you received during your procedure may have some effect on your mental awareness. ? Do NOT make important personal or business decisions. The medication you received during your procedure may have some effect on your mental awareness. ? Do NOT drink alcoholic beverages. These drinks do not mix well with the medications that have been given to you. ?  Upon discharge from the hospital, you must be accompanied by a responsible adult. ? Resume your diet as directed by your physician. ? Resume medications as your physician has prescribed. ? Please give a list of your current medications to your Primary Care Provider. ? Please update this list whenever your medications are discontinued, doses are changed, or new medications (including over-the-counter products) are added. ? Please carry medication information at all times in case of emergency situations. These are general instructions for a healthy lifestyle:    No smoking/ No tobacco products/ Avoid exposure to second hand smoke.  Surgeon General's Warning:  Quitting smoking now greatly reduces serious risk to your health. Obesity, smoking, and a sedentary lifestyle greatly increase your risk for illness.  A healthy diet, regular physical exercise & weight monitoring are important for maintaining a healthy lifestyle   You may be retaining fluid if you have a history of heart failure or if you experience any of the following symptoms:  Weight gain of 3 pounds or more overnight or 5 pounds in a week, increased swelling in our hands or feet or shortness of breath while lying flat in bed. Please call your doctor as soon as you notice any of these symptoms; do not wait until your next office visit. Recognize signs and symptoms of STROKE:  F  -  Face looks uneven  A  -  Arms unable to move or move unevenly  S  -  Speech slurred or non-existent  T  -  Time to call 911 - as soon as signs and symptoms begin - DO NOT go back to bed or wait to see If you get better - TIME IS BRAIN. Colorectal Screening   Colorectal cancer almost always develops from precancerous polyps (abnormal growths) in the colon or rectum. Screening tests can find precancerous polyps, so that they can be removed before they turn into cancer.  Screening tests can also find colorectal cancer early, when treatment works best.  [facility] Speak with your physician about when you should begin screening and how often you should be tested. Additional Information    If you have questions, please call [phone]. Remember, MyChart is NOT to be used for urgent needs. For medical emergencies, dial 911. Educational references and/or instructions provided during this visit included:    See attached. APPOINTMENTS:    {[facility] APPTS:08398}    Discharge information has been reviewed with the {PATIENT PARENT GUARDIAN:06692}. The {PATIENT PARENT GUARDIAN:63059} verbalized understanding.

## 2021-07-13 NOTE — ANESTHESIA PREPROCEDURE EVALUATION
Relevant Problems   No relevant active problems       Anesthetic History   No history of anesthetic complications            Review of Systems / Medical History  Patient summary reviewed and pertinent labs reviewed    Pulmonary                Comments: Ex smoker   Neuro/Psych   Within defined limits           Cardiovascular    Hypertension          CAD and hyperlipidemia    Exercise tolerance: >4 METS  Comments: EF 60% 2019   GI/Hepatic/Renal     GERD      Liver disease     Endo/Other        Arthritis     Other Findings              Physical Exam    Airway  Mallampati: II  TM Distance: > 6 cm  Neck ROM: normal range of motion   Mouth opening: Normal     Cardiovascular  Regular rate and rhythm,  S1 and S2 normal,  no murmur, click, rub, or gallop             Dental  No notable dental hx       Pulmonary  Breath sounds clear to auscultation               Abdominal  GI exam deferred       Other Findings            Anesthetic Plan    ASA: 4  Anesthesia type: MAC          Induction: Intravenous  Anesthetic plan and risks discussed with: Patient

## 2021-07-13 NOTE — PROCEDURES
[email or web address]  [place], [address]      Brief Procedure Note    Terrence Khaill  1955  [id number]    Date of Procedure: 7/13/2021    Preoperative diagnosis: Colon cancer screening [Z12.11]    Postoperative diagnosis: Ascending Polyp.  Diverticulosis; sigmoid polyp; hemorrhoids    Type of Anesthesia: MAC (Monitored anesthesia care)    Description of findings: same as post op dx    Procedure: Procedure(s):  COLONOSCOPY with Polypectomy    :  Dr. Erik Whittington MD    Assistant(s): Endoscopy Technician-1: Meenu Willard    EBL:None    Specimens:   ID Type Source Tests Collected by Time Destination   1 : Ascending polyp Preservative Colon, Ascending  Kaitlin Boyle MD 7/13/2021 1236 Pathology   2 : Sigmoid polyp Preservative Sigmoid  Kaitlin Boyle MD 7/13/2021 1243 Pathology       Findings: See printed and scanned procedure note    Complications: None    Dr. Erik Whittington MD  7/13/2021  12:53 PM

## 2021-08-10 PROBLEM — D12.6 COLON ADENOMA: Status: ACTIVE | Noted: 2021-08-10

## 2021-08-13 ENCOUNTER — TELEPHONE (OUTPATIENT)
Dept: INTERNAL MEDICINE CLINIC | Age: 66
End: 2021-08-13

## 2021-08-13 NOTE — TELEPHONE ENCOUNTER
Pt's spouse called---Celecoxib 200mg is not working. Either he needs increased dosage or something else.   Please advise pt at [phone]

## 2021-11-19 RX ORDER — CELECOXIB 200 MG/1
CAPSULE ORAL
Qty: 90 CAPSULE | Refills: 1 | Status: SHIPPED | OUTPATIENT
Start: 2021-11-19 | End: 2022-05-12

## 2022-03-18 PROBLEM — D12.6 COLON ADENOMA: Status: ACTIVE | Noted: 2021-08-10

## 2022-03-18 PROBLEM — M47.816 DEGENERATIVE ARTHRITIS OF LUMBAR SPINE: Status: ACTIVE | Noted: 2019-02-12

## 2022-03-18 PROBLEM — M48.061 SPINAL STENOSIS OF LUMBAR REGION: Status: ACTIVE | Noted: 2019-12-09

## 2022-03-19 PROBLEM — I25.10 CORONARY ARTERY DISEASE INVOLVING NATIVE CORONARY ARTERY OF NATIVE HEART WITHOUT ANGINA PECTORIS: Status: ACTIVE | Noted: 2019-12-02

## 2022-03-19 PROBLEM — N28.1 RENAL CYSTS, ACQUIRED, BILATERAL: Status: ACTIVE | Noted: 2019-02-12

## 2022-03-19 PROBLEM — E78.5 HYPERLIPIDEMIA: Status: ACTIVE | Noted: 2018-01-20

## 2022-03-19 PROBLEM — I10 PRIMARY HYPERTENSION: Status: ACTIVE | Noted: 2018-01-20

## 2022-04-18 ENCOUNTER — TELEPHONE (OUTPATIENT)
Dept: INTERNAL MEDICINE CLINIC | Age: 67
End: 2022-04-18

## 2022-04-18 DIAGNOSIS — Z12.5 SCREENING PSA (PROSTATE SPECIFIC ANTIGEN): ICD-10-CM

## 2022-04-18 DIAGNOSIS — Z00.00 ENCOUNTER FOR ANNUAL PHYSICAL EXAM: Primary | ICD-10-CM

## 2022-04-18 DIAGNOSIS — R73.03 PREDIABETES: ICD-10-CM

## 2022-04-18 DIAGNOSIS — E78.5 HYPERLIPIDEMIA, UNSPECIFIED HYPERLIPIDEMIA TYPE: ICD-10-CM

## 2022-04-18 NOTE — TELEPHONE ENCOUNTER
----- Message from Spenser Chilel sent at 4/18/2022 11:36 AM EDT -----  Subject: Message to Provider    QUESTIONS  Information for Provider? Patient would like blood work before physical   and states that orders are no longer good for labs. please put in new   orders. Please call when active orders for blood work are in.  ---------------------------------------------------------------------------  --------------  [address]  What is the best way for the office to contact you? OK to leave message on   voicemail  Preferred Call Back Phone Number? [phone]  ---------------------------------------------------------------------------  --------------  SCRIPT ANSWERS  Relationship to Patient?  Self

## 2022-04-26 ENCOUNTER — HOSPITAL ENCOUNTER (OUTPATIENT)
Dept: LAB | Age: 67
Discharge: HOME | End: 2022-04-26
Payer: MEDICARE

## 2022-04-26 ENCOUNTER — APPOINTMENT (OUTPATIENT)
Dept: INTERNAL MEDICINE CLINIC | Age: 67
End: 2022-04-26

## 2022-04-26 DIAGNOSIS — E78.5 HYPERLIPIDEMIA, UNSPECIFIED HYPERLIPIDEMIA TYPE: ICD-10-CM

## 2022-04-26 DIAGNOSIS — Z12.5 SCREENING FOR PROSTATE CANCER: ICD-10-CM

## 2022-04-26 DIAGNOSIS — R73.03 PREDIABETES: ICD-10-CM

## 2022-04-26 LAB
ALBUMIN SERPL-MCNC: 4 G/DL (ref 3.4–5)
ALBUMIN/GLOB SERPL: 1.4 {RATIO} (ref 0.8–1.7)
ALP SERPL-CCNC: 71 U/L (ref 45–117)
ALT SERPL-CCNC: 36 U/L (ref 16–61)
ANION GAP SERPL CALC-SCNC: 7 MMOL/L (ref 3–18)
AST SERPL-CCNC: 21 U/L (ref 10–38)
BILIRUB SERPL-MCNC: 0.8 MG/DL (ref 0.2–1)
BUN SERPL-MCNC: 20 MG/DL (ref 7–18)
BUN/CREAT SERPL: 20 (ref 12–20)
CALCIUM SERPL-MCNC: 10.1 MG/DL (ref 8.5–10.1)
CHLORIDE SERPL-SCNC: 110 MMOL/L (ref 100–111)
CHOLEST SERPL-MCNC: 103 MG/DL
CO2 SERPL-SCNC: 26 MMOL/L (ref 21–32)
CREAT SERPL-MCNC: 0.99 MG/DL (ref 0.6–1.3)
ERYTHROCYTE [DISTWIDTH] IN BLOOD BY AUTOMATED COUNT: 12.8 % (ref 11.6–14.5)
EST. AVERAGE GLUCOSE BLD GHB EST-MCNC: 117 MG/DL
GLOBULIN SER CALC-MCNC: 2.8 G/DL (ref 2–4)
GLUCOSE SERPL-MCNC: 101 MG/DL (ref 74–99)
HBA1C MFR BLD: 5.7 % (ref 4.2–5.6)
HCT VFR BLD AUTO: 44.7 % (ref 36–48)
HDLC SERPL-MCNC: 51 MG/DL (ref 40–60)
HDLC SERPL: 2 {RATIO} (ref 0–5)
HGB BLD-MCNC: 14.7 G/DL (ref 13–16)
LDLC SERPL CALC-MCNC: 40.4 MG/DL (ref 0–100)
LIPID PROFILE,FLP: NORMAL
MCH RBC QN AUTO: 32.4 PG (ref 24–34)
MCHC RBC AUTO-ENTMCNC: 32.9 G/DL (ref 31–37)
MCV RBC AUTO: 98.5 FL (ref 78–100)
NRBC # BLD: 0 K/UL (ref 0–0.01)
NRBC BLD-RTO: 0 PER 100 WBC
PLATELET # BLD AUTO: 241 K/UL (ref 135–420)
PMV BLD AUTO: 9.5 FL (ref 9.2–11.8)
POTASSIUM SERPL-SCNC: 4.5 MMOL/L (ref 3.5–5.5)
PROT SERPL-MCNC: 6.8 G/DL (ref 6.4–8.2)
PSA SERPL-MCNC: 1.5 NG/ML (ref 0–4)
RBC # BLD AUTO: 4.54 M/UL (ref 4.35–5.65)
SODIUM SERPL-SCNC: 143 MMOL/L (ref 136–145)
TRIGL SERPL-MCNC: 58 MG/DL (ref ?–150)
VLDLC SERPL CALC-MCNC: 11.6 MG/DL
WBC # BLD AUTO: 7 K/UL (ref 4.6–13.2)

## 2022-04-26 PROCEDURE — 80061 LIPID PANEL: CPT

## 2022-04-26 PROCEDURE — 84153 ASSAY OF PSA TOTAL: CPT

## 2022-04-26 PROCEDURE — 85027 COMPLETE CBC AUTOMATED: CPT

## 2022-04-26 PROCEDURE — 36415 COLL VENOUS BLD VENIPUNCTURE: CPT

## 2022-04-26 PROCEDURE — 80053 COMPREHEN METABOLIC PANEL: CPT

## 2022-04-26 PROCEDURE — 83036 HEMOGLOBIN GLYCOSYLATED A1C: CPT

## 2022-04-27 NOTE — PROGRESS NOTES
77 y.o. WHITE/NON- male who presents for reevaluation    Denies any cardiovascular complaints. He remains active with chores, mows his lawn twice a week along with his neighbors, walks 1.5 mi.d, does some calisthenics and 150 situps daily    Denies polyuria, polydipsia, nocturia, vision change. Not checking sugars at this time.      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 Marylen Ditch liver 11/00    Fib-4 was 0.54 from 10/14; US 2/18 showed fatty liver and hepatomegaly    GERD (gastroesophageal reflux disease)     and HH on UGI 11/00    H/O echocardiogram     mild cLVH, ef 65%, no wma abn, nl valves (2/19)    Hip bursitis 2015    Dr Suzi Galdamez Hyperlipidemia     calculated 10 yr risk score 15.2% (2/19)    Hypertension     IFG (impaired fasting glucose) 2011    Osteoarthritis, knee 2015    Dr Suzi Galdamez Renal cysts, acquired, bilateral 02/2018    Us showed 1.4cm cyst right, 7 cm and 2.5 cm cysts on left    Rhinophyma     S/P cardiac cath 2000    nl, EF 60% Dr. Mannie Francisco Umbilical hernia      Past Surgical History:   Procedure Laterality Date    COLONOSCOPY N/A 7/13/2021    Dr. Yrn Garay 10/10 divertics; Dr Daiana Cabrera diverttics and tubular adenoma and hyperplastic    HX HERNIA REPAIR  02/2019    Dr Tiffany Downs umblical hernia repair    HX LUMBAR LAMINECTOMY  12/2019    Dr Danita Foster L4-5 hemilaminectomy and medial facectomy    HX ORTHOPAEDIC  2012    Dr. Sarthak Roberson left distal clavicle excision and acromioplasty     Social History     Socioeconomic History    Marital status:      Spouse name: Not on file    Number of children: 3    Years of education: Not on file    Highest education level: Not on file   Occupational History    Occupation: mgr at [employer]   Tobacco Use    Smoking status: Former Smoker     Quit date: 2004     Years since quittin.6    Smokeless tobacco: Never Used    Tobacco comment: 60+ py   Substance and Sexual Activity    Alcohol use: No    Drug use: No    Sexual activity: Not on file   Other Topics Concern    Not on file   Social History Narrative    Not on file     Social Determinants of Health     Financial Resource Strain:     Difficulty of Paying Living Expenses: Not on file   Food Insecurity:     Worried About [address] in the Last Year: Not on file    [address] [place] in the Last Year: Not on file   Transportation Needs:     Lack of Transportation (Medical): Not on file    Lack of Transportation (Non-Medical):  Not on file   Physical Activity:     Days of Exercise per Week: Not on file    Minutes of Exercise per Session: Not on file   Stress:     Feeling of Stress : Not on file   Social Connections:     Frequency of Communication with Friends and Family: Not on file    Frequency of Social Gatherings with Friends and Family: Not on file    Attends Cheondoism Services: Not on file    Active Member of [address] [place] [address] or Organizations: Not on file    Attends Club or Organization Meetings: Not on file    Marital Status: Not on file   Intimate Partner Violence:     Fear of Current or Ex-Partner: Not on file    Emotionally Abused: Not on file    Physically Abused: Not on file    Sexually Abused: Not on file   Housing Stability:     Unable to Pay for Housing in the Last Year: Not on file    Number of [place] in the Last Year: Not on file    Unstable Housing in the Last Year: Not on file     Current Outpatient Medications   Medication Sig    celecoxib (CELEBREX) 200 mg capsule TAKE ONE CAPSULE BY MOUTH DAILY    omeprazole (PRILOSEC) 40 mg capsule Take 1 Capsule by mouth daily.  amLODIPine (NORVASC) 5 mg tablet TAKE ONE TABLET BY MOUTH DAILY    atorvastatin (LIPITOR) 20 mg tablet TAKE ONE TABLET BY MOUTH DAILY    glucosamine/chondr bryant A sod (glucosamine-chondroitin) 750-600 mg tab Take 1,500 mg by mouth two (2) times a day.  potassium 99 mg tablet Take 99 mg by mouth daily.  calcium-magnesium-zinc tab Take  by mouth daily.  acetaminophen (TYLENOL EXTRA STRENGTH) 500 mg tablet Take 1,000 mg by mouth every six (6) hours as needed for Pain.  cholecalciferol (VITAMIN D3) 1,000 unit tablet Take 2,500 Units by mouth daily.  ascorbic acid (VITAMIN C) 500 mg tablet Take  by mouth daily.  multivitamin (ONE A DAY) tablet Take 1 Tab by mouth daily.  cyanocobalamin 1,000 mcg tablet Take 1,000 mcg by mouth daily. No current facility-administered medications for this visit. Allergies   Allergen Reactions    Cymbalta [Duloxetine] Other (comments)     Felt funny     REVIEW OF SYSTEMS: colo 10/10 Dr Malika Jones no vision change or eye pain  Oral  no mouth pain, tongue or tooth problems  Ears  no hearing loss, ear pain, fullness, no swallowing problems  Cardiac  no CP, PND, orthopnea, edema, palpitations or syncope  Chest  no breast masses  Resp  no wheezing, chronic coughing, dyspnea  GI  no nausea, vomiting, change in bowel habits, bleeding, hemorrhoids  Urinary  no dysuria, hematuria, flank pain, urgency, frequency    Visit Vitals  /76   Temp 97 °F (36.1 °C) (Temporal)   Resp 16   Ht 5' 10\" (1.778 m)   Wt 191 lb (86.6 kg)   BMI 27.41 kg/m²   A&O x3  Affect is appropriate. Mood stable  No apparent distress  Anicteric, no JVD, adenopathy or thyromegaly. No carotid bruits or radiated murmur  Lungs clear to auscultation, no wheezes or rales  Heart showed regular rate and rhythm.  No murmur, rubs, gallops  Abdomen soft nontender, no hepatosplenomegaly or masses.    Rectal and prostate deferred as he jsut had colo done<1 year ago    LABS  From 12/10 showed gluc 118, cr 1.10,             alt 51, hba1c 6.1,         wbc 7.9, hb 16.0, plt 261, psa 1.2, ua neg  From 7/11 showed                                           2hr GTT 58  From 5/12 showed   gluc 114, cr 1.10, gfr>60,                         wbc 7.0, hb 15.2, plt 240  From 10/13 showed gluc 111, cr 0.92, gfr 91,  alt 30,          chol 169, tg 47,   hdl 51, ldl-c 109, wbc 5.8, hb 15.2, plt 226, psa 1.2  From 10/14 showed gluc 102, cr 1.04, gfr>60, alt 31,          chol 177, tg 97,   hdl 45, ldl-c 113, wbc 7.0, hb 15.5, plt 289, k 5.9  From 10/14 showed        hba1c 6.3  From 12/15 showed gluc 110, cr 1.17, gfr>60, alt 52, hba1c 6.2, chol 179, tg 79,   hdl 53, ldl-c 110, wbc 5.8, hb 16.6, plt 244, ua neg  From 1/17 showed   gluc 116, cr 0.98, gfr>60, alt 67,          chol 165, tg 177, hdl 48, ldl-c 82,   wbc 6.6, hb 16.0, plt 229, ua neg  From 1/18 showed   gluc 109, cr 0.95, gfr>60, alt 64, hba1c 6.1, chol 128, tg 163, hdl 44, ldl-c 63,   wbc 6.8, hb 15.7, plt 238, vit d 33.9, tsh 1.39, ast 22, ap 75, tb 0.6  From 2/19 showed   gluc 116, cr 0.94, gfr>60, alt 58, hba1c 6.1, chol 147, tg 59,   hdl 47, ldl-c 88,   wbc 5.5, hb 14.9, plt 248, vit d 35.2, tsh 1.30  From 12/19 showed gluc 103, cr 1.07, gfr>60,           wbc 6.8, hb 15.9, plt 243  From 4/21 showed   gluc 108, cr 0.89, gfr>60, alt 43. hba1c 5.6, chol 105, tg 57,   hdl 52, ldl-c 42,  wbc 5.9, hb 15.2, plt 225, psa 1.30    Results for orders placed or performed during the hospital encounter of 04/26/22   CBC W/O DIFF   Result Value Ref Range    WBC 7.0 4.6 - 13.2 K/uL    RBC 4.54 4.35 - 5.65 M/uL    HGB 14.7 13.0 - 16.0 g/dL    HCT 44.7 36.0 - 48.0 %    MCV 98.5 78.0 - 100.0 FL    MCH 32.4 24.0 - 34.0 PG    MCHC 32.9 31.0 - 37.0 g/dL    RDW 12.8 11.6 - 14.5 %    PLATELET 349 882 - 389 K/uL MPV 9.5 9.2 - 11.8 FL    NRBC 0.0 0  WBC    ABSOLUTE NRBC 0.00 0.00 - 3.22 K/uL   METABOLIC PANEL, COMPREHENSIVE   Result Value Ref Range    Sodium 143 136 - 145 mmol/L    Potassium 4.5 3.5 - 5.5 mmol/L    Chloride 110 100 - 111 mmol/L    CO2 26 21 - 32 mmol/L    Anion gap 7 3.0 - 18 mmol/L    Glucose 101 (H) 74 - 99 mg/dL    BUN 20 (H) 7.0 - 18 MG/DL    Creatinine 0.99 0.6 - 1.3 MG/DL    BUN/Creatinine ratio 20 12 - 20      GFR est AA >60 >60 ml/min/1.73m2    GFR est non-AA >60 >60 ml/min/1.73m2    Calcium 10.1 8.5 - 10.1 MG/DL    Bilirubin, total 0.8 0.2 - 1.0 MG/DL    ALT (SGPT) 36 16 - 61 U/L    AST (SGOT) 21 10 - 38 U/L    Alk.  phosphatase 71 45 - 117 U/L    Protein, total 6.8 6.4 - 8.2 g/dL    Albumin 4.0 3.4 - 5.0 g/dL    Globulin 2.8 2.0 - 4.0 g/dL    A-G Ratio 1.4 0.8 - 1.7     LIPID PANEL   Result Value Ref Range    LIPID PROFILE          Cholesterol, total 103 <200 MG/DL    Triglyceride 58 <150 MG/DL    HDL Cholesterol 51 40 - 60 MG/DL    LDL, calculated 40.4 0 - 100 MG/DL    VLDL, calculated 11.6 MG/DL    CHOL/HDL Ratio 2.0 0 - 5.0     PSA SCREENING (SCREENING)   Result Value Ref Range    Prostate Specific Ag 1.5 0.0 - 4.0 ng/mL   HEMOGLOBIN A1C WITH EAG   Result Value Ref Range    Hemoglobin A1c 5.7 (H) 4.2 - 5.6 %    Est. average glucose 117 mg/dL     We reviewed the patient's labs from the last several visits to point out trends in the numbers        Patient Active Problem List   Diagnosis Code    IFG (impaired fasting glucose) R73.01    Arthritis Dr Colleen Moss M19.90    GERD without esophagitis K21.9    Diverticulosis of colon without hemorrhage K57.30    Hyperlipidemia E78.5    Primary hypertension I10    Renal cysts, acquired, bilateral N28.1    Degenerative arthritis of lumbar spine M47.816    Coronary artery disease involving native coronary artery of native heart without angina pectoris I25.10    Spinal stenosis of lumbar region M48.061    Colon adenoma D12.6     Assessment and plan: 1. GERD. Continue current  2. HLP. At target on current regimen. 3.  Diverticulosis and adenoma. Fiber, colo 2020  4. IFG. Continue lifestyle and dietary measures. Wt loss again reiterated  5. HTN. Controlled on current regimen. 6.  prevnar 13        RTC 5/23    Above conditions discussed at length and patient vocalized understanding. All questions answered to patient satisfaction        ICD-10-CM ICD-9-CM    1. Primary hypertension  I10 401.9    2. Coronary artery disease involving native coronary artery of native heart without angina pectoris  I25.10 414.01    3. GERD without esophagitis  K21.9 530.81    4. Colon adenoma  D12.6 211.3    5. IFG (impaired fasting glucose)  R73.01 790.21 CBC W/O DIFF      METABOLIC PANEL, COMPREHENSIVE      HEMOGLOBIN A1C WITH EAG   6. Hyperlipidemia, unspecified hyperlipidemia type  E78.5 272.4 LIPID PANEL   7. Diverticulosis of colon without hemorrhage  K57.30 562.10    8.  Screening for malignant neoplasm of prostate  Z12.5 V76.44 PSA SCREENING (SCREENING)

## 2022-04-27 NOTE — PROGRESS NOTES
This is a subsequent medicare wellness exam    I have reviewed the patient's medical history in detail and updated the computerized patient record. Assessment/Plan   Education and counseling provided:  Are appropriate based on today's review and evaluation  End-of-Life planning (with patient's consent)  Pneumococcal Vaccine  Influenza Vaccine  Prostate cancer screening tests (PSA, covered annually)  Colorectal cancer screening tests  Cardiovascular screening blood test  Diabetes screening test    1. Medicare annual wellness visit, subsequent  2. Primary hypertension  3. Coronary artery disease involving native coronary artery of native heart without angina pectoris  4. GERD without esophagitis  5. Colon adenoma  6. IFG (impaired fasting glucose)  -     CBC W/O DIFF; Future  -     METABOLIC PANEL, COMPREHENSIVE; Future  -     HEMOGLOBIN A1C WITH EAG; Future  7. Hyperlipidemia, unspecified hyperlipidemia type  -     LIPID PANEL; Future  8. Diverticulosis of colon without hemorrhage  9. Screening for malignant neoplasm of prostate  -     PSA SCREENING (SCREENING); Future  10. Advanced directives, counseling/discussion  -     ADVANCE CARE PLANNING FIRST 30 MINS       Depression Risk Factor Screening     3 most recent PHQ Screens 5/2/2022   PHQ Not Done -   Little interest or pleasure in doing things Not at all   Feeling down, depressed, irritable, or hopeless Not at all   Total Score PHQ 2 0       Alcohol & Drug Abuse Risk Screen    Do you average more than 1 drink per night or more than 7 drinks a week: No    In the past three months have you have had more than 4 drinks containing alcohol on one occasion: No          Functional Ability and Level of Safety    Hearing: Hearing is good. Activities of Daily Living: The home contains: no safety equipment. Patient does total self care      Ambulation: with no difficulty     Fall Risk:  Fall Risk Assessment, last 12 mths 5/2/2022   Able to walk?  Yes   Fall in past 12 months? 0   Do you feel unsteady?  0   Are you worried about falling 0      Abuse Screen:  Patient is not abused       Cognitive Screening    Has your family/caregiver stated any concerns about your memory: no     Cognitive Screening: Normal - Mini Cog Test    Health Maintenance Due     Health Maintenance Due   Topic Date Due    Shingrix Vaccine Age 49> (1 of 2) Never done    Pneumococcal 65+ years (1 - PCV) Never done    Depression Screen  04/29/2022       Patient Care Team   Patient Care Team:  Ced Gomez MD as PCP - General (Internal Medicine)  Ced Gomez MD [facility] Empaneled Provider    History     Patient Active Problem List   Diagnosis Code    IFG (impaired fasting glucose) R73.01    Arthritis Dr Blaine Randall M19.90    GERD without esophagitis K21.9    Diverticulosis of colon without hemorrhage K57.30    Hyperlipidemia E78.5    Primary hypertension I10    Renal cysts, acquired, bilateral N28.1    Degenerative arthritis of lumbar spine M47.816    Coronary artery disease involving native coronary artery of native heart without angina pectoris I25.10    Spinal stenosis of lumbar region M48.061    Colon adenoma D12.6     Past Medical History:   Diagnosis Date    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 Carlotta Carlos liver 11/00    Fib-4 was 0.54 from 10/14; US 2/18 showed fatty liver and hepatomegaly    GERD (gastroesophageal reflux disease)     and HH on UGI 11/00    H/O echocardiogram     mild cLVH, ef 65%, no wma abn, nl valves (2/19)    Hip bursitis 2015    Dr Sandrita Calderón Hyperlipidemia     calculated 10 yr risk score 15.2% (2/19)    Hypertension     IFG (impaired fasting glucose) 2011    Osteoarthritis, knee 2015    Dr Marlyn Montalvo Renal cysts, acquired, bilateral 02/2018    Us showed 1.4cm cyst right, 7 cm and 2.5 cm cysts on left    Rhinophyma     S/P cardiac cath 2000    nl, EF 60% Dr. Tyler Partida Umbilical hernia       Past Surgical History:   Procedure Laterality Date    COLONOSCOPY N/A 7/13/2021    Dr. Ludwig Fitzgerald 10/10 divertics; Dr Jasper Canavan diverttics and tubular adenoma and hyperplastic    HX HERNIA REPAIR  02/2019    Dr Zach Tierney umblical hernia repair    HX LUMBAR LAMINECTOMY  12/2019    Dr [address] L4-5 hemilaminectomy and medial facectomy    HX ORTHOPAEDIC  2012    Dr. Damon Richards left distal clavicle excision and acromioplasty     Current Outpatient Medications   Medication Sig Dispense Refill    celecoxib (CELEBREX) 200 mg capsule TAKE ONE CAPSULE BY MOUTH DAILY 90 Capsule 1    omeprazole (PRILOSEC) 40 mg capsule Take 1 Capsule by mouth daily. 90 Capsule 3    amLODIPine (NORVASC) 5 mg tablet TAKE ONE TABLET BY MOUTH DAILY 90 Tablet 3    atorvastatin (LIPITOR) 20 mg tablet TAKE ONE TABLET BY MOUTH DAILY 90 Tablet 3    glucosamine/chondr bryant A sod (glucosamine-chondroitin) 750-600 mg tab Take 1,500 mg by mouth two (2) times a day.  potassium 99 mg tablet Take 99 mg by mouth daily.  calcium-magnesium-zinc tab Take  by mouth daily.  acetaminophen (TYLENOL EXTRA STRENGTH) 500 mg tablet Take 1,000 mg by mouth every six (6) hours as needed for Pain.  cholecalciferol (VITAMIN D3) 1,000 unit tablet Take 2,500 Units by mouth daily.  ascorbic acid (VITAMIN C) 500 mg tablet Take  by mouth daily.  multivitamin (ONE A DAY) tablet Take 1 Tab by mouth daily.  cyanocobalamin 1,000 mcg tablet Take 1,000 mcg by mouth daily.        Allergies   Allergen Reactions    Cymbalta [Duloxetine] Other (comments)     [place] funny       Family History   Problem Relation Age of Onset    Other Father cardiomyopathy    Heart Disease Brother      Social History     Tobacco Use    Smoking status: Former Smoker     Quit date: 2004     Years since quittin.6    Smokeless tobacco: Never Used    Tobacco comment: 60+ py   Substance Use Topics    Alcohol use: No         Lashell Riley MD

## 2022-05-02 ENCOUNTER — OFFICE VISIT (OUTPATIENT)
Dept: INTERNAL MEDICINE CLINIC | Age: 67
End: 2022-05-02
Payer: MEDICARE

## 2022-05-02 VITALS
RESPIRATION RATE: 16 BRPM | DIASTOLIC BLOOD PRESSURE: 76 MMHG | WEIGHT: 191 LBS | BODY MASS INDEX: 27.35 KG/M2 | HEIGHT: 70 IN | TEMPERATURE: 97 F | SYSTOLIC BLOOD PRESSURE: 132 MMHG

## 2022-05-02 DIAGNOSIS — Z71.89 ADVANCED DIRECTIVES, COUNSELING/DISCUSSION: ICD-10-CM

## 2022-05-02 DIAGNOSIS — K21.9 GERD WITHOUT ESOPHAGITIS: ICD-10-CM

## 2022-05-02 DIAGNOSIS — Z00.00 MEDICARE ANNUAL WELLNESS VISIT, SUBSEQUENT: Primary | ICD-10-CM

## 2022-05-02 DIAGNOSIS — R73.01 IFG (IMPAIRED FASTING GLUCOSE): ICD-10-CM

## 2022-05-02 DIAGNOSIS — Z23 ENCOUNTER FOR IMMUNIZATION: ICD-10-CM

## 2022-05-02 DIAGNOSIS — Z12.5 SCREENING FOR MALIGNANT NEOPLASM OF PROSTATE: ICD-10-CM

## 2022-05-02 DIAGNOSIS — E78.5 HYPERLIPIDEMIA, UNSPECIFIED HYPERLIPIDEMIA TYPE: ICD-10-CM

## 2022-05-02 DIAGNOSIS — K57.30 DIVERTICULOSIS OF COLON WITHOUT HEMORRHAGE: ICD-10-CM

## 2022-05-02 DIAGNOSIS — D12.6 COLON ADENOMA: ICD-10-CM

## 2022-05-02 DIAGNOSIS — I25.10 CORONARY ARTERY DISEASE INVOLVING NATIVE CORONARY ARTERY OF NATIVE HEART WITHOUT ANGINA PECTORIS: ICD-10-CM

## 2022-05-02 DIAGNOSIS — I10 PRIMARY HYPERTENSION: ICD-10-CM

## 2022-05-02 PROCEDURE — G0463 HOSPITAL OUTPT CLINIC VISIT: HCPCS | Performed by: INTERNAL MEDICINE

## 2022-05-02 PROCEDURE — G8427 DOCREV CUR MEDS BY ELIG CLIN: HCPCS | Performed by: INTERNAL MEDICINE

## 2022-05-02 PROCEDURE — G8536 NO DOC ELDER MAL SCRN: HCPCS | Performed by: INTERNAL MEDICINE

## 2022-05-02 PROCEDURE — 3017F COLORECTAL CA SCREEN DOC REV: CPT | Performed by: INTERNAL MEDICINE

## 2022-05-02 PROCEDURE — 1101F PT FALLS ASSESS-DOCD LE1/YR: CPT | Performed by: INTERNAL MEDICINE

## 2022-05-02 PROCEDURE — 99497 ADVNCD CARE PLAN 30 MIN: CPT | Performed by: INTERNAL MEDICINE

## 2022-05-02 PROCEDURE — G8419 CALC BMI OUT NRM PARAM NOF/U: HCPCS | Performed by: INTERNAL MEDICINE

## 2022-05-02 PROCEDURE — 99214 OFFICE O/P EST MOD 30 MIN: CPT | Performed by: INTERNAL MEDICINE

## 2022-05-02 PROCEDURE — G0439 PPPS, SUBSEQ VISIT: HCPCS | Performed by: INTERNAL MEDICINE

## 2022-05-02 PROCEDURE — 90670 PCV13 VACCINE IM: CPT | Performed by: INTERNAL MEDICINE

## 2022-05-02 PROCEDURE — G8752 SYS BP LESS 140: HCPCS | Performed by: INTERNAL MEDICINE

## 2022-05-02 PROCEDURE — G8754 DIAS BP LESS 90: HCPCS | Performed by: INTERNAL MEDICINE

## 2022-05-02 PROCEDURE — G8510 SCR DEP NEG, NO PLAN REQD: HCPCS | Performed by: INTERNAL MEDICINE

## 2022-05-02 NOTE — PROGRESS NOTES
Jabier Freed is a 77 y.o. male who presents for routine immunizations. He denies any symptoms , reactions or allergies that would exclude them from being immunized today. Risks and adverse reactions were discussed and the VIS was given to them. All questions were addressed. He was observed for 5 min post injection. There were no reactions observed. Verbal order for PCV13 IM received per Ekta Akins MD for pt Jabier Freed. Order written down and read back to provider for verification prior to completion.

## 2022-05-02 NOTE — PATIENT INSTRUCTIONS
Medicare Wellness Visit, Male    The best way to live healthy is to have a lifestyle where you eat a well-balanced diet, exercise regularly, limit alcohol use, and quit all forms of tobacco/nicotine, if applicable. Regular preventive services are another way to keep healthy. Preventive services (vaccines, screening tests, monitoring & exams) can help personalize your care plan, which helps you manage your own care. Screening tests can find health problems at the earliest stages, when they are easiest to treat. [facility] follows the current, evidence-based guidelines published by the [facility] Sharan Sung ([facility]) when recommending preventive services for our patients. Because we follow these guidelines, sometimes recommendations change over time as research supports it. (For example, a prostate screening blood test is no longer routinely recommended for men with no symptoms). Of course, you and your doctor may decide to screen more often for some diseases, based on your risk and co-morbidities (chronic disease you are already diagnosed with). Preventive services for you include:  - Medicare offers their members a free annual wellness visit, which is time for you and your primary care provider to discuss and plan for your preventive service needs. Take advantage of this benefit every year!  -All adults over age 72 should receive the recommended pneumonia vaccines. Current USPSTF guidelines recommend a series of two vaccines for the best pneumonia protection.   -All adults should have a flu vaccine yearly and tetanus vaccine every 10 years.  -All adults age 48 and older should receive the shingles vaccines (series of two vaccines).        -All adults age 38-68 who are overweight should have a diabetes screening test once every three years.   -Other screening tests & preventive services for persons with diabetes include: an eye exam to screen for diabetic retinopathy, a kidney function test, a foot exam, and stricter control over your cholesterol.   -Cardiovascular screening for adults with routine risk involves an electrocardiogram (ECG) at intervals determined by the provider.   -Colorectal cancer screening should be done for adults age 54-65 with no increased risk factors for colorectal cancer. There are a number of acceptable methods of screening for this type of cancer. Each test has its own benefits and drawbacks. Discuss with your provider what is most appropriate for you during your annual wellness visit. The different tests include: colonoscopy (considered the best screening method), a fecal occult blood test, a fecal DNA test, and sigmoidoscopy.  -All adults born between [facility] should be screened once for Hepatitis C.  -An Abdominal Aortic Aneurysm (AAA) Screening is recommended for men age 73-68 who has ever smoked in their lifetime.      Here is a list of your current Health Maintenance items (your personalized list of preventive services) with a due date:  Health Maintenance Due   Topic Date Due    Shingles Vaccine (1 of 2) Never done    Pneumococcal Vaccine (1 - PCV) Never done    Depresssion Screening  04/29/2022

## 2022-05-02 NOTE — PROGRESS NOTES
Sarah Europe presents with   Chief Complaint   Patient presents with   Wilhelminia Blazing Annual Wellness Visit    Hypertension    Cholesterol Problem    Labs     4-26-22          1. \"Have you been to the ER, urgent care clinic since your last visit? Hospitalized since your last visit? \" YES, 7-13-21 for colonoscopy    2. \"Have you seen or consulted any other health care providers outside of the [address] [place] [address] since your last visit? \" No     3. For patients aged 39-70: Has the patient had a colonoscopy / FIT/ Cologuard?  Yes - no Care Gap present

## 2022-05-02 NOTE — ACP (ADVANCE CARE PLANNING)
Advance Care Planning     Advance Care Planning (ACP) Physician/NP/PA Conversation      Date of Conversation: 5/2/2022  Conducted with: Patient with Decision Making Capacity    Healthcare Decision Maker:     Primary Decision Maker: Corazon Griffiths - Spouse - [phone]  Click here to complete [address] including selection of the Healthcare Decision Maker Relationship (ie \"Primary\")        Today we documented Decision Maker(s) consistent with Legal Next of Kin hierarchy. Care Preferences:    Hospitalization: \"If your health worsens and it becomes clear that your chance of recovery is unlikely, what would be your preference regarding hospitalization? \"  The patient would prefer hospitalization. Ventilation: \"If you were unable to breathe on your own and your chance of recovery was unlikely, what would be your preference about the use of a ventilator (breathing machine) if it was available to you? \"   The patient would desire the use of a ventilator. Resuscitation: \"In the event your heart stopped as a result of an underlying serious health condition, would you want attempts to be made to restart your heart, or would you prefer a natural death? \"   Yes, attempt to resuscitate.     Additional topics discussed: ventilation preferences, hospitalization preferences and resuscitation preferences    Conversation Outcomes / Follow-Up Plan:   ACP in process - information provided, considering goals and options  Reviewed DNR/DNI and patient elects Full Code (Attempt Resuscitation)     Length of Voluntary ACP Conversation in minutes:  16 minutes    Sheryl Sanabria MD

## 2022-05-12 DIAGNOSIS — I25.10 CORONARY ARTERY DISEASE INVOLVING NATIVE CORONARY ARTERY OF NATIVE HEART WITHOUT ANGINA PECTORIS: ICD-10-CM

## 2022-05-12 DIAGNOSIS — E78.5 HYPERLIPIDEMIA, UNSPECIFIED HYPERLIPIDEMIA TYPE: ICD-10-CM

## 2022-05-12 DIAGNOSIS — I10 PRIMARY HYPERTENSION: ICD-10-CM

## 2022-05-12 RX ORDER — ATORVASTATIN CALCIUM 20 MG/1
TABLET, FILM COATED ORAL
Qty: 90 TABLET | Refills: 3 | Status: SHIPPED | OUTPATIENT
Start: 2022-05-12

## 2022-05-12 RX ORDER — CELECOXIB 200 MG/1
CAPSULE ORAL
Qty: 90 CAPSULE | Refills: 1 | Status: SHIPPED | OUTPATIENT
Start: 2022-05-12

## 2022-05-12 RX ORDER — AMLODIPINE BESYLATE 5 MG/1
TABLET ORAL
Qty: 90 TABLET | Refills: 3 | Status: SHIPPED | OUTPATIENT
Start: 2022-05-12

## 2022-08-10 DIAGNOSIS — K21.9 GERD WITHOUT ESOPHAGITIS: ICD-10-CM

## 2022-08-11 RX ORDER — OMEPRAZOLE 40 MG/1
CAPSULE, DELAYED RELEASE ORAL
Qty: 90 CAPSULE | Refills: 3 | Status: SHIPPED | OUTPATIENT
Start: 2022-08-11

## 2022-11-09 RX ORDER — CELECOXIB 200 MG/1
CAPSULE ORAL
Qty: 90 CAPSULE | Refills: 1 | Status: SHIPPED | OUTPATIENT
Start: 2022-11-09

## 2023-02-03 DIAGNOSIS — Z00.00 ENCOUNTER FOR ANNUAL PHYSICAL EXAM: Primary | ICD-10-CM

## 2023-02-03 DIAGNOSIS — R73.03 PREDIABETES: ICD-10-CM

## 2023-02-03 DIAGNOSIS — E78.5 HYPERLIPIDEMIA, UNSPECIFIED HYPERLIPIDEMIA TYPE: ICD-10-CM

## 2023-02-04 DIAGNOSIS — E78.5 HYPERLIPIDEMIA, UNSPECIFIED HYPERLIPIDEMIA TYPE: ICD-10-CM

## 2023-02-04 DIAGNOSIS — R73.03 PREDIABETES: ICD-10-CM

## 2023-02-04 DIAGNOSIS — Z00.00 ENCOUNTER FOR ANNUAL PHYSICAL EXAM: Primary | ICD-10-CM

## 2023-02-06 DIAGNOSIS — E78.5 HYPERLIPIDEMIA, UNSPECIFIED HYPERLIPIDEMIA TYPE: ICD-10-CM

## 2023-02-06 DIAGNOSIS — Z00.00 ENCOUNTER FOR ANNUAL PHYSICAL EXAM: Primary | ICD-10-CM

## 2023-02-06 DIAGNOSIS — R73.03 PREDIABETES: ICD-10-CM

## 2023-04-25 NOTE — TELEPHONE ENCOUNTER
Last OV: 5/2/2022  Next OV: 5/17/2023  Last refill:  Amlodipine 5/12/2022  Atorvastatin 5/12/2022  Celebrex 11/9/2022

## 2023-04-28 RX ORDER — ATORVASTATIN CALCIUM 20 MG/1
TABLET, FILM COATED ORAL
Qty: 90 TABLET | Refills: 2 | Status: SHIPPED | OUTPATIENT
Start: 2023-04-28

## 2023-04-28 RX ORDER — CELECOXIB 200 MG/1
CAPSULE ORAL
Qty: 90 CAPSULE | Refills: 2 | Status: SHIPPED | OUTPATIENT
Start: 2023-04-28

## 2023-04-28 RX ORDER — AMLODIPINE BESYLATE 5 MG/1
TABLET ORAL
Qty: 90 TABLET | Refills: 2 | Status: SHIPPED | OUTPATIENT
Start: 2023-04-28

## 2023-05-15 ENCOUNTER — HOSPITAL ENCOUNTER (OUTPATIENT)
Facility: HOSPITAL | Age: 68
Discharge: HOME | End: 2023-05-18
Payer: MEDICARE

## 2023-05-15 LAB
ALBUMIN SERPL-MCNC: 3.8 G/DL (ref 3.4–5)
ALBUMIN/GLOB SERPL: 1.3 (ref 0.8–1.7)
ALP SERPL-CCNC: 73 U/L (ref 45–117)
ALT SERPL-CCNC: 50 U/L (ref 16–61)
ANION GAP SERPL CALC-SCNC: 4 MMOL/L (ref 3–18)
AST SERPL-CCNC: 22 U/L (ref 10–38)
BILIRUB SERPL-MCNC: 1 MG/DL (ref 0.2–1)
BUN SERPL-MCNC: 23 MG/DL (ref 7–18)
BUN/CREAT SERPL: 23 (ref 12–20)
CALCIUM SERPL-MCNC: 8.9 MG/DL (ref 8.5–10.1)
CHLORIDE SERPL-SCNC: 109 MMOL/L (ref 100–111)
CHOLEST SERPL-MCNC: 92 MG/DL
CO2 SERPL-SCNC: 29 MMOL/L (ref 21–32)
CREAT SERPL-MCNC: 0.98 MG/DL (ref 0.6–1.3)
ERYTHROCYTE [DISTWIDTH] IN BLOOD BY AUTOMATED COUNT: 12.3 % (ref 11.6–14.5)
EST. AVERAGE GLUCOSE BLD GHB EST-MCNC: 123 MG/DL
GLOBULIN SER CALC-MCNC: 3 G/DL (ref 2–4)
GLUCOSE SERPL-MCNC: 112 MG/DL (ref 74–99)
HBA1C MFR BLD: 5.9 % (ref 4.2–5.6)
HCT VFR BLD AUTO: 43.2 % (ref 36–48)
HDLC SERPL-MCNC: 41 MG/DL (ref 40–60)
HDLC SERPL: 2.2 (ref 0–5)
HGB BLD-MCNC: 14.6 G/DL (ref 13–16)
LDLC SERPL CALC-MCNC: 38.2 MG/DL (ref 0–100)
LIPID PANEL: NORMAL
MCH RBC QN AUTO: 32 PG (ref 24–34)
MCHC RBC AUTO-ENTMCNC: 33.8 G/DL (ref 31–37)
MCV RBC AUTO: 94.7 FL (ref 78–100)
NRBC # BLD: 0 K/UL (ref 0–0.01)
NRBC BLD-RTO: 0 PER 100 WBC
PLATELET # BLD AUTO: 254 K/UL (ref 135–420)
PMV BLD AUTO: 9.4 FL (ref 9.2–11.8)
POTASSIUM SERPL-SCNC: 4.5 MMOL/L (ref 3.5–5.5)
PROT SERPL-MCNC: 6.8 G/DL (ref 6.4–8.2)
PSA SERPL-MCNC: 1.2 NG/ML (ref 0–4)
RBC # BLD AUTO: 4.56 M/UL (ref 4.35–5.65)
SODIUM SERPL-SCNC: 142 MMOL/L (ref 136–145)
TRIGL SERPL-MCNC: 64 MG/DL
VLDLC SERPL CALC-MCNC: 12.8 MG/DL
WBC # BLD AUTO: 8 K/UL (ref 4.6–13.2)

## 2023-05-15 PROCEDURE — 80053 COMPREHEN METABOLIC PANEL: CPT

## 2023-05-15 PROCEDURE — 80061 LIPID PANEL: CPT

## 2023-05-15 PROCEDURE — 36415 COLL VENOUS BLD VENIPUNCTURE: CPT

## 2023-05-15 PROCEDURE — 83036 HEMOGLOBIN GLYCOSYLATED A1C: CPT

## 2023-05-15 PROCEDURE — 84153 ASSAY OF PSA TOTAL: CPT

## 2023-05-15 PROCEDURE — 85027 COMPLETE CBC AUTOMATED: CPT

## 2023-05-16 RX ORDER — AMOXICILLIN 500 MG/1
CAPSULE ORAL
COMMUNITY
Start: 2023-04-03

## 2023-05-16 RX ORDER — OXYCODONE HYDROCHLORIDE 5 MG/1
TABLET ORAL
COMMUNITY
Start: 2023-04-03

## 2023-05-16 NOTE — PROGRESS NOTES
Do Chung presents today for   Chief Complaint   Patient presents with    Medicare AWV     5-15-23    Hypertension    Hyperlipidemia    Blood Sugar Problem    Gastroesophageal Reflux       1. \"Have you been to the ER, urgent care clinic since your last visit? Hospitalized since your last visit? \" no    2. \"Have you seen or consulted any other health care providers outside of the [address] [place] [address] since your last visit? \" no     3. For patients aged 39-70: Has the patient had a colonoscopy / FIT/ Cologuard? Yes - no Care Gap present      If the patient is female:    4. For patients aged 41-77: Has the patient had a mammogram within the past 2 years? NA - based on age or sex      11. For patients aged 21-65: Has the patient had a pap smear?  NA - based on age or sex

## 2023-05-17 ENCOUNTER — OFFICE VISIT (OUTPATIENT)
Age: 68
End: 2023-05-17
Payer: MEDICARE

## 2023-05-17 VITALS
HEART RATE: 59 BPM | BODY MASS INDEX: 27.77 KG/M2 | OXYGEN SATURATION: 98 % | SYSTOLIC BLOOD PRESSURE: 126 MMHG | TEMPERATURE: 98.1 F | WEIGHT: 194 LBS | RESPIRATION RATE: 19 BRPM | DIASTOLIC BLOOD PRESSURE: 71 MMHG | HEIGHT: 70 IN

## 2023-05-17 DIAGNOSIS — E78.5 HYPERLIPIDEMIA, UNSPECIFIED HYPERLIPIDEMIA TYPE: ICD-10-CM

## 2023-05-17 DIAGNOSIS — D12.6 COLON ADENOMA: ICD-10-CM

## 2023-05-17 DIAGNOSIS — I25.10 CORONARY ARTERY DISEASE INVOLVING NATIVE CORONARY ARTERY OF NATIVE HEART WITHOUT ANGINA PECTORIS: ICD-10-CM

## 2023-05-17 DIAGNOSIS — Z71.89 ADVANCED CARE PLANNING/COUNSELING DISCUSSION: ICD-10-CM

## 2023-05-17 DIAGNOSIS — I10 PRIMARY HYPERTENSION: ICD-10-CM

## 2023-05-17 DIAGNOSIS — Z00.00 MEDICARE ANNUAL WELLNESS VISIT, SUBSEQUENT: Primary | ICD-10-CM

## 2023-05-17 DIAGNOSIS — R73.01 IFG (IMPAIRED FASTING GLUCOSE): ICD-10-CM

## 2023-05-17 DIAGNOSIS — K57.30 DIVERTICULOSIS OF COLON WITHOUT HEMORRHAGE: ICD-10-CM

## 2023-05-17 LAB
HEMOCCULT STL QL: NEGATIVE
VALID INTERNAL CONTROL: YES

## 2023-05-17 PROCEDURE — 3074F SYST BP LT 130 MM HG: CPT | Performed by: INTERNAL MEDICINE

## 2023-05-17 PROCEDURE — G8419 CALC BMI OUT NRM PARAM NOF/U: HCPCS | Performed by: INTERNAL MEDICINE

## 2023-05-17 PROCEDURE — 99214 OFFICE O/P EST MOD 30 MIN: CPT | Performed by: INTERNAL MEDICINE

## 2023-05-17 PROCEDURE — PBSHW AMB POC FECAL BLOOD, OCCULT, QL 1 CARD: Performed by: INTERNAL MEDICINE

## 2023-05-17 PROCEDURE — 3078F DIAST BP <80 MM HG: CPT | Performed by: INTERNAL MEDICINE

## 2023-05-17 PROCEDURE — 3017F COLORECTAL CA SCREEN DOC REV: CPT | Performed by: INTERNAL MEDICINE

## 2023-05-17 PROCEDURE — 1123F ACP DISCUSS/DSCN MKR DOCD: CPT | Performed by: INTERNAL MEDICINE

## 2023-05-17 PROCEDURE — 1036F TOBACCO NON-USER: CPT | Performed by: INTERNAL MEDICINE

## 2023-05-17 PROCEDURE — G8427 DOCREV CUR MEDS BY ELIG CLIN: HCPCS | Performed by: INTERNAL MEDICINE

## 2023-05-17 PROCEDURE — 99497 ADVNCD CARE PLAN 30 MIN: CPT | Performed by: INTERNAL MEDICINE

## 2023-05-17 PROCEDURE — G0439 PPPS, SUBSEQ VISIT: HCPCS | Performed by: INTERNAL MEDICINE

## 2023-05-17 PROCEDURE — 82272 OCCULT BLD FECES 1-3 TESTS: CPT | Performed by: INTERNAL MEDICINE

## 2023-05-17 PROCEDURE — 99211 OFF/OP EST MAY X REQ PHY/QHP: CPT

## 2023-05-17 SDOH — ECONOMIC STABILITY: HOUSING INSECURITY
IN THE LAST 12 MONTHS, WAS THERE A TIME WHEN YOU DID NOT HAVE A STEADY PLACE TO SLEEP OR SLEPT IN A SHELTER (INCLUDING NOW)?: NO

## 2023-05-17 SDOH — ECONOMIC STABILITY: FOOD INSECURITY: WITHIN THE PAST 12 MONTHS, YOU WORRIED THAT YOUR FOOD WOULD RUN OUT BEFORE YOU GOT MONEY TO BUY MORE.: NEVER TRUE

## 2023-05-17 SDOH — ECONOMIC STABILITY: INCOME INSECURITY: HOW HARD IS IT FOR YOU TO PAY FOR THE VERY BASICS LIKE FOOD, HOUSING, MEDICAL CARE, AND HEATING?: NOT HARD AT ALL

## 2023-05-17 SDOH — ECONOMIC STABILITY: FOOD INSECURITY: WITHIN THE PAST 12 MONTHS, THE FOOD YOU BOUGHT JUST DIDN'T LAST AND YOU DIDN'T HAVE MONEY TO GET MORE.: NEVER TRUE

## 2023-05-17 ASSESSMENT — PATIENT HEALTH QUESTIONNAIRE - PHQ9
SUM OF ALL RESPONSES TO PHQ QUESTIONS 1-9: 0
1. LITTLE INTEREST OR PLEASURE IN DOING THINGS: 0
SUM OF ALL RESPONSES TO PHQ QUESTIONS 1-9: 0
2. FEELING DOWN, DEPRESSED OR HOPELESS: 0
SUM OF ALL RESPONSES TO PHQ9 QUESTIONS 1 & 2: 0
SUM OF ALL RESPONSES TO PHQ QUESTIONS 1-9: 0
1. LITTLE INTEREST OR PLEASURE IN DOING THINGS: 0
2. FEELING DOWN, DEPRESSED OR HOPELESS: 0
SUM OF ALL RESPONSES TO PHQ9 QUESTIONS 1 & 2: 0
SUM OF ALL RESPONSES TO PHQ QUESTIONS 1-9: 0

## 2023-05-17 ASSESSMENT — LIFESTYLE VARIABLES
HOW OFTEN DO YOU HAVE A DRINK CONTAINING ALCOHOL: NEVER
HOW MANY STANDARD DRINKS CONTAINING ALCOHOL DO YOU HAVE ON A TYPICAL DAY: PATIENT DOES NOT DRINK

## 2023-05-18 RX ORDER — ROSUVASTATIN CALCIUM 10 MG/1
10 TABLET, COATED ORAL NIGHTLY
Qty: 30 TABLET | Refills: 5 | Status: SHIPPED | OUTPATIENT
Start: 2023-05-18

## 2023-05-18 NOTE — PROGRESS NOTES
Medicare Annual Wellness Visit    Yassine Truong is here for Medicare AWV (5-15-23), Hypertension, Hyperlipidemia, Blood Sugar Problem, and Gastroesophageal Reflux    Assessment & Plan   Hyperlipidemia, unspecified hyperlipidemia type  -     rosuvastatin (CRESTOR) 10 MG tablet; Take 1 tablet by mouth nightly, Disp-30 tablet, R-5Normal  -     Lipid Panel; Future  -     Hepatic Function Panel; Future  Advanced care planning/counseling discussion  Coronary artery disease involving native coronary artery of native heart without angina pectoris  Primary hypertension  Colon adenoma  IFG (impaired fasting glucose)  Diverticulosis of colon without hemorrhage  -     AMB POC FECAL BLOOD, OCCULT, QL 1 CARD  Medicare annual wellness visit, subsequent      Recommendations for Preventive Services Due: see orders and patient instructions/AVS.  Recommended screening schedule for the next 5-10 years is provided to the patient in written form: see Patient Instructions/AVS.     Return for Medicare Annual Wellness Visit in 1 year. Subjective       Patient's complete Health Risk Assessment and screening values have been reviewed and are found in Flowsheets. The following problems were reviewed today and where indicated follow up appointments were made and/or referrals ordered. Positive Risk Factor Screenings with Interventions:                Opioid Risk: (Low risk score <55) Opioid risk score: 10    Patient is low risk for opioid use disorder or overdose. Last PDMP Get as Reviewed:  Review User Review Instant Review Result                          Advanced Directives:  Do you have a Living Will?: (!) No    Intervention:                         Objective   Vitals:    05/17/23 0923   BP: 126/71   Pulse: 59   Resp: 19   Temp: 98.1 °F (36.7 °C)   TempSrc: Temporal   SpO2: 98%   Weight: 194 lb (88 kg)   Height: 5' 10\" (1.778 m)      Body mass index is 27.84 kg/m².                Allergies   Allergen Reactions    Duloxetine

## 2023-07-25 RX ORDER — OMEPRAZOLE 40 MG/1
CAPSULE, DELAYED RELEASE ORAL
Qty: 90 CAPSULE | Refills: 2 | Status: SHIPPED | OUTPATIENT
Start: 2023-07-25

## 2023-09-11 ENCOUNTER — HOSPITAL ENCOUNTER (OUTPATIENT)
Facility: HOSPITAL | Age: 68
Discharge: HOME | End: 2023-09-14
Payer: MEDICARE

## 2023-09-11 DIAGNOSIS — E78.5 HYPERLIPIDEMIA, UNSPECIFIED HYPERLIPIDEMIA TYPE: ICD-10-CM

## 2023-09-11 LAB
ALBUMIN SERPL-MCNC: 3.8 G/DL (ref 3.4–5)
ALBUMIN/GLOB SERPL: 1.2 (ref 0.8–1.7)
ALP SERPL-CCNC: 79 U/L (ref 45–117)
ALT SERPL-CCNC: 60 U/L (ref 16–61)
AST SERPL-CCNC: 29 U/L (ref 10–38)
BILIRUB DIRECT SERPL-MCNC: 0.2 MG/DL (ref 0–0.2)
BILIRUB SERPL-MCNC: 0.7 MG/DL (ref 0.2–1)
CHOLEST SERPL-MCNC: 123 MG/DL
GLOBULIN SER CALC-MCNC: 3.3 G/DL (ref 2–4)
HDLC SERPL-MCNC: 48 MG/DL (ref 40–60)
HDLC SERPL: 2.6 (ref 0–5)
LDLC SERPL CALC-MCNC: 56.2 MG/DL (ref 0–100)
LIPID PANEL: NORMAL
PROT SERPL-MCNC: 7.1 G/DL (ref 6.4–8.2)
TRIGL SERPL-MCNC: 94 MG/DL
VLDLC SERPL CALC-MCNC: 18.8 MG/DL

## 2023-09-11 PROCEDURE — 80061 LIPID PANEL: CPT

## 2023-09-11 PROCEDURE — 80076 HEPATIC FUNCTION PANEL: CPT

## 2023-09-11 PROCEDURE — 36415 COLL VENOUS BLD VENIPUNCTURE: CPT

## 2023-09-18 ENCOUNTER — PATIENT MESSAGE (OUTPATIENT)
Age: 68
End: 2023-09-18

## 2023-09-18 ENCOUNTER — TELEPHONE (OUTPATIENT)
Age: 68
End: 2023-09-18

## 2023-09-18 ENCOUNTER — HOSPITAL ENCOUNTER (OUTPATIENT)
Facility: HOSPITAL | Age: 68
Discharge: HOME | End: 2023-09-21
Payer: MEDICARE

## 2023-09-18 ENCOUNTER — OFFICE VISIT (OUTPATIENT)
Age: 68
End: 2023-09-18
Payer: MEDICARE

## 2023-09-18 VITALS
HEART RATE: 57 BPM | SYSTOLIC BLOOD PRESSURE: 137 MMHG | OXYGEN SATURATION: 94 % | WEIGHT: 199 LBS | BODY MASS INDEX: 28.49 KG/M2 | TEMPERATURE: 97.9 F | HEIGHT: 70 IN | RESPIRATION RATE: 16 BRPM | DIASTOLIC BLOOD PRESSURE: 89 MMHG

## 2023-09-18 DIAGNOSIS — M47.16 OSTEOARTHRITIS OF LUMBAR SPINE WITH MYELOPATHY: ICD-10-CM

## 2023-09-18 DIAGNOSIS — M25.552 PAIN OF LEFT HIP: Primary | ICD-10-CM

## 2023-09-18 DIAGNOSIS — R26.89 BALANCE PROBLEM: ICD-10-CM
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
## 2023-09-18 DIAGNOSIS — G62.9 NEUROPATHY: ICD-10-CM

## 2023-09-18 DIAGNOSIS — Z91.81 AT HIGH RISK FOR FALLS: ICD-10-CM

## 2023-09-18 DIAGNOSIS — M25.552 PAIN OF LEFT HIP: ICD-10-CM

## 2023-09-18 PROCEDURE — 73502 X-RAY EXAM HIP UNI 2-3 VIEWS: CPT

## 2023-09-18 PROCEDURE — G8419 CALC BMI OUT NRM PARAM NOF/U: HCPCS | Performed by: INTERNAL MEDICINE

## 2023-09-18 PROCEDURE — 1036F TOBACCO NON-USER: CPT | Performed by: INTERNAL MEDICINE

## 2023-09-18 PROCEDURE — 1123F ACP DISCUSS/DSCN MKR DOCD: CPT | Performed by: INTERNAL MEDICINE

## 2023-09-18 PROCEDURE — 3017F COLORECTAL CA SCREEN DOC REV: CPT | Performed by: INTERNAL MEDICINE

## 2023-09-18 PROCEDURE — 3079F DIAST BP 80-89 MM HG: CPT | Performed by: INTERNAL MEDICINE

## 2023-09-18 PROCEDURE — 3075F SYST BP GE 130 - 139MM HG: CPT | Performed by: INTERNAL MEDICINE

## 2023-09-18 PROCEDURE — G8427 DOCREV CUR MEDS BY ELIG CLIN: HCPCS | Performed by: INTERNAL MEDICINE

## 2023-09-18 PROCEDURE — 99214 OFFICE O/P EST MOD 30 MIN: CPT | Performed by: INTERNAL MEDICINE

## 2023-09-18 ASSESSMENT — PATIENT HEALTH QUESTIONNAIRE - PHQ9
2. FEELING DOWN, DEPRESSED OR HOPELESS: 0
SUM OF ALL RESPONSES TO PHQ QUESTIONS 1-9: 0
SUM OF ALL RESPONSES TO PHQ9 QUESTIONS 1 & 2: 0
SUM OF ALL RESPONSES TO PHQ QUESTIONS 1-9: 0
SUM OF ALL RESPONSES TO PHQ QUESTIONS 1-9: 0
1. LITTLE INTEREST OR PLEASURE IN DOING THINGS: 0
SUM OF ALL RESPONSES TO PHQ QUESTIONS 1-9: 0

## 2023-09-18 NOTE — TELEPHONE ENCOUNTER
This RN sent patient a message via My Chart to provide him with the office numbers for Dr. Phan/Neurology and Dr. Tamela Harmon/Orthopaedics. He was asked to call both doctors tomorrow to schedule his first appointment.

## 2023-09-18 NOTE — TELEPHONE ENCOUNTER
This RN called and spoke with patient to inform him that he had been referred to a Dr. Phan/Neurology and Dr. Smith Harmon/Orthopaedics. He was asked to call both doctors tomorrow to schedule his first appointment.

## 2023-09-21 ENCOUNTER — HOSPITAL ENCOUNTER (EMERGENCY)
Facility: HOSPITAL | Age: 68
Discharge: HOME | End: 2023-09-22
Attending: EMERGENCY MEDICINE
Payer: MEDICARE

## 2023-09-21 ENCOUNTER — CLINICAL DOCUMENTATION (OUTPATIENT)
Age: 68
End: 2023-09-21

## 2023-09-21 ENCOUNTER — TELEPHONE (OUTPATIENT)
Age: 68
End: 2023-09-21

## 2023-09-21 DIAGNOSIS — N20.0 KIDNEY STONE: Primary | ICD-10-CM

## 2023-09-21 LAB
ALBUMIN SERPL-MCNC: 3.9 G/DL (ref 3.4–5)
ALBUMIN/GLOB SERPL: 1.1 (ref 0.8–1.7)
ALP SERPL-CCNC: 89 U/L (ref 45–117)
ALT SERPL-CCNC: 49 U/L (ref 16–61)
ANION GAP SERPL CALC-SCNC: 3 MMOL/L (ref 3–18)
AST SERPL-CCNC: 18 U/L (ref 10–38)
BASOPHILS # BLD: 0 K/UL (ref 0–0.1)
BASOPHILS NFR BLD: 0 % (ref 0–2)
BILIRUB SERPL-MCNC: 0.8 MG/DL (ref 0.2–1)
BUN SERPL-MCNC: 24 MG/DL (ref 7–18)
BUN/CREAT SERPL: 19 (ref 12–20)
CALCIUM SERPL-MCNC: 8.9 MG/DL (ref 8.5–10.1)
CHLORIDE SERPL-SCNC: 107 MMOL/L (ref 100–111)
CO2 SERPL-SCNC: 28 MMOL/L (ref 21–32)
CREAT SERPL-MCNC: 1.25 MG/DL (ref 0.6–1.3)
DIFFERENTIAL METHOD BLD: ABNORMAL
EOSINOPHIL # BLD: 0.1 K/UL (ref 0–0.4)
EOSINOPHIL NFR BLD: 1 % (ref 0–5)
ERYTHROCYTE [DISTWIDTH] IN BLOOD BY AUTOMATED COUNT: 12.1 % (ref 11.6–14.5)
GLOBULIN SER CALC-MCNC: 3.6 G/DL (ref 2–4)
GLUCOSE SERPL-MCNC: 184 MG/DL (ref 74–99)
HCT VFR BLD AUTO: 44.8 % (ref 36–48)
HGB BLD-MCNC: 15.3 G/DL (ref 13–16)
IMM GRANULOCYTES # BLD AUTO: 0.1 K/UL (ref 0–0.04)
IMM GRANULOCYTES NFR BLD AUTO: 1 % (ref 0–0.5)
LIPASE SERPL-CCNC: 68 U/L (ref 73–393)
LYMPHOCYTES # BLD: 1.3 K/UL (ref 0.9–3.6)
LYMPHOCYTES NFR BLD: 13 % (ref 21–52)
MCH RBC QN AUTO: 32.1 PG (ref 24–34)
MCHC RBC AUTO-ENTMCNC: 34.2 G/DL (ref 31–37)
MCV RBC AUTO: 93.9 FL (ref 78–100)
MONOCYTES # BLD: 0.5 K/UL (ref 0.05–1.2)
MONOCYTES NFR BLD: 5 % (ref 3–10)
NEUTS SEG # BLD: 8.5 K/UL (ref 1.8–8)
NEUTS SEG NFR BLD: 80 % (ref 40–73)
NRBC # BLD: 0 K/UL (ref 0–0.01)
NRBC BLD-RTO: 0 PER 100 WBC
PLATELET # BLD AUTO: 218 K/UL (ref 135–420)
PMV BLD AUTO: 9.2 FL (ref 9.2–11.8)
POTASSIUM SERPL-SCNC: 4.3 MMOL/L (ref 3.5–5.5)
PROT SERPL-MCNC: 7.5 G/DL (ref 6.4–8.2)
RBC # BLD AUTO: 4.77 M/UL (ref 4.35–5.65)
SODIUM SERPL-SCNC: 138 MMOL/L (ref 136–145)
WBC # BLD AUTO: 10.5 K/UL (ref 4.6–13.2)

## 2023-09-21 PROCEDURE — 96374 THER/PROPH/DIAG INJ IV PUSH: CPT

## 2023-09-21 PROCEDURE — 96375 TX/PRO/DX INJ NEW DRUG ADDON: CPT

## 2023-09-21 PROCEDURE — 80053 COMPREHEN METABOLIC PANEL: CPT

## 2023-09-21 PROCEDURE — 83690 ASSAY OF LIPASE: CPT

## 2023-09-21 PROCEDURE — 85025 COMPLETE CBC W/AUTO DIFF WBC: CPT

## 2023-09-21 PROCEDURE — 96376 TX/PRO/DX INJ SAME DRUG ADON: CPT

## 2023-09-21 PROCEDURE — 99285 EMERGENCY DEPT VISIT HI MDM: CPT

## 2023-09-21 ASSESSMENT — PAIN DESCRIPTION - PAIN TYPE: TYPE: ACUTE PAIN

## 2023-09-21 ASSESSMENT — LIFESTYLE VARIABLES
HOW MANY STANDARD DRINKS CONTAINING ALCOHOL DO YOU HAVE ON A TYPICAL DAY: PATIENT DOES NOT DRINK
HOW OFTEN DO YOU HAVE A DRINK CONTAINING ALCOHOL: NEVER

## 2023-09-21 ASSESSMENT — PAIN DESCRIPTION - LOCATION: LOCATION: ABDOMEN

## 2023-09-21 ASSESSMENT — PAIN DESCRIPTION - FREQUENCY: FREQUENCY: CONTINUOUS

## 2023-09-21 ASSESSMENT — PAIN DESCRIPTION - ORIENTATION: ORIENTATION: RIGHT

## 2023-09-21 ASSESSMENT — PAIN - FUNCTIONAL ASSESSMENT: PAIN_FUNCTIONAL_ASSESSMENT: 0-10

## 2023-09-21 ASSESSMENT — PAIN SCALES - GENERAL: PAINLEVEL_OUTOF10: 9

## 2023-09-21 ASSESSMENT — PAIN DESCRIPTION - DESCRIPTORS: DESCRIPTORS: SHARP

## 2023-09-21 NOTE — TELEPHONE ENCOUNTER
This RN sent a message via My Chart to inform patient that Dr. Param Camacho had also ordered a referral for him to see Dr. Dagmar Crowley and an order for hip x-ray. I asked that patient  call Dr. Henry Gautam office o) [phone] to schedule his appointment. Patient was referred to the [facility] to get his hip x-ray.

## 2023-09-21 NOTE — TELEPHONE ENCOUNTER
This RN left a voice mail for patient to say that Dr. Param Camacho made another referral and that I would send a message to My Chart to let him know where he was being referred.

## 2023-09-22 ENCOUNTER — APPOINTMENT (OUTPATIENT)
Facility: HOSPITAL | Age: 68
End: 2023-09-22
Payer: MEDICARE

## 2023-09-22 VITALS
TEMPERATURE: 97.9 F | OXYGEN SATURATION: 94 % | HEART RATE: 70 BPM | RESPIRATION RATE: 18 BRPM | DIASTOLIC BLOOD PRESSURE: 74 MMHG | SYSTOLIC BLOOD PRESSURE: 151 MMHG

## 2023-09-22 PROCEDURE — 2580000003 HC RX 258: Performed by: EMERGENCY MEDICINE

## 2023-09-22 PROCEDURE — 74177 CT ABD & PELVIS W/CONTRAST: CPT

## 2023-09-22 PROCEDURE — 6360000004 HC RX CONTRAST MEDICATION: Performed by: EMERGENCY MEDICINE

## 2023-09-22 PROCEDURE — 6360000002 HC RX W HCPCS: Performed by: EMERGENCY MEDICINE

## 2023-09-22 RX ORDER — MORPHINE SULFATE 2 MG/ML
2 INJECTION, SOLUTION INTRAMUSCULAR; INTRAVENOUS
Status: COMPLETED | OUTPATIENT
Start: 2023-09-22 | End: 2023-09-22

## 2023-09-22 RX ORDER — 0.9 % SODIUM CHLORIDE 0.9 %
1000 INTRAVENOUS SOLUTION INTRAVENOUS ONCE
Status: COMPLETED | OUTPATIENT
Start: 2023-09-22 | End: 2023-09-22

## 2023-09-22 RX ORDER — ONDANSETRON 2 MG/ML
4 INJECTION INTRAMUSCULAR; INTRAVENOUS
Status: COMPLETED | OUTPATIENT
Start: 2023-09-22 | End: 2023-09-22

## 2023-09-22 RX ORDER — OXYCODONE HYDROCHLORIDE AND ACETAMINOPHEN 5; 325 MG/1; MG/1
1 TABLET ORAL EVERY 6 HOURS PRN
Qty: 20 TABLET | Refills: 0 | Status: SHIPPED | OUTPATIENT
Start: 2023-09-22 | End: 2023-09-29

## 2023-09-22 RX ORDER — ONDANSETRON 4 MG/1
4 TABLET, FILM COATED ORAL 3 TIMES DAILY PRN
Qty: 15 TABLET | Refills: 0 | Status: SHIPPED | OUTPATIENT
Start: 2023-09-22

## 2023-09-22 RX ORDER — MORPHINE SULFATE 4 MG/ML
4 INJECTION, SOLUTION INTRAMUSCULAR; INTRAVENOUS
Status: COMPLETED | OUTPATIENT
Start: 2023-09-22 | End: 2023-09-22

## 2023-09-22 RX ADMIN — IOPAMIDOL 100 ML: 612 INJECTION, SOLUTION INTRAVENOUS at 02:10

## 2023-09-22 RX ADMIN — ONDANSETRON 4 MG: 2 INJECTION INTRAMUSCULAR; INTRAVENOUS at 00:43

## 2023-09-22 RX ADMIN — MORPHINE SULFATE 4 MG: 4 INJECTION, SOLUTION INTRAMUSCULAR; INTRAVENOUS at 00:46

## 2023-09-22 RX ADMIN — SODIUM CHLORIDE 1000 ML: 900 INJECTION, SOLUTION INTRAVENOUS at 00:35

## 2023-09-22 RX ADMIN — MORPHINE SULFATE 2 MG: 2 INJECTION, SOLUTION INTRAMUSCULAR; INTRAVENOUS at 01:38

## 2023-09-22 ASSESSMENT — PAIN SCALES - GENERAL
PAINLEVEL_OUTOF10: 10
PAINLEVEL_OUTOF10: 9

## 2023-09-22 ASSESSMENT — PAIN DESCRIPTION - ORIENTATION
ORIENTATION: RIGHT
ORIENTATION: RIGHT;LOWER

## 2023-09-22 ASSESSMENT — ENCOUNTER SYMPTOMS
RESPIRATORY NEGATIVE: 1
ABDOMINAL PAIN: 1

## 2023-09-22 ASSESSMENT — PAIN DESCRIPTION - LOCATION
LOCATION: ABDOMEN
LOCATION: ABDOMEN

## 2023-09-22 ASSESSMENT — PAIN DESCRIPTION - DESCRIPTORS
DESCRIPTORS: ACHING
DESCRIPTORS: ACHING

## 2023-09-22 NOTE — ED NOTES
Administered medication and educated patient on side effects. Patient allergies verified. All questions and concerns answered. Pt instructed to use call bell at the bedside if needed. This nurse will continues to monitor pt at this time. Side rails up.         Katherin Coe RN  09/22/23 2424

## 2023-09-22 NOTE — ED PROVIDER NOTES
`[facility] EMERG[facility]Y DEPT  eMERGENCY dEPARTMENT eNCOUnter      Pt Name: Ami Zeng  MRN: [id number]  [facility] 1955 of evaluation: 9/21/2023  Provider:Kenneth B. Caryle Colonel, MD    CHIEF COMPLAINT       HPI    Ami Zeng is a 76 y.o. male  c/o a sudden onset of RLQ abdominal pain accompanied with N/V. No diarrhea. ROS    Review of Systems   Constitutional: Negative. HENT: Negative. Respiratory: Negative. Cardiovascular: Negative. Gastrointestinal:  Positive for abdominal pain. Hematological:  Bruises/bleeds easily: RLQ and RUQ abdominal pain. All other systems reviewed and are negative. Except as noted above the remainder of the review of systems was reviewed and negative. 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 Jayna Perez liver 11/00    Fib-4 was 0.54 from 10/14; US 2/18 showed fatty liver and hepatomegaly    GERD (gastroesophageal reflux disease)     and HH on UGI 11/00    H/O echocardiogram     mild cLVH, ef 65%, no wma abn, nl valves (2/19)    Hip bursitis 2015    Dr Brooks Terrazas    History of cardiac cath 2000    nl, EF 60% Dr. Samuel Sal    Hyperlipidemia     calculated 10 yr risk score 15.2% (2/19)    Hypertension     IFG (impaired fasting glucose) 2011    Osteoarthritis, knee 2015    Dr Brooks Terrazas    Renal cysts, acquired, bilateral 02/2018    Us showed 1.4cm cyst right, 7 cm and 2.5 cm cysts on left    Rhinophyma          SURGICAL HISTORY       Past Surgical History:   Procedure Laterality Date    COLONOSCOPY N/A 7/13/2021    Dr. Tonia Norwood (10/10) divertics;

## 2023-09-22 NOTE — ED TRIAGE NOTES
Patient A/O x 4, presented to the ED with complaint of right sided abdominal pain x 2 hour ago with nausea.

## 2023-09-22 NOTE — ED NOTES
Patient discharged at this time. This RN reviewed discharge instructions at this time with the patient. patient verbalized understanding and does not have any questions. Pt ambulatory upon discharge, & in stable condition. Pt armband removed & shredded. Patient I.V was discharged.        Cassandra West RN  09/22/23 9775

## 2023-09-26 ENCOUNTER — TELEPHONE (OUTPATIENT)
Age: 68
End: 2023-09-26

## 2023-09-26 NOTE — TELEPHONE ENCOUNTER
This RN called and spoke with patient to confirm that he has scheduled an appointment with Dr. Alexander Lopez. Patient states that his appointment is scheduled for 10/19/23.

## 2023-10-04 ENCOUNTER — HOSPITAL ENCOUNTER (OUTPATIENT)
Facility: HOSPITAL | Age: 68
Discharge: HOME | End: 2023-10-07
Payer: MEDICARE

## 2023-10-04 LAB
ALBUMIN SERPL-MCNC: 3.8 G/DL (ref 3.4–5)
ALBUMIN/GLOB SERPL: 1.3 (ref 0.8–1.7)
ALP SERPL-CCNC: 77 U/L (ref 45–117)
ALT SERPL-CCNC: 53 U/L (ref 16–61)
ANION GAP SERPL CALC-SCNC: 6 MMOL/L (ref 3–18)
AST SERPL-CCNC: 28 U/L (ref 10–38)
BILIRUB SERPL-MCNC: 0.8 MG/DL (ref 0.2–1)
BUN SERPL-MCNC: 20 MG/DL (ref 7–18)
BUN/CREAT SERPL: 19 (ref 12–20)
CALCIUM SERPL-MCNC: 8.8 MG/DL (ref 8.5–10.1)
CHLORIDE SERPL-SCNC: 110 MMOL/L (ref 100–111)
CO2 SERPL-SCNC: 25 MMOL/L (ref 21–32)
CREAT SERPL-MCNC: 1.03 MG/DL (ref 0.6–1.3)
ERYTHROCYTE [DISTWIDTH] IN BLOOD BY AUTOMATED COUNT: 12.4 % (ref 11.6–14.5)
FOLATE SERPL-MCNC: 18.9 NG/ML (ref 3.1–17.5)
GLOBULIN SER CALC-MCNC: 3 G/DL (ref 2–4)
GLUCOSE SERPL-MCNC: 92 MG/DL (ref 74–99)
HCT VFR BLD AUTO: 44.6 % (ref 36–48)
HGB BLD-MCNC: 15.1 G/DL (ref 13–16)
MCH RBC QN AUTO: 32.2 PG (ref 24–34)
MCHC RBC AUTO-ENTMCNC: 33.9 G/DL (ref 31–37)
MCV RBC AUTO: 95.1 FL (ref 78–100)
NRBC # BLD: 0 K/UL (ref 0–0.01)
NRBC BLD-RTO: 0 PER 100 WBC
PLATELET # BLD AUTO: 244 K/UL (ref 135–420)
PMV BLD AUTO: 9.5 FL (ref 9.2–11.8)
POTASSIUM SERPL-SCNC: 4.6 MMOL/L (ref 3.5–5.5)
PROT SERPL-MCNC: 6.8 G/DL (ref 6.4–8.2)
RBC # BLD AUTO: 4.69 M/UL (ref 4.35–5.65)
SODIUM SERPL-SCNC: 141 MMOL/L (ref 136–145)
TSH SERPL DL<=0.05 MIU/L-ACNC: 1.03 UIU/ML (ref 0.36–3.74)
VIT B12 SERPL-MCNC: 1768 PG/ML (ref 211–911)
WBC # BLD AUTO: 7.2 K/UL (ref 4.6–13.2)

## 2023-10-04 PROCEDURE — 84165 PROTEIN E-PHORESIS SERUM: CPT

## 2023-10-04 PROCEDURE — 36415 COLL VENOUS BLD VENIPUNCTURE: CPT

## 2023-10-04 PROCEDURE — 80053 COMPREHEN METABOLIC PANEL: CPT

## 2023-10-04 PROCEDURE — 82784 ASSAY IGA/IGD/IGG/IGM EACH: CPT

## 2023-10-04 PROCEDURE — 82607 VITAMIN B-12: CPT

## 2023-10-04 PROCEDURE — 86334 IMMUNOFIX E-PHORESIS SERUM: CPT

## 2023-10-04 PROCEDURE — 82746 ASSAY OF FOLIC ACID SERUM: CPT

## 2023-10-04 PROCEDURE — 85027 COMPLETE CBC AUTOMATED: CPT

## 2023-10-04 PROCEDURE — 84443 ASSAY THYROID STIM HORMONE: CPT

## 2023-10-09 ENCOUNTER — OFFICE VISIT (OUTPATIENT)
Age: 68
End: 2023-10-09
Payer: MEDICARE

## 2023-10-09 VITALS — HEIGHT: 70 IN | TEMPERATURE: 97.5 F | WEIGHT: 199 LBS | BODY MASS INDEX: 28.49 KG/M2

## 2023-10-09 DIAGNOSIS — M70.62 TROCHANTERIC BURSITIS OF LEFT HIP: Primary | ICD-10-CM

## 2023-10-09 LAB
ALBUMIN SERPL ELPH-MCNC: 3.9 G/DL (ref 2.9–4.4)
ALBUMIN/GLOB SERPL: 1.6 (ref 0.7–1.7)
ALPHA1 GLOB SERPL ELPH-MCNC: 0.2 G/DL (ref 0–0.4)
ALPHA2 GLOB SERPL ELPH-MCNC: 0.5 G/DL (ref 0.4–1)
B-GLOBULIN SERPL ELPH-MCNC: 0.8 G/DL (ref 0.7–1.3)
GAMMA GLOB SERPL ELPH-MCNC: 1.1 G/DL (ref 0.4–1.8)
GLOBULIN SER-MCNC: 2.6 G/DL (ref 2.2–3.9)
IGA SERPL-MCNC: 217 MG/DL (ref 61–437)
IGG SERPL-MCNC: 1073 MG/DL (ref 603–1613)
IGM SERPL-MCNC: 79 MG/DL (ref 20–172)
INTERPRETATION SERPL IEP-IMP: NORMAL
M PROTEIN SERPL ELPH-MCNC: NORMAL G/DL
PROT SERPL-MCNC: 6.5 G/DL (ref 6–8.5)

## 2023-10-09 PROCEDURE — G8484 FLU IMMUNIZE NO ADMIN: HCPCS | Performed by: ORTHOPAEDIC SURGERY

## 2023-10-09 PROCEDURE — 1036F TOBACCO NON-USER: CPT | Performed by: ORTHOPAEDIC SURGERY

## 2023-10-09 PROCEDURE — G8427 DOCREV CUR MEDS BY ELIG CLIN: HCPCS | Performed by: ORTHOPAEDIC SURGERY

## 2023-10-09 PROCEDURE — 3017F COLORECTAL CA SCREEN DOC REV: CPT | Performed by: ORTHOPAEDIC SURGERY

## 2023-10-09 PROCEDURE — 99214 OFFICE O/P EST MOD 30 MIN: CPT | Performed by: ORTHOPAEDIC SURGERY

## 2023-10-09 PROCEDURE — G8419 CALC BMI OUT NRM PARAM NOF/U: HCPCS | Performed by: ORTHOPAEDIC SURGERY

## 2023-10-09 PROCEDURE — 1123F ACP DISCUSS/DSCN MKR DOCD: CPT | Performed by: ORTHOPAEDIC SURGERY

## 2023-10-09 PROCEDURE — 20610 DRAIN/INJ JOINT/BURSA W/O US: CPT | Performed by: ORTHOPAEDIC SURGERY

## 2023-10-09 RX ORDER — BETAMETHASONE SODIUM PHOSPHATE AND BETAMETHASONE ACETATE 3; 3 MG/ML; MG/ML
3 INJECTION, SUSPENSION INTRA-ARTICULAR; INTRALESIONAL; INTRAMUSCULAR; SOFT TISSUE ONCE
Status: COMPLETED | OUTPATIENT
Start: 2023-10-09 | End: 2023-10-09

## 2023-10-09 RX ADMIN — BETAMETHASONE SODIUM PHOSPHATE AND BETAMETHASONE ACETATE 3 MG: 3; 3 INJECTION, SUSPENSION INTRA-ARTICULAR; INTRALESIONAL; INTRAMUSCULAR; SOFT TISSUE at 09:11

## 2023-10-09 NOTE — PROGRESS NOTES
11/00    H/O echocardiogram     mild cLVH, ef 65%, no wma abn, nl valves (2/19)    Hip bursitis 2015    Dr Shaylee Zapata    History of cardiac cath 2000    nl, EF 60% Dr. Milo Barboza    Hyperlipidemia     calculated 10 yr risk score 15.2% (2/19)    Hypertension     IFG (impaired fasting glucose) 2011    Osteoarthritis, knee 2015    Dr Shaylee Zapata    Renal cysts, acquired, bilateral 02/2018    Us showed 1.4cm cyst right, 7 cm and 2.5 cm cysts on left    Rhinophyma        Family History   Problem Relation Age of Onset    Heart Disease Brother     Other Father         cardiomyopathy       Current Outpatient Medications   Medication Sig Dispense Refill    ondansetron (ZOFRAN) 4 MG tablet Take 1 tablet by mouth 3 times daily as needed for Nausea or Vomiting 15 tablet 0    omeprazole (PRILOSEC) 40 MG delayed release capsule TAKE ONE CAPSULE BY MOUTH DAILY 90 capsule 2    rosuvastatin (CRESTOR) 10 MG tablet Take 1 tablet by mouth nightly 30 tablet 5    amLODIPine (NORVASC) 5 MG tablet TAKE ONE TABLET BY MOUTH DAILY 90 tablet 2    celecoxib (CELEBREX) 200 MG capsule TAKE ONE CAPSULE BY MOUTH DAILY 90 capsule 2    acetaminophen (TYLENOL) 500 MG tablet Take 2 tablets by mouth every 6 hours as needed      ascorbic acid (VITAMIN C) 500 MG tablet Take by mouth daily      vitamin D (CHOLECALCIFEROL) 25 MCG (1000 UT) TABS tablet Take 2.5 tablets by mouth daily      cyanocobalamin 1000 MCG tablet Take 1 tablet by mouth daily      glucosamine-chondroitin 750-600 MG TABS tablet Take 1,500 mg by mouth 2 times daily      potassium gluconate 550 mg tablet Take 99 mg by mouth daily       No current facility-administered medications for this visit.        Allergies   Allergen Reactions    Duloxetine Other (See Comments)     Felt funny       Past Surgical History:   Procedure Laterality Date    COLONOSCOPY N/A 7/13/2021    Dr. Alberto Ling (10/10) divertics; Dr Abril Guardado ([email or web address]) diverttics and TA/hyperplastic    LUMBAR LAMINECTOMY  12/2019    Dr Nancy Mathis

## 2023-10-15 ENCOUNTER — HOSPITAL ENCOUNTER (OUTPATIENT)
Facility: HOSPITAL | Age: 68
Discharge: HOME | End: 2023-10-18
Attending: ORTHOPAEDIC SURGERY
Payer: MEDICARE

## 2023-10-15 DIAGNOSIS — M70.62 TROCHANTERIC BURSITIS OF LEFT HIP: ICD-10-CM

## 2023-10-15 PROCEDURE — A9577 INJ MULTIHANCE: HCPCS | Performed by: ORTHOPAEDIC SURGERY

## 2023-10-15 PROCEDURE — 72158 MRI LUMBAR SPINE W/O & W/DYE: CPT

## 2023-10-15 PROCEDURE — 6360000004 HC RX CONTRAST MEDICATION: Performed by: ORTHOPAEDIC SURGERY

## 2023-10-15 RX ADMIN — GADOBENATE DIMEGLUMINE 19 ML: 529 INJECTION, SOLUTION INTRAVENOUS at 17:26

## 2023-10-17 ENCOUNTER — OFFICE VISIT (OUTPATIENT)
Age: 68
End: 2023-10-17
Payer: MEDICARE

## 2023-10-17 VITALS
HEART RATE: 60 BPM | HEIGHT: 70 IN | OXYGEN SATURATION: 96 % | TEMPERATURE: 97.9 F | WEIGHT: 200 LBS | BODY MASS INDEX: 28.63 KG/M2

## 2023-10-17 DIAGNOSIS — G62.9 NEUROPATHY: ICD-10-CM

## 2023-10-17 DIAGNOSIS — M25.562 LEFT KNEE PAIN, UNSPECIFIED CHRONICITY: Primary | ICD-10-CM

## 2023-10-17 DIAGNOSIS — M47.816 LUMBAR FACET ARTHROPATHY: ICD-10-CM

## 2023-10-17 DIAGNOSIS — M54.50 LUMBAR PAIN: ICD-10-CM

## 2023-10-17 DIAGNOSIS — R20.0 NUMBNESS OF BOTH LOWER EXTREMITIES: ICD-10-CM

## 2023-10-17 DIAGNOSIS — Z98.890 S/P LAMINECTOMY: ICD-10-CM

## 2023-10-17 PROCEDURE — G8427 DOCREV CUR MEDS BY ELIG CLIN: HCPCS | Performed by: PHYSICAL MEDICINE & REHABILITATION

## 2023-10-17 PROCEDURE — 3017F COLORECTAL CA SCREEN DOC REV: CPT | Performed by: PHYSICAL MEDICINE & REHABILITATION

## 2023-10-17 PROCEDURE — 1123F ACP DISCUSS/DSCN MKR DOCD: CPT | Performed by: PHYSICAL MEDICINE & REHABILITATION

## 2023-10-17 PROCEDURE — G8484 FLU IMMUNIZE NO ADMIN: HCPCS | Performed by: PHYSICAL MEDICINE & REHABILITATION

## 2023-10-17 PROCEDURE — 1036F TOBACCO NON-USER: CPT | Performed by: PHYSICAL MEDICINE & REHABILITATION

## 2023-10-17 PROCEDURE — 99204 OFFICE O/P NEW MOD 45 MIN: CPT | Performed by: PHYSICAL MEDICINE & REHABILITATION

## 2023-10-17 PROCEDURE — G8419 CALC BMI OUT NRM PARAM NOF/U: HCPCS | Performed by: PHYSICAL MEDICINE & REHABILITATION

## 2023-10-17 ASSESSMENT — ENCOUNTER SYMPTOMS
DIARRHEA: 0
SHORTNESS OF BREATH: 0
NAUSEA: 0
VOMITING: 0
BACK PAIN: 1
TROUBLE SWALLOWING: 0

## 2023-10-17 NOTE — PROGRESS NOTES
AP diameter. Moderate to severe   left-sided foraminal stenosis due to facet hypertrophy predominantly but also   disc bulge laterally which appears to contact the caudal surface of the exiting   nerve root. Similar findings on the right. L5-S1:  Moderate bilateral degenerative facet hypertrophy with mild ligamentous   thickening. There is a broad-based posterior disc bulge or protrusion which   extends 4 mm into the canal. Central canal is mildly narrowed down to 10 mm   subsequent. There is moderate to severe left and right foraminal stenosis due to   facet encroachment and endplate ridging and disc bulge. IMPRESSION   IMPRESSION:        1. Fairly severe central canal narrowing and bilateral foraminal stenosis L4-5 on a multifactorial degenerative basis as above. -There is some mild edema within the posterior adjoining endplates at this   level. 2. Broad-based posterior disc bulge or protrusion, with mild canal but fairly   severe bilateral neural foraminal narrowing L5-S1 as above. 24 minutes of face-to-face contact were spent with the patient during today's visit extensively discussing symptoms and treatment plan. All questions were answered. More than half of this visit today was spent on counseling. Written by Colby Sweet, as dictated by Dr. Myah Multani.

## 2023-10-25 ENCOUNTER — OFFICE VISIT (OUTPATIENT)
Age: 68
End: 2023-10-25

## 2023-10-25 VITALS — BODY MASS INDEX: 28.7 KG/M2 | HEIGHT: 70 IN

## 2023-10-25 DIAGNOSIS — I73.9 PAD (PERIPHERAL ARTERY DISEASE) (HCC): ICD-10-CM

## 2023-10-25 DIAGNOSIS — M25.362 KNEE GIVES WAY, LEFT: ICD-10-CM

## 2023-10-25 DIAGNOSIS — M17.12 PATELLOFEMORAL ARTHRITIS OF LEFT KNEE: ICD-10-CM

## 2023-10-25 DIAGNOSIS — M25.562 ACUTE PAIN OF LEFT KNEE: Primary | ICD-10-CM

## 2023-10-25 DIAGNOSIS — Q68.2 PATELLA ALTA: ICD-10-CM

## 2023-10-25 RX ORDER — BETAMETHASONE SODIUM PHOSPHATE AND BETAMETHASONE ACETATE 3; 3 MG/ML; MG/ML
6 INJECTION, SUSPENSION INTRA-ARTICULAR; INTRALESIONAL; INTRAMUSCULAR; SOFT TISSUE ONCE
Status: COMPLETED | OUTPATIENT
Start: 2023-10-25 | End: 2023-10-25

## 2023-10-25 RX ADMIN — BETAMETHASONE SODIUM PHOSPHATE AND BETAMETHASONE ACETATE 6 MG: 3; 3 INJECTION, SUSPENSION INTRA-ARTICULAR; INTRALESIONAL; INTRAMUSCULAR; SOFT TISSUE at 08:49

## 2023-10-25 NOTE — PROGRESS NOTES
diverttics and TA/hyperplastic    LUMBAR LAMINECTOMY  2019    Dr Kandi Cruz L4-5 hemilaminectomy and medial facectomy    ORTHOPEDIC SURGERY  2012    Dr. Franco Has left distal clavicle excision and acromioplasty    UMBILICAL HERNIA REPAIR  2019    Dr Sonja Enriquez History     Socioeconomic History    Marital status:      Spouse name: Not on file    Number of children: 3    Years of education: Not on file    Highest education level: Not on file   Occupational History    Occupation: ret mgr [employer]   Tobacco Use    Smoking status: Former     Packs/day: 2.00     Years: 30.00     Additional pack years: 0.00     Total pack years: 60.00     Types: Cigarettes     Quit date: 2004     Years since quittin.1    Smokeless tobacco: Never   Vaping Use    Vaping Use: Never used   Substance and Sexual Activity    Alcohol use: No    Drug use: No    Sexual activity: Yes     Partners: Female   Other Topics Concern    Not on file   Social History Narrative    Not on file     Social Determinants of Health     Financial Resource Strain: Low Risk  (2023)    Overall Financial Resource Strain (CARDIA)     Difficulty of Paying Living Expenses: Not hard at all   Food Insecurity: No Food Insecurity (2023)    Hunger Vital Sign     Worried About Running Out of Food in the Last Year: Never true     [address] in the Last Year: Never true   Transportation Needs: Unknown (2023)    PRAPARE - Transportation     Lack of Transportation (Medical): Not on file     Lack of Transportation (Non-Medical):  No   Physical Activity: Sufficiently Active (2023)    Exercise Vital Sign     Days of Exercise per Week: 3 days     Minutes of Exercise per Session: 60 min   Stress: Not on file   Social Connections: Not on file   Intimate Partner Violence: Not on file   Housing Stability: Unknown (2023)    Housing Stability Vital Sign     Unable to Pay for Housing in the Last Year: Not on file     [address]

## 2023-10-31 DIAGNOSIS — E78.5 HYPERLIPIDEMIA, UNSPECIFIED HYPERLIPIDEMIA TYPE: ICD-10-CM

## 2023-11-03 RX ORDER — ROSUVASTATIN CALCIUM 10 MG/1
10 TABLET, COATED ORAL
Qty: 90 TABLET | Refills: 3 | Status: SHIPPED | OUTPATIENT
Start: 2023-11-03

## 2023-11-29 ENCOUNTER — OFFICE VISIT (OUTPATIENT)
Age: 68
End: 2023-11-29
Payer: MEDICARE

## 2023-11-29 VITALS — WEIGHT: 199.8 LBS | BODY MASS INDEX: 28.6 KG/M2 | HEIGHT: 70 IN

## 2023-11-29 DIAGNOSIS — M17.12 PATELLOFEMORAL ARTHRITIS OF LEFT KNEE: ICD-10-CM

## 2023-11-29 DIAGNOSIS — M25.562 CHRONIC PAIN OF LEFT KNEE: ICD-10-CM

## 2023-11-29 DIAGNOSIS — M70.62 TROCHANTERIC BURSITIS OF LEFT HIP: ICD-10-CM

## 2023-11-29 DIAGNOSIS — M76.892 ENTHESOPATHY OF LEFT HIP REGION: ICD-10-CM

## 2023-11-29 DIAGNOSIS — G89.29 CHRONIC PAIN OF LEFT KNEE: ICD-10-CM

## 2023-11-29 DIAGNOSIS — M17.12 PRIMARY OSTEOARTHRITIS OF LEFT KNEE: Primary | ICD-10-CM

## 2023-11-29 DIAGNOSIS — Q68.2 PATELLA ALTA: ICD-10-CM

## 2023-11-29 DIAGNOSIS — G89.29 CHRONIC LEFT HIP PAIN: ICD-10-CM

## 2023-11-29 DIAGNOSIS — M85.60 CYST OF BONE: ICD-10-CM

## 2023-11-29 DIAGNOSIS — M25.552 CHRONIC LEFT HIP PAIN: ICD-10-CM

## 2023-11-29 DIAGNOSIS — M16.12 PRIMARY OSTEOARTHRITIS OF LEFT HIP: ICD-10-CM

## 2023-11-29 PROCEDURE — 1123F ACP DISCUSS/DSCN MKR DOCD: CPT | Performed by: ORTHOPAEDIC SURGERY

## 2023-11-29 PROCEDURE — G8419 CALC BMI OUT NRM PARAM NOF/U: HCPCS | Performed by: ORTHOPAEDIC SURGERY

## 2023-11-29 PROCEDURE — 1036F TOBACCO NON-USER: CPT | Performed by: ORTHOPAEDIC SURGERY

## 2023-11-29 PROCEDURE — G8427 DOCREV CUR MEDS BY ELIG CLIN: HCPCS | Performed by: ORTHOPAEDIC SURGERY

## 2023-11-29 PROCEDURE — 3017F COLORECTAL CA SCREEN DOC REV: CPT | Performed by: ORTHOPAEDIC SURGERY

## 2023-11-29 PROCEDURE — G8484 FLU IMMUNIZE NO ADMIN: HCPCS | Performed by: ORTHOPAEDIC SURGERY

## 2023-11-29 PROCEDURE — 99214 OFFICE O/P EST MOD 30 MIN: CPT | Performed by: ORTHOPAEDIC SURGERY

## 2023-12-15 ENCOUNTER — HOSPITAL ENCOUNTER (OUTPATIENT)
Facility: HOSPITAL | Age: 68
End: 2023-12-15
Attending: ORTHOPAEDIC SURGERY
Payer: MEDICARE

## 2023-12-15 DIAGNOSIS — G89.29 CHRONIC LEFT HIP PAIN: ICD-10-CM

## 2023-12-15 DIAGNOSIS — M16.12 PRIMARY OSTEOARTHRITIS OF LEFT HIP: ICD-10-CM

## 2023-12-15 DIAGNOSIS — M25.552 CHRONIC LEFT HIP PAIN: ICD-10-CM

## 2023-12-15 DIAGNOSIS — M70.62 TROCHANTERIC BURSITIS OF LEFT HIP: ICD-10-CM

## 2023-12-15 PROCEDURE — 73721 MRI JNT OF LWR EXTRE W/O DYE: CPT

## 2024-01-04 ENCOUNTER — OFFICE VISIT (OUTPATIENT)
Age: 69
End: 2024-01-04
Payer: MEDICARE

## 2024-01-04 VITALS
RESPIRATION RATE: 16 BRPM | WEIGHT: 201 LBS | SYSTOLIC BLOOD PRESSURE: 134 MMHG | BODY MASS INDEX: 28.77 KG/M2 | HEIGHT: 70 IN | OXYGEN SATURATION: 95 % | TEMPERATURE: 97.4 F | HEART RATE: 64 BPM | DIASTOLIC BLOOD PRESSURE: 86 MMHG

## 2024-01-04 DIAGNOSIS — R73.01 IFG (IMPAIRED FASTING GLUCOSE): ICD-10-CM

## 2024-01-04 DIAGNOSIS — I25.10 CORONARY ARTERY DISEASE INVOLVING NATIVE CORONARY ARTERY OF NATIVE HEART WITHOUT ANGINA PECTORIS: ICD-10-CM

## 2024-01-04 DIAGNOSIS — N20.0 NEPHROLITHIASIS: Primary | ICD-10-CM

## 2024-01-04 DIAGNOSIS — H25.9 AGE-RELATED CATARACT OF BOTH EYES, UNSPECIFIED AGE-RELATED CATARACT TYPE: ICD-10-CM

## 2024-01-04 DIAGNOSIS — Z01.818 PREOP EXAM FOR INTERNAL MEDICINE: ICD-10-CM

## 2024-01-04 PROCEDURE — 3075F SYST BP GE 130 - 139MM HG: CPT | Performed by: INTERNAL MEDICINE

## 2024-01-04 PROCEDURE — 1123F ACP DISCUSS/DSCN MKR DOCD: CPT | Performed by: INTERNAL MEDICINE

## 2024-01-04 PROCEDURE — G8427 DOCREV CUR MEDS BY ELIG CLIN: HCPCS | Performed by: INTERNAL MEDICINE

## 2024-01-04 PROCEDURE — 99214 OFFICE O/P EST MOD 30 MIN: CPT | Performed by: INTERNAL MEDICINE

## 2024-01-04 PROCEDURE — 3079F DIAST BP 80-89 MM HG: CPT | Performed by: INTERNAL MEDICINE

## 2024-01-04 PROCEDURE — G8419 CALC BMI OUT NRM PARAM NOF/U: HCPCS | Performed by: INTERNAL MEDICINE

## 2024-01-04 PROCEDURE — G8484 FLU IMMUNIZE NO ADMIN: HCPCS | Performed by: INTERNAL MEDICINE

## 2024-01-04 PROCEDURE — 1036F TOBACCO NON-USER: CPT | Performed by: INTERNAL MEDICINE

## 2024-01-04 PROCEDURE — 3017F COLORECTAL CA SCREEN DOC REV: CPT | Performed by: INTERNAL MEDICINE

## 2024-01-04 NOTE — PROGRESS NOTES
Cb Reich presents today for   Chief Complaint   Patient presents with    Pre-op Exam     Accompanied by wife; does not have any paperwork for signature.    1. \"Have you been to the ER, urgent care clinic since your last visit?  Hospitalized since your last visit?\" Yes; ER for kidney stones    2. \"Have you seen or consulted any other health care providers outside of the [facility] since your last visit?\" Yes     3. For patients aged 45-75: Has the patient had a colonoscopy / FIT/ Cologuard? Yes - no Care Gap present      If the patient is female:    4. For patients aged 40-74: Has the patient had a mammogram within the past 2 years? NA - based on age or sex      5. For patients aged 21-65: Has the patient had a pap smear? NA - based on age or sex    
Controlled on current regimen.    NEW ISSUES  6.  Ortho.  Per Dr Souza and Dr Harmon  7.  Cataracts.  He is felt to be average risk for the planned procedure, no contraindications noted.  Routine postop prophylaxis per protocol. If needed, cover with correction insulin per hospital protocol  8.  Nephrolithiasis.  Declined urology referral; aggressive hydration        RTC 3/24    Above conditions discussed at length and patient vocalized understanding.  All questions answered to patient satisfaction     Diagnosis Orders   1. Nephrolithiasis        2. Preop exam for internal medicine        3. Age-related cataract of both eyes, unspecified age-related cataract type        4. Coronary artery disease involving native coronary artery of native heart without angina pectoris        5. IFG (impaired fasting glucose)

## 2024-01-05 PROBLEM — N20.0 NEPHROLITHIASIS: Status: ACTIVE | Noted: 2024-01-05

## 2024-01-10 ENCOUNTER — OFFICE VISIT (OUTPATIENT)
Age: 69
End: 2024-01-10
Payer: MEDICARE

## 2024-01-10 VITALS — BODY MASS INDEX: 29.06 KG/M2 | WEIGHT: 203 LBS | HEIGHT: 70 IN

## 2024-01-10 DIAGNOSIS — M70.62 TROCHANTERIC BURSITIS OF LEFT HIP: Primary | ICD-10-CM

## 2024-01-10 PROCEDURE — 1036F TOBACCO NON-USER: CPT | Performed by: ORTHOPAEDIC SURGERY

## 2024-01-10 PROCEDURE — 1123F ACP DISCUSS/DSCN MKR DOCD: CPT | Performed by: ORTHOPAEDIC SURGERY

## 2024-01-10 PROCEDURE — G8484 FLU IMMUNIZE NO ADMIN: HCPCS | Performed by: ORTHOPAEDIC SURGERY

## 2024-01-10 PROCEDURE — G8419 CALC BMI OUT NRM PARAM NOF/U: HCPCS | Performed by: ORTHOPAEDIC SURGERY

## 2024-01-10 PROCEDURE — 3017F COLORECTAL CA SCREEN DOC REV: CPT | Performed by: ORTHOPAEDIC SURGERY

## 2024-01-10 PROCEDURE — 99214 OFFICE O/P EST MOD 30 MIN: CPT | Performed by: ORTHOPAEDIC SURGERY

## 2024-01-10 PROCEDURE — G8427 DOCREV CUR MEDS BY ELIG CLIN: HCPCS | Performed by: ORTHOPAEDIC SURGERY

## 2024-01-10 NOTE — PROGRESS NOTES
Smokeless tobacco: Never   Vaping Use    Vaping Use: Never used   Substance and Sexual Activity    Alcohol use: No    Drug use: No    Sexual activity: Yes     Partners: Female   Other Topics Concern    Not on file   Social History Narrative    Not on file     Social Determinants of Health     Financial Resource Strain: Low Risk  (5/17/2023)    Overall Financial Resource Strain (CARDIA)     Difficulty of Paying Living Expenses: Not hard at all   Food Insecurity: Not on file (5/17/2023)   Transportation Needs: Unknown (5/17/2023)    PRAPARE - Transportation     Lack of Transportation (Medical): Not on file     Lack of Transportation (Non-Medical): No   Physical Activity: Sufficiently Active (5/17/2023)    Exercise Vital Sign     Days of Exercise per Week: 3 days     Minutes of Exercise per Session: 60 min   Stress: Not on file   Social Connections: Not on file   Intimate Partner Violence: Not on file   Housing Stability: Unknown (5/17/2023)    Housing Stability Vital Sign     Unable to Pay for Housing in the Last Year: Not on file     Number of Places Lived in the Last Year: Not on file     Unstable Housing in the Last Year: No       Ht 1.778 m (5' 10\")   Wt 92.1 kg (203 lb)   BMI 29.13 kg/m²       PHYSICAL EXAMINATION:  GENERAL: Alert and oriented x3, in no acute distress, well-developed, well-nourished, afebrile.  HEART: No JVD.  EYES: No scleral icterus   NECK: No significant lymphadenopathy   LUNGS: No respiratory compromise or indrawing  ABDOMEN: Soft, non-tender, non-distended.         Note: This note was completed using voice recognition software. Any typographical/name errors or mistakes are unintentional.    Electronically signed by: MARQUEZ MURILLO MD

## 2024-01-22 RX ORDER — CELECOXIB 200 MG/1
CAPSULE ORAL
Qty: 90 CAPSULE | Refills: 3 | Status: SHIPPED | OUTPATIENT
Start: 2024-01-22

## 2024-01-22 RX ORDER — AMLODIPINE BESYLATE 5 MG/1
TABLET ORAL
Qty: 90 TABLET | Refills: 3 | Status: SHIPPED | OUTPATIENT
Start: 2024-01-22

## 2024-01-24 PROBLEM — G62.9 NEUROPATHY: Status: ACTIVE | Noted: 2024-01-24

## 2024-04-18 RX ORDER — OMEPRAZOLE 40 MG/1
40 CAPSULE, DELAYED RELEASE ORAL DAILY
Qty: 90 CAPSULE | Refills: 2 | Status: SHIPPED | OUTPATIENT
Start: 2024-04-18

## 2024-04-26 NOTE — ACP (ADVANCE CARE PLANNING)
Advance Care Planning     Advance Care Planning (ACP) Physician/NP/PA Conversation    Date of Conversation: 5/17/2023  Conducted with: Patient with Decision Making Capacity    Healthcare Decision Maker:      Primary Decision Maker: Liset Reich - Spouse - [phone]    Click here to complete Healthcare Decision Makers including selection of the Healthcare Decision Maker Relationship (ie \"Primary\")  Today we documented Decision Maker(s) consistent with Legal Next of Kin hierarchy. Care Preferences:    Hospitalization: \"If your health worsens and it becomes clear that your chance of recovery is unlikely, what would be your preference regarding hospitalization? \"  The patient would prefer hospitalization. Ventilation: \"If you were unable to breath on your own and your chance of recovery was unlikely, what would be your preference about the use of a ventilator (breathing machine) if it was available to you? \"  The patient would desire the use of a ventilator. Resuscitation: \"In the event your heart stopped as a result of an underlying serious health condition, would you want attempts made to restart your heart, or would you prefer a natural death? \"  Yes, attempt to resuscitate.     ventilation preferences, hospitalization preferences, and resuscitation preferences    Conversation Outcomes / Follow-Up Plan:  ACP in process - information provided, considering goals and options  Reviewed DNR/DNI and patient elects Full Code (Attempt Resuscitation)    Length of Voluntary ACP Conversation in minutes:  16 minutes    Makeda Younger MD normal S1, S2 heard

## 2024-05-14 ENCOUNTER — HOSPITAL ENCOUNTER (OUTPATIENT)
Facility: HOSPITAL | Age: 69
Discharge: HOME | End: 2024-05-17
Payer: MEDICARE

## 2024-05-14 DIAGNOSIS — Z00.00 ENCOUNTER FOR ANNUAL PHYSICAL EXAM: ICD-10-CM

## 2024-05-14 DIAGNOSIS — E78.5 HYPERLIPIDEMIA, UNSPECIFIED HYPERLIPIDEMIA TYPE: ICD-10-CM

## 2024-05-14 DIAGNOSIS — R73.03 PREDIABETES: ICD-10-CM

## 2024-05-14 LAB
ALBUMIN SERPL-MCNC: 3.8 G/DL (ref 3.4–5)
ALBUMIN/GLOB SERPL: 1.3 (ref 0.8–1.7)
ALP SERPL-CCNC: 77 U/L (ref 45–117)
ALT SERPL-CCNC: 49 U/L (ref 16–61)
ANION GAP SERPL CALC-SCNC: 4 MMOL/L (ref 3–18)
AST SERPL-CCNC: 29 U/L (ref 10–38)
BILIRUB SERPL-MCNC: 0.6 MG/DL (ref 0.2–1)
BUN SERPL-MCNC: 20 MG/DL (ref 7–18)
BUN/CREAT SERPL: 22 (ref 12–20)
CALCIUM SERPL-MCNC: 9 MG/DL (ref 8.5–10.1)
CHLORIDE SERPL-SCNC: 110 MMOL/L (ref 100–111)
CHOLEST SERPL-MCNC: 119 MG/DL
CO2 SERPL-SCNC: 27 MMOL/L (ref 21–32)
CREAT SERPL-MCNC: 0.91 MG/DL (ref 0.6–1.3)
ERYTHROCYTE [DISTWIDTH] IN BLOOD BY AUTOMATED COUNT: 12.4 % (ref 11.6–14.5)
EST. AVERAGE GLUCOSE BLD GHB EST-MCNC: 120 MG/DL
GLOBULIN SER CALC-MCNC: 2.9 G/DL (ref 2–4)
GLUCOSE SERPL-MCNC: 108 MG/DL (ref 74–99)
HBA1C MFR BLD: 5.8 % (ref 4.2–5.6)
HCT VFR BLD AUTO: 46.3 % (ref 36–48)
HDLC SERPL-MCNC: 47 MG/DL (ref 40–60)
HDLC SERPL: 2.5 (ref 0–5)
HGB BLD-MCNC: 15.5 G/DL (ref 13–16)
LDLC SERPL CALC-MCNC: 59.6 MG/DL (ref 0–100)
LIPID PANEL: NORMAL
MCH RBC QN AUTO: 32.6 PG (ref 24–34)
MCHC RBC AUTO-ENTMCNC: 33.5 G/DL (ref 31–37)
MCV RBC AUTO: 97.5 FL (ref 78–100)
NRBC # BLD: 0 K/UL (ref 0–0.01)
NRBC BLD-RTO: 0 PER 100 WBC
PLATELET # BLD AUTO: 227 K/UL (ref 135–420)
PMV BLD AUTO: 9.7 FL (ref 9.2–11.8)
POTASSIUM SERPL-SCNC: 4.5 MMOL/L (ref 3.5–5.5)
PROT SERPL-MCNC: 6.7 G/DL (ref 6.4–8.2)
PSA SERPL-MCNC: 1.4 NG/ML (ref 0–4)
RBC # BLD AUTO: 4.75 M/UL (ref 4.35–5.65)
SODIUM SERPL-SCNC: 141 MMOL/L (ref 136–145)
TRIGL SERPL-MCNC: 62 MG/DL
VLDLC SERPL CALC-MCNC: 12.4 MG/DL
WBC # BLD AUTO: 6.1 K/UL (ref 4.6–13.2)

## 2024-05-14 PROCEDURE — 80053 COMPREHEN METABOLIC PANEL: CPT

## 2024-05-14 PROCEDURE — 85027 COMPLETE CBC AUTOMATED: CPT

## 2024-05-14 PROCEDURE — 80061 LIPID PANEL: CPT

## 2024-05-14 PROCEDURE — G0103 PSA SCREENING: HCPCS

## 2024-05-14 PROCEDURE — 36415 COLL VENOUS BLD VENIPUNCTURE: CPT

## 2024-05-14 PROCEDURE — 83036 HEMOGLOBIN GLYCOSYLATED A1C: CPT

## 2024-05-20 ENCOUNTER — OFFICE VISIT (OUTPATIENT)
Age: 69
End: 2024-05-20
Payer: MEDICARE

## 2024-05-20 VITALS
BODY MASS INDEX: 28.35 KG/M2 | WEIGHT: 198 LBS | HEIGHT: 70 IN | HEART RATE: 59 BPM | TEMPERATURE: 98.6 F | RESPIRATION RATE: 14 BRPM | SYSTOLIC BLOOD PRESSURE: 138 MMHG | OXYGEN SATURATION: 95 % | DIASTOLIC BLOOD PRESSURE: 80 MMHG

## 2024-05-20 DIAGNOSIS — D12.6 COLON ADENOMA: ICD-10-CM

## 2024-05-20 DIAGNOSIS — Z00.00 MEDICARE ANNUAL WELLNESS VISIT, SUBSEQUENT: Primary | ICD-10-CM

## 2024-05-20 DIAGNOSIS — Z12.5 SCREENING FOR MALIGNANT NEOPLASM OF PROSTATE: ICD-10-CM

## 2024-05-20 DIAGNOSIS — I25.10 CORONARY ARTERY DISEASE INVOLVING NATIVE CORONARY ARTERY OF NATIVE HEART WITHOUT ANGINA PECTORIS: ICD-10-CM

## 2024-05-20 DIAGNOSIS — Z71.89 ADVANCED CARE PLANNING/COUNSELING DISCUSSION: ICD-10-CM

## 2024-05-20 DIAGNOSIS — I10 PRIMARY HYPERTENSION: ICD-10-CM

## 2024-05-20 DIAGNOSIS — R73.01 IFG (IMPAIRED FASTING GLUCOSE): ICD-10-CM

## 2024-05-20 DIAGNOSIS — N20.0 NEPHROLITHIASIS: ICD-10-CM

## 2024-05-20 DIAGNOSIS — E78.5 HYPERLIPIDEMIA, UNSPECIFIED HYPERLIPIDEMIA TYPE: ICD-10-CM

## 2024-05-20 PROCEDURE — 3075F SYST BP GE 130 - 139MM HG: CPT | Performed by: INTERNAL MEDICINE

## 2024-05-20 PROCEDURE — 1123F ACP DISCUSS/DSCN MKR DOCD: CPT | Performed by: INTERNAL MEDICINE

## 2024-05-20 PROCEDURE — G0439 PPPS, SUBSEQ VISIT: HCPCS | Performed by: INTERNAL MEDICINE

## 2024-05-20 PROCEDURE — 3017F COLORECTAL CA SCREEN DOC REV: CPT | Performed by: INTERNAL MEDICINE

## 2024-05-20 PROCEDURE — 3079F DIAST BP 80-89 MM HG: CPT | Performed by: INTERNAL MEDICINE

## 2024-05-20 PROCEDURE — 99497 ADVNCD CARE PLAN 30 MIN: CPT | Performed by: INTERNAL MEDICINE

## 2024-05-20 PROCEDURE — 99214 OFFICE O/P EST MOD 30 MIN: CPT | Performed by: INTERNAL MEDICINE

## 2024-05-20 PROCEDURE — G8419 CALC BMI OUT NRM PARAM NOF/U: HCPCS | Performed by: INTERNAL MEDICINE

## 2024-05-20 PROCEDURE — G8427 DOCREV CUR MEDS BY ELIG CLIN: HCPCS | Performed by: INTERNAL MEDICINE

## 2024-05-20 PROCEDURE — 1036F TOBACCO NON-USER: CPT | Performed by: INTERNAL MEDICINE

## 2024-05-20 RX ORDER — CETIRIZINE HYDROCHLORIDE 10 MG/1
10 TABLET ORAL DAILY
COMMUNITY

## 2024-05-20 SDOH — ECONOMIC STABILITY: FOOD INSECURITY: WITHIN THE PAST 12 MONTHS, THE FOOD YOU BOUGHT JUST DIDN'T LAST AND YOU DIDN'T HAVE MONEY TO GET MORE.: NEVER TRUE

## 2024-05-20 SDOH — ECONOMIC STABILITY: FOOD INSECURITY: WITHIN THE PAST 12 MONTHS, YOU WORRIED THAT YOUR FOOD WOULD RUN OUT BEFORE YOU GOT MONEY TO BUY MORE.: NEVER TRUE

## 2024-05-20 SDOH — ECONOMIC STABILITY: INCOME INSECURITY: HOW HARD IS IT FOR YOU TO PAY FOR THE VERY BASICS LIKE FOOD, HOUSING, MEDICAL CARE, AND HEATING?: NOT HARD AT ALL

## 2024-05-20 ASSESSMENT — PATIENT HEALTH QUESTIONNAIRE - PHQ9
SUM OF ALL RESPONSES TO PHQ QUESTIONS 1-9: 0
SUM OF ALL RESPONSES TO PHQ QUESTIONS 1-9: 0
SUM OF ALL RESPONSES TO PHQ9 QUESTIONS 1 & 2: 0
SUM OF ALL RESPONSES TO PHQ QUESTIONS 1-9: 0
SUM OF ALL RESPONSES TO PHQ QUESTIONS 1-9: 0
1. LITTLE INTEREST OR PLEASURE IN DOING THINGS: NOT AT ALL
2. FEELING DOWN, DEPRESSED OR HOPELESS: NOT AT ALL

## 2024-05-20 NOTE — ACP (ADVANCE CARE PLANNING)
Advance Care Planning     Advance Care Planning (ACP) Physician/NP/PA Conversation    Date of Conversation: 5/20/2024  Conducted with: Patient with Decision Making Capacity    Healthcare Decision Maker:      Primary Decision Maker: Liset Reich - Spouse - [phone]    Click here to complete Healthcare Decision Makers including selection of the Healthcare Decision Maker Relationship (ie \"Primary\")  Today we documented Decision Maker(s) consistent with Legal Next of Kin hierarchy.    Care Preferences:    Hospitalization:  \"If your health worsens and it becomes clear that your chance of recovery is unlikely, what would be your preference regarding hospitalization?\"  The patient would prefer hospitalization.    Ventilation:  \"If you were unable to breath on your own and your chance of recovery was unlikely, what would be your preference about the use of a ventilator (breathing machine) if it was available to you?\"  The patient would desire the use of a ventilator.    Resuscitation:  \"In the event your heart stopped as a result of an underlying serious health condition, would you want attempts made to restart your heart, or would you prefer a natural death?\"  Yes, attempt to resuscitate.    ventilation preferences, hospitalization preferences, and resuscitation preferences    Conversation Outcomes / Follow-Up Plan:  ACP in process - information provided, considering goals and options  Reviewed DNR/DNI and patient elects Full Code (Attempt Resuscitation)    Length of Voluntary ACP Conversation in minutes:  16 minutes    JERONIMO RAO MD

## 2024-05-20 NOTE — PATIENT INSTRUCTIONS
things you can do to protect your heart. It is never too late to quit. Try to avoid secondhand smoke too.     Stay at a weight that's healthy for you. Talk to your doctor if you need help losing weight.     Try to get 7 to 9 hours of sleep each night.     Limit alcohol to 2 drinks a day for men and 1 drink a day for women. Too much alcohol can cause health problems.     Manage other health problems such as diabetes, high blood pressure, and high cholesterol. If you think you may have a problem with alcohol or drug use, talk to your doctor.   Medicines    Take your medicines exactly as prescribed. Call your doctor if you think you are having a problem with your medicine.     If your doctor recommends aspirin, take the amount directed each day. Make sure you take aspirin and not another kind of pain reliever, such as acetaminophen (Tylenol).   When should you call for help?   Call 911 if you have symptoms of a heart attack. These may include:    Chest pain or pressure, or a strange feeling in the chest.     Sweating.     Shortness of breath.     Pain, pressure, or a strange feeling in the back, neck, jaw, or upper belly or in one or both shoulders or arms.     Lightheadedness or sudden weakness.     A fast or irregular heartbeat.   After you call 911, the  may tell you to chew 1 adult-strength or 2 to 4 low-dose aspirin. Wait for an ambulance. Do not try to drive yourself.  Watch closely for changes in your health, and be sure to contact your doctor if you have any problems.  Where can you learn more?  Go to https://www.[employer].net/patientEd and enter F075 to learn more about \"A Healthy Heart: Care Instructions.\"  Current as of: June 24, 2023               Content Version: 14.0  © 9438-2313 [employer].   Care instructions adapted under license by [employer]. If you have questions about a medical condition or this instruction, always ask your healthcare professional. [employer]

## 2024-05-20 NOTE — PROGRESS NOTES
68 y.o. male who presents for evaluation.  Wife was present for the encounter as per usual    Happy with the results of the cataract surgeries    Denies any cp, sob, pnd, edema.  Walks daily and takes care of chores, lawn, etc.  Continuing to see Dr Harmon and Dr Souza    He saw Dr Phan and emg did confirm neuropathy.  He has gait unsteadiness but declined any PT, remains very active    Denies polyuria, polydipsia, nocturia, vision change.  Not checking sugars at this time.     No gi complaints.  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 Hamilton    Fatty liver 11/00    Fib-4 was 0.54 from (10/14); US (2/18) showed fatty liver and hepatomegaly; CT (9/23)    GERD (gastroesophageal reflux disease)     and HH on UGI 11/00    H/O echocardiogram     mild cLVH, ef 65%, no wma abn, nl valves (2/19)    Hip bursitis 2015    Dr Harmon LEFT    History of cardiac cath 2000    nl, EF 60% Dr. 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 
Cb Reich presents today for   Chief Complaint   Patient presents with    Medicare AWV     Accompanied by spouse.    \"Have you been to the ER, urgent care clinic since your last visit?  Hospitalized since your last visit?\"    NO    “Have you seen or consulted any other health care providers outside of [facility] since your last visit?”    NO           
updated this visit:  Tobacco  Allergies  Meds  Problems  Med Hx  Surg Hx  Soc Hx  Fam Hx

## 2024-08-30 ENCOUNTER — TELEPHONE (OUTPATIENT)
Facility: CLINIC | Age: 69
End: 2024-08-30

## 2024-08-30 DIAGNOSIS — U07.1 COVID-19 VIRUS INFECTION: Primary | ICD-10-CM

## 2024-08-30 NOTE — TELEPHONE ENCOUNTER
9/27/2019      RE: Jacques Sharp  [id number] [facility] [place] [address]       Dear Colleague,    Thank you for the opportunity to participate in the care of your patient, Jacques Sharp, at the [facility] HEART [facility]. Please see a copy of my visit note below.    Cardiology Clinic Note  September 27, 2019    Chief complaint: STEMI follow-up    HPI:  Jacques Sharp is a 63 year old with a history of AVNRT s/p ablation in 2013 with post-op inferior STEMI s/p TAMIKA to the RCA and staged PCI of OM 2 in 1/2014 who presented to [facility] on 9/17 with substernal chest pain and was found to have an inferior STEMI. He was taken to the cath lab where he was noted to have a thrombotic occlusion of the RCA s/p stenting of the mid-distal RCA and RPDA, as well as POBA of the RPL. His chest pain resolved immediately post-procedure. His highest recorded troponin was 47. His echo showed EF 55-60% with inferior and posterior wall akinesis. He was discharged home on ASA 81 mg, Plavix 75 mg, lisinopril 5 mg, metoprolol 25 mg BID and atorvastatin 80 mg daily.     Since hospital discharge the patient reports feeling well. He denies recurrent substernal chest pressure reminiscent of his MI. He has not experienced any shortness of breath, orthopnea, PND, leg swelling or weight gain. He reports one episode of lightheadedness 2 days ago following cardiac rehab, he rested and symptoms resolved. He denies associated palpitations or syncope. He has gone on a few short bikes rides, maybe 1-2 miles, without exertional cardiac symptoms. His home blood pressures have been ranging 120/70s. He reports making healthier food choices since his heart attack eating more salads, lean meat and egg whites. He denies smoking or alcohol use. He reports a small right groin hematoma, though the bruising and swelling is improving. He is compliant with his medications including ASA and Plavix.     Past medical history:  Past  Pt wife called stating that he tested positive for COVID today and requested a script to be sent        Paxlovid    [facility] [id number] - P [phone] - F [phone] [[id number]]    Medical History:   Diagnosis Date     Acute MI (H) 12/24/2013     Coronary artery disease      Hyperlipidaemia      Hyperlipidaemia      Paroxysmal supraventricular tachycardia (H)      Medications:  acetaminophen (TYLENOL) 325 MG tablet, Take 1-2 tablets (325-650 mg) by mouth every 4 hours as needed  aspirin (ASA) 81 MG chewable tablet, Take 1 tablet (81 mg) by mouth daily  atorvastatin (LIPITOR) 80 MG tablet, Take 1 tablet (80 mg) by mouth daily  lisinopril (PRINIVIL/ZESTRIL) 5 MG tablet, Take 1 tablet (5 mg) by mouth daily  metoprolol tartrate (LOPRESSOR) 25 MG tablet, Take 1 tablet (25 mg) by mouth 2 times daily  nitroglycerin (NITROSTAT) 0.4 MG SL tablet, Place 1 tablet (0.4 mg) under the tongue every 5 minutes as needed for chest pain  sildenafil (REVATIO) 20 MG tablet, Take 1 tablet (20 mg) by mouth daily as needed (erectile dysfunction)  ticagrelor (BRILINTA) 90 MG tablet, Take 1 tablet (90 mg) by mouth 2 times daily    No current facility-administered medications on file prior to visit.       Allergies:      Allergies   Allergen Reactions     Sulfa Drugs        Family and social history:    Family History   Problem Relation Age of Onset     Hypertension Mother      Cancer Father         lung     Cancer - colorectal No family hx of      Prostate Cancer No family hx of        Social History     Socioeconomic History     Marital status: Single     Spouse name: Not on file     Number of children: Not on file     Years of education: Not on file     Highest education level: Not on file   Occupational History     Not on file   Social Needs     Financial resource strain: Not on file     Food insecurity:     Worry: Not on file     Inability: Not on file     Transportation needs:     Medical: Not on file     Non-medical: Not on file   Tobacco Use     Smoking status: Former Smoker     Smokeless tobacco: Never Used     Tobacco comment: quit 30+ years ago   Substance and Sexual Activity     Alcohol use: No     Drug use:  No     Sexual activity: Yes     Partners: Female   Lifestyle     Physical activity:     Days per week: Not on file     Minutes per session: Not on file     Stress: Not on file   Relationships     Social connections:     Talks on phone: Not on file     Gets together: Not on file     Attends Jew service: Not on file     Active member of club or organization: Not on file     Attends meetings of clubs or organizations: Not on file     Relationship status: Not on file     Intimate partner violence:     Fear of current or ex partner: Not on file     Emotionally abused: Not on file     Physically abused: Not on file     Forced sexual activity: Not on file   Other Topics Concern     Parent/sibling w/ CABG, MI or angioplasty before 65F 55M? Not Asked   Social History Narrative     Not on file         Review of Systems:  Skin: No skin rash or ulcers.  Eyes: No red eye.  Ears/Nose/Throat: No ear discharge, nasal congestion, sore throat or dysphagia.  Respiratory: No cough or hemoptysis.  Cardiovascular: See HPI.    Gastrointestinal: No abdominal pain, nausea, vomiting, hematemesis or melena.  Genitourinary: No increased frequency or urgency of urine. No dysuria or hematuria.  Musculoskeletal: No polyarthralgia or myalgias.  Neurologic: No headaches, seizure or focal weakness.  Psychiatric: No hallucinations.  Hematologic/Lymphatic/Immunologic: No bleeding tendency.  Endocrine: No heat or cold intolerance, abnormal facial hair or alopecia.    Vital signs:  There were no vitals taken for this visit.   Wt Readings from Last 2 Encounters:   09/19/19 75.3 kg (166 lb)   05/08/19 77.6 kg (171 lb)       Physical Exam:  Gen: NAD.    HEENT: No conjunctival pallor or scleral icterus, MMM. Clear oropharynx.    Neck: No JVD. No thyroid enlargement or cervical adenopathy.    Chest: Clear to auscultation bilaterally.    CV: Normal first and second heart sounds. No murmurs or gallop appreciated.    Abdomen: Soft, non-tender,  non-distended, BS+.  Ext: No edema. Warm and well perfused with normal capillary refill. Right groin ecchymosis - 2x2 hematoma at puncture site, right femoral pulse intact, no bruit  Skin: No skin rash or ulcers.  Neuro: alert, oriented and appropriately conversant.    Psych: Normal affect and speech.    Labs:  Last Basic Metabolic Panel:  Lab Results   Component Value Date     09/19/2019      Lab Results   Component Value Date    POTASSIUM 4.4 09/19/2019     Lab Results   Component Value Date    CHLORIDE 108 09/19/2019     Lab Results   Component Value Date    YRIS 8.7 09/19/2019     Lab Results   Component Value Date    CO2 25 09/19/2019     Lab Results   Component Value Date    BUN 16 09/19/2019     Lab Results   Component Value Date    CR 0.80 09/19/2019     Lab Results   Component Value Date    GLC 99 09/19/2019       Lab Results   Component Value Date    WBC 9.9 09/18/2019     Lab Results   Component Value Date    RBC 5.21 09/18/2019     Lab Results   Component Value Date    HGB 13.6 09/18/2019     Lab Results   Component Value Date    HCT 42.1 09/18/2019     Lab Results   Component Value Date    MCV 81 09/18/2019     Lab Results   Component Value Date    MCH 26.1 09/18/2019     Lab Results   Component Value Date    MCHC 32.3 09/18/2019     Lab Results   Component Value Date    RDW 13.3 09/18/2019     Lab Results   Component Value Date     09/18/2019       Lab Results   Component Value Date    INR 1.11 01/30/2014    INR 0.96 08/14/2013         Diagnostics:    Coronary angiogram 9/17/19:    62 yo M with a history of CAD s/p TAMIKA to the dRCA and staged intervention to the OM 2 in 2013, AVNRT s/p ablation which was not successful due to proximity of the circuit to the AVN who presented with an inferior STEMI. He was cutting wood and had substernal chest pain that started about 2:30 pm he drove himself to the clinic presented about an hour later on initial notes diaphoretic and pale 911 activated upon  arrival. Initial ECG showed STEMI EMS was activated   Pre Procedure Diagnosis     STEMI       Conclusion          Mid RCA to Dist RCA lesion is 100% stenosed.    RPDA lesion is 80% stenosed.    3rd RPL lesion is 60% stenosed.    Prox LAD to Mid LAD lesion is 45% stenosed.    Ost Cx to Prox Cx lesion is 25% stenosed.     Impression and plan: Late late stent thrombosis of the distal RCA.     Successful PCI of the distal RCA, ostial PDA using synergy drug-eluting stent and PTCA of the PL branch     Recommendation: Lifelong dual antiplatelet therapy.          Coronary Findings     Diagnostic   Dominance: Right   Left Anterior Descending   Prox LAD to Mid LAD lesion is 45% stenosed.   Left Circumflex   Ost Cx to Prox Cx lesion is 25% stenosed.   Right Coronary Artery   Mid RCA to Dist RCA lesion is 100% stenosed. Culprit lesion. The lesion is type B2 - medium risk, located at the bend, bifurcated and thrombotic. The lesion was previously treated using a drug-eluting stent. Previous treatment took place >2 years ago. A thrombus was formed within the stent. The lesion has in-stent. The stenosis was measured by a visual reading. The strut appears well apposed.   Right Posterior Descending Artery   RPDA lesion is 80% stenosed. Culprit lesion. The lesion is type B2 - medium risk, located at the bend and thrombotic. The lesion was not previously treated. The stenosis was measured by a visual reading.   Right Posterior Atrioventricular Artery   Third Right Posterolateral Branch   3rd RPL lesion is 60% stenosed. Culprit lesion. The lesion is type B1 - medium risk, located at the bend and thrombotic. The stenosis was measured by a visual reading.   Intervention     Mid RCA to Dist RCA lesion   Stent   Lesion length: 30 mm. CATH GUIDING COR LNCHR OD6FR L100CM guide catheter was successful. The guidewire guidewire crosses lesion There is no pre-interventional antegrade distal flow (OSCAR 0). A STENT SYNERGY DRUG ELUTING 3.04S45TB  [id number] drug eluting stent was successfully placed. Pre-stent angioplasty was performed using a CATH BALLOON EMERGE 2.5X12MM [id number] supply. Maximum pressure: 12 petty. Inflation time: 30 sec. . Minimum lumen area: 3.5 mm . The strut is apposed. No post-stent angioplasty was performed. The post-interventional distal flow is normal (OSCAR 3).   There is a 0% residual stenosis post intervention.   RPDA lesion   Stent   Lesion length: 12 mm. CATH GUIDING COR LNCHR OD6FR L100CM guide catheter was successful. The guidewire guidewire crosses lesion There is no pre-interventional antegrade distal flow (OSCAR 0). A STENT SYNERGY DRUG ELUTING 3.67K31WO [id number] drug eluting stent was successfully placed. No pre-stent angioplasty was performed. Minimum lumen area: 3 mm . The post-interventional distal flow is normal (OSCAR 3). The intervention was successful. No complications occurred at this lesion.   There is a 0% residual stenosis post intervention.   3rd RPL lesion   Angioplasty   Angioplasty using a standard balloon was performed independent of stent deployment. CATH GUIDING COR LNCHR OD6FR L100CM guide catheter was successful. The balloon used was a CATH BALLOON EMERGE 2.5X12MM [id number]. There is no pre-interventional antegrade distal flow (OSCAR 0). The post-interventional distal flow is normal (OSCAR 3).   There is a 0% residual stenosis post intervention.     Echocardiogram 9/17/2019:  Interpretation Summary  Left ventricular size is normal.  The Ejection Fraction is estimated at 55-60%.  Inferior wall akinesis is present.  Inferolateral (posterior) wall akinesis is present.  Right ventricular function, chamber size, wall motion, and thickness are  normal.  No significant valve disease.  IVC diameter <2.1 cm collapsing >50% with sniff suggests a normal RA pressure  of 3 mmHg.  The peak velocity of the tricuspid regurgitant jet is not obtainable.  Dilatation of aortic root (3.9 cm, 2.2  cm/m2).     There is no prior study for direct comparison.    Assessment and Plan:     1. CAD: Hx of CAD w/inferior STEMI in 2013 s/p TAMIKA to the dRCA and staged intervention to the OM 2. Presented to [facility]17[facility]19 with chest discomfort, found to have inferior STEMI s/p TAMIKA to the mid-distal RCA, RPDA and POBA of RPL. Highest troponin 47. Echo with EF 55-60% with inferior and posterior wall akinesis. Denies recurrent angina. Compliant with DAPT. Right groin hematoma resolving.   - Continue ASA 81 mg lifelong  - Continue Plavix 75 my daily lifelong as tolerated  - Continue BB, ACEi, high intensity statin  - Would wait 6 months post-MI to interrupt Plavix for foot surgery    2. HLD: At goal, last LDL 64 in September 2019.  - Continue atorvastatin 80 mg daily  - Repeat fasting lipids in 1 year    3. AVNRT: s/p ablation. Denies recurrent palpitations  - Continue beta blocker therapy    4. Erectile dysfunction:   - Advised patient to wait 3 months post STEMI to resume Viagra  - Reminded patient that it can not be used with Nitroglycerin    Follow-up: See Dr. Caballero in 3 months in [place].          Please do not hesitate to contact me if you have any questions/concerns.     Sincerely,     Carlita Mckinney NP

## 2024-10-11 DIAGNOSIS — E78.5 HYPERLIPIDEMIA, UNSPECIFIED HYPERLIPIDEMIA TYPE: ICD-10-CM

## 2024-10-15 RX ORDER — ROSUVASTATIN CALCIUM 10 MG/1
10 TABLET, COATED ORAL
Qty: 90 TABLET | Refills: 3 | Status: SHIPPED | OUTPATIENT
Start: 2024-10-15

## 2024-11-15 ENCOUNTER — OFFICE VISIT (OUTPATIENT)
Age: 69
End: 2024-11-15

## 2024-11-15 VITALS — WEIGHT: 193 LBS | BODY MASS INDEX: 27.63 KG/M2 | HEIGHT: 70 IN

## 2024-11-15 DIAGNOSIS — M79.661 PAIN IN RIGHT LOWER LEG: ICD-10-CM

## 2024-11-15 DIAGNOSIS — M25.551 RIGHT HIP PAIN: ICD-10-CM

## 2024-11-15 DIAGNOSIS — M70.62 TROCHANTERIC BURSITIS OF LEFT HIP: Primary | ICD-10-CM

## 2024-11-15 DIAGNOSIS — M70.61 TROCHANTERIC BURSITIS OF RIGHT HIP: ICD-10-CM

## 2024-11-15 RX ORDER — METHYLPREDNISOLONE 4 MG/1
TABLET ORAL
Qty: 1 KIT | Refills: 0 | Status: SHIPPED | OUTPATIENT
Start: 2024-11-15 | End: 2024-11-21

## 2024-11-15 NOTE — PROGRESS NOTES
States that this is bad when he is up and moving also lying in bed at night.  Has had no injuries or falls.  No fevers or chills.  He takes Celebrex on a daily basis.    Patient denies recent fevers, chills, chest pain, SOB, or injuries.   No recent systemic changes noted.  A 12-point review of systems is performed today.  Pertinent positives are noted.  All other systems reviewed and otherwise are negative.    Physical exam: General: Alert and oriented x3, nad.  well-developed, well nourished.  normal affect, AF.  NC/AT, EOMI, neck supple, trachea midline, no JVD present. Breathing is non-labored.  Examination of the lower extremities reveals decreased range of motion of the right hip with reproduction of groin and thigh discomfort.  He does have some discomfort with straight leg raise right lower extremity as well.  Negative calf tenderness.  Negative Homans.  No signs of DVT present.    Radiographs obtained in the office today 11/15/2024 at the [facility] location including AP pelvis showing moderate advanced arthritis of the right hip with some shadowing of the femoral head.  An AP and lateral lumbar spine are also obtained showing multilevel degenerative changes worst at L4-5 and L5-S1    Assessment: #1 right hip advancing arthritis, #2 lumbar degenerative disc disease, radiculopathy    Plan: At this point, we discussed treatment options.  Will start him on a Medrol Dosepak.  He is instructed on use and precautions.  Will move forward with an MRI of the lumbar spine for his radicular symptomatology as well as an MRI of his right hip to rule out AVN.  Will see him back afterwards for review.    Care plan outlined and precautions discussed. Radiographs and Results were reviewed with the patient.  Medications were reviewed with the patient. All of pt's questions and concerns were addressed.  Increasing symptoms and return precautions associated with chief complaint and evaluation were reviewed with the patient in

## 2024-11-19 ENCOUNTER — HOSPITAL ENCOUNTER (OUTPATIENT)
Facility: HOSPITAL | Age: 69
Discharge: HOME | End: 2024-11-22
Payer: MEDICARE

## 2024-11-19 DIAGNOSIS — M79.661 PAIN IN RIGHT LOWER LEG: ICD-10-CM

## 2024-11-19 DIAGNOSIS — M25.551 RIGHT HIP PAIN: ICD-10-CM

## 2024-11-19 PROCEDURE — 72148 MRI LUMBAR SPINE W/O DYE: CPT

## 2024-11-19 PROCEDURE — 73721 MRI JNT OF LWR EXTRE W/O DYE: CPT

## 2024-11-21 ENCOUNTER — HOSPITAL ENCOUNTER (OUTPATIENT)
Facility: HOSPITAL | Age: 69
Discharge: HOME | End: 2024-11-21
Payer: MEDICARE

## 2024-11-21 ENCOUNTER — APPOINTMENT (OUTPATIENT)
Facility: HOSPITAL | Age: 69
End: 2024-11-21
Payer: MEDICARE

## 2024-11-21 ENCOUNTER — HOSPITAL ENCOUNTER (OUTPATIENT)
Facility: HOSPITAL | Age: 69
End: 2024-11-21
Payer: MEDICARE

## 2024-11-21 DIAGNOSIS — H47.10 UNSPECIFIED PAPILLEDEMA: ICD-10-CM

## 2024-11-21 PROCEDURE — 70543 MRI ORBT/FAC/NCK W/O &W/DYE: CPT

## 2024-11-21 PROCEDURE — 70553 MRI BRAIN STEM W/O & W/DYE: CPT

## 2024-11-21 PROCEDURE — A9577 INJ MULTIHANCE: HCPCS | Performed by: NURSE PRACTITIONER

## 2024-11-21 PROCEDURE — 6360000004 HC RX CONTRAST MEDICATION: Performed by: NURSE PRACTITIONER

## 2024-11-21 RX ADMIN — GADOBENATE DIMEGLUMINE 20 ML: 529 INJECTION, SOLUTION INTRAVENOUS at 16:02

## 2024-11-26 ENCOUNTER — OFFICE VISIT (OUTPATIENT)
Age: 69
End: 2024-11-26
Payer: MEDICARE

## 2024-11-26 DIAGNOSIS — M48.061 SPINAL STENOSIS OF LUMBAR REGION, UNSPECIFIED WHETHER NEUROGENIC CLAUDICATION PRESENT: Primary | ICD-10-CM

## 2024-11-26 DIAGNOSIS — M16.11 PRIMARY OSTEOARTHRITIS OF RIGHT HIP: ICD-10-CM

## 2024-11-26 PROCEDURE — 3017F COLORECTAL CA SCREEN DOC REV: CPT | Performed by: PHYSICIAN ASSISTANT

## 2024-11-26 PROCEDURE — G8428 CUR MEDS NOT DOCUMENT: HCPCS | Performed by: PHYSICIAN ASSISTANT

## 2024-11-26 PROCEDURE — 1036F TOBACCO NON-USER: CPT | Performed by: PHYSICIAN ASSISTANT

## 2024-11-26 PROCEDURE — G8419 CALC BMI OUT NRM PARAM NOF/U: HCPCS | Performed by: PHYSICIAN ASSISTANT

## 2024-11-26 PROCEDURE — G8484 FLU IMMUNIZE NO ADMIN: HCPCS | Performed by: PHYSICIAN ASSISTANT

## 2024-11-26 PROCEDURE — 99214 OFFICE O/P EST MOD 30 MIN: CPT | Performed by: PHYSICIAN ASSISTANT

## 2024-11-26 PROCEDURE — 1123F ACP DISCUSS/DSCN MKR DOCD: CPT | Performed by: PHYSICIAN ASSISTANT

## 2024-11-26 NOTE — PROGRESS NOTES
which is in his groin and travels down his leg.  He has pain in the front of his thigh.  He has pain that goes down the backside of his leg.  Has had no change in bowel or bladder habits.  He is also followed by neurology.  He was started on Neurontin 300 mg a day which does seem to help him at night.    Patient denies recent fevers, chills, chest pain, SOB, or injuries.   No recent systemic changes noted.  A 12-point review of systems is performed today.  Pertinent positives are noted.  All other systems reviewed and otherwise are negative.    Physical exam: General: Alert and oriented x3, nad.  well-developed, well nourished.  normal affect, AF.  NC/AT, EOMI, neck supple, trachea midline, no JVD present. Breathing is non-labored.  Examination of lower extremities reveals pretty well-maintained range of motion of the hips.  On the right side he does have some discomfort which radiates to the knee with internal rotation.  He has discomfort with straight leg raising right extremity.  Neurovascular status intact.  Negative calf tenderness.  Negative Homans.  No signs of DVT present.    Review of MRI right hip:  IMPRESSION:     Moderate degenerative changes of the right hip most pronounced posterior joint  with associated degeneration and posterior labral tear.     No fracture, AVN or joint effusion.     Small partial tear of the right gluteus medius. No trochanteric bursitis.     Bilateral hamstring tendinopathy with partial old tears left greater than right.       Review of MRI lumbar spine:  IMPRESSION:     Similar postsurgical changes L4-5 and interval regression of previously  described disc herniation.     Multilevel degenerative disc and facet joint disease with no more than mild  central canal stenosis at any level.  -Progression of 6 mm intraspinal synovial cyst associated with the left L5-S1  facet with mass effect on the thecal sac and displacement of the intrathecal  left sacral nerve roots.  -Multilevel

## 2024-12-13 ENCOUNTER — HOSPITAL ENCOUNTER (OUTPATIENT)
Facility: HOSPITAL | Age: 69
Discharge: HOME | End: 2024-12-16
Payer: MEDICARE

## 2024-12-13 DIAGNOSIS — M16.11 PRIMARY OSTEOARTHRITIS OF RIGHT HIP: ICD-10-CM

## 2024-12-13 PROCEDURE — 6360000002 HC RX W HCPCS: Performed by: PHYSICIAN ASSISTANT

## 2024-12-13 PROCEDURE — 6360000004 HC RX CONTRAST MEDICATION: Performed by: PHYSICIAN ASSISTANT

## 2024-12-13 PROCEDURE — 77002 NEEDLE LOCALIZATION BY XRAY: CPT

## 2024-12-13 PROCEDURE — 20610 DRAIN/INJ JOINT/BURSA W/O US: CPT

## 2024-12-13 RX ORDER — IOPAMIDOL 408 MG/ML
10 INJECTION, SOLUTION INTRATHECAL
Status: COMPLETED | OUTPATIENT
Start: 2024-12-13 | End: 2024-12-13

## 2024-12-13 RX ORDER — LIDOCAINE HYDROCHLORIDE 10 MG/ML
10 INJECTION, SOLUTION EPIDURAL; INFILTRATION; INTRACAUDAL; PERINEURAL ONCE
Status: COMPLETED | OUTPATIENT
Start: 2024-12-13 | End: 2024-12-13

## 2024-12-13 RX ORDER — TRIAMCINOLONE ACETONIDE 40 MG/ML
40 INJECTION, SUSPENSION INTRA-ARTICULAR; INTRAMUSCULAR ONCE
Status: COMPLETED | OUTPATIENT
Start: 2024-12-13 | End: 2024-12-13

## 2024-12-13 RX ADMIN — TRIAMCINOLONE ACETONIDE 40 MG: 40 INJECTION, SUSPENSION INTRA-ARTICULAR; INTRAMUSCULAR at 10:41

## 2024-12-13 RX ADMIN — IOPAMIDOL 3 ML: 408 INJECTION, SOLUTION INTRATHECAL at 10:39

## 2024-12-13 RX ADMIN — LIDOCAINE HYDROCHLORIDE 10 ML: 10 INJECTION, SOLUTION EPIDURAL; INFILTRATION; INTRACAUDAL; PERINEURAL at 10:36

## 2024-12-17 ENCOUNTER — OFFICE VISIT (OUTPATIENT)
Age: 69
End: 2024-12-17
Payer: MEDICARE

## 2024-12-17 VITALS
OXYGEN SATURATION: 93 % | HEIGHT: 70 IN | WEIGHT: 192 LBS | HEART RATE: 59 BPM | TEMPERATURE: 97 F | BODY MASS INDEX: 27.49 KG/M2

## 2024-12-17 DIAGNOSIS — M54.16 LUMBAR RADICULOPATHY: Primary | ICD-10-CM

## 2024-12-17 DIAGNOSIS — M54.50 LUMBAR PAIN: ICD-10-CM

## 2024-12-17 DIAGNOSIS — Z98.890 S/P LAMINECTOMY: ICD-10-CM

## 2024-12-17 DIAGNOSIS — R20.0 NUMBNESS OF BOTH LOWER EXTREMITIES: ICD-10-CM

## 2024-12-17 DIAGNOSIS — M76.892 TENDONITIS OF LEFT HIP: ICD-10-CM

## 2024-12-17 DIAGNOSIS — M25.552 LEFT HIP PAIN: ICD-10-CM

## 2024-12-17 PROCEDURE — 1036F TOBACCO NON-USER: CPT | Performed by: PHYSICAL MEDICINE & REHABILITATION

## 2024-12-17 PROCEDURE — 1125F AMNT PAIN NOTED PAIN PRSNT: CPT | Performed by: PHYSICAL MEDICINE & REHABILITATION

## 2024-12-17 PROCEDURE — 99214 OFFICE O/P EST MOD 30 MIN: CPT | Performed by: PHYSICAL MEDICINE & REHABILITATION

## 2024-12-17 PROCEDURE — 3017F COLORECTAL CA SCREEN DOC REV: CPT | Performed by: PHYSICAL MEDICINE & REHABILITATION

## 2024-12-17 PROCEDURE — G8484 FLU IMMUNIZE NO ADMIN: HCPCS | Performed by: PHYSICAL MEDICINE & REHABILITATION

## 2024-12-17 PROCEDURE — G8419 CALC BMI OUT NRM PARAM NOF/U: HCPCS | Performed by: PHYSICAL MEDICINE & REHABILITATION

## 2024-12-17 PROCEDURE — 1123F ACP DISCUSS/DSCN MKR DOCD: CPT | Performed by: PHYSICAL MEDICINE & REHABILITATION

## 2024-12-17 PROCEDURE — 1160F RVW MEDS BY RX/DR IN RCRD: CPT | Performed by: PHYSICAL MEDICINE & REHABILITATION

## 2024-12-17 PROCEDURE — G8427 DOCREV CUR MEDS BY ELIG CLIN: HCPCS | Performed by: PHYSICAL MEDICINE & REHABILITATION

## 2024-12-17 PROCEDURE — 1159F MED LIST DOCD IN RCRD: CPT | Performed by: PHYSICAL MEDICINE & REHABILITATION

## 2024-12-17 RX ORDER — GABAPENTIN 300 MG/1
300 CAPSULE ORAL 2 TIMES DAILY
COMMUNITY
Start: 2024-12-01

## 2024-12-17 ASSESSMENT — ENCOUNTER SYMPTOMS
NAUSEA: 0
BACK PAIN: 1
VOMITING: 0
DIARRHEA: 0
TROUBLE SWALLOWING: 0
SHORTNESS OF BREATH: 0

## 2024-12-17 NOTE — PROGRESS NOTES
Cb Reich presents today for   Chief Complaint   Patient presents with    Lower Back Pain    Leg Pain     Right        Is someone accompanying this pt? Yes, spouse    Is the patient using any DME equipment during OV? Yes, rolling walker       Coordination of Care:  1. Have you been to the ER, urgent care clinic since your last visit? Yes, pt went to the ER in November for eye problem.  Hospitalized since your last visit? no    2. Have you seen or consulted any other health care providers outside of the [facility] since your last visit? Yes, ortho Include any pap smears or colon screening. no        
nerve roots by disc osteophyte and facet osteophytes. Less pronounced mild degenerative disc and facet joint disease at other levels without high-grade canal or foraminal stenosis.   Celebrex taken for management of his arthritis and Voltaren taken with improvement to lumbar pain.   He is s/p L4/L5 laminotomy (12/9/19; Dr. Marroquin)  PLAN: advised pt to follow up with Dr. Harmon's office, provided [place] exercises    12/19/23  He c/o left hip pain that radiates to his thigh. He underwent left knee intra-articular injection (10/25/23; Dr. Harmon) with benefit.   Pt also saw Dr. Harmon on 11/29/23 for further eval of left hip pain, and they are ordering left hip MRI to r/o AVN. Left Hip MRI dated 12/15/23 per report revealed no findings of AVN, no significant trochanteric bursitis, and asymmetric, advanced proximal left origin tendinosis/partial thickness tearing.   He also continues with numbness of both feet and shins for last several years.   He applies Voltaren gel with some benefit to left hip pain.   Pt notes that he was advised by Neurology to use cane or walking stick, but has not been consistent with using ambulation device.   Pt's wife notes of an episode of frequent falls in a week, where she suspects he may have injured his hip back then.   PLAN: follow up with Dr. Harmon and Dr. Emilie Vivar AUSTIN Reich is a 69 y.o. male was seen today for follow up. Pt seen by ANGIE Lee 11/26/24 for Lumbar MRI review. Ordered right hip IA injection and referred to spine for repeat DENISHA. Recommend pt increase gabapentin to 300mg BID and advised pt to inform neurology. Pt underwent right hip IA injection 12/13/24 with benefit from pain level of 9 to 3.5. Pt takes Gabapentin BID with minimal benefit. Pt notes of recent fall where he tore some tendons. Pt continues with lumbar pain radiating down the right leg to the bottom of the heel.    Pain Score:   4/10    PmHx: s/p L4-5 laminotomy (12/2019; Dr. Marroquin)

## 2025-01-03 ENCOUNTER — TELEPHONE (OUTPATIENT)
Age: 70
End: 2025-01-03

## 2025-01-03 NOTE — TELEPHONE ENCOUNTER
Patient's spouse called, she states she has left multiple messages for the Injection Coordinator in order for her  to be scheduled for his injection but they have not heard anything back. They are hoping it can be done this month.    Please review and advise patient, [phone]

## 2025-01-06 RX ORDER — OMEPRAZOLE 40 MG/1
40 CAPSULE, DELAYED RELEASE ORAL DAILY
Qty: 90 CAPSULE | Refills: 3 | Status: SHIPPED | OUTPATIENT
Start: 2025-01-06

## 2025-01-06 RX ORDER — AMLODIPINE BESYLATE 5 MG/1
5 TABLET ORAL DAILY
Qty: 90 TABLET | Refills: 3 | Status: SHIPPED | OUTPATIENT
Start: 2025-01-06

## 2025-01-06 RX ORDER — CELECOXIB 200 MG/1
200 CAPSULE ORAL DAILY
Qty: 90 CAPSULE | Refills: 3 | Status: SHIPPED | OUTPATIENT
Start: 2025-01-06

## 2025-01-16 ENCOUNTER — SCHEDULED TELEPHONE ENCOUNTER (OUTPATIENT)
Age: 70
End: 2025-01-16

## 2025-01-16 ENCOUNTER — TELEPHONE (OUTPATIENT)
Age: 70
End: 2025-01-16

## 2025-01-16 DIAGNOSIS — F41.9 ANXIETY DUE TO INVASIVE PROCEDURE: ICD-10-CM

## 2025-01-16 DIAGNOSIS — M54.16 LUMBAR RADICULOPATHY: Primary | ICD-10-CM

## 2025-01-16 DIAGNOSIS — M48.061 SPINAL STENOSIS OF LUMBAR REGION, UNSPECIFIED WHETHER NEUROGENIC CLAUDICATION PRESENT: ICD-10-CM

## 2025-01-16 RX ORDER — DIAZEPAM 5 MG/1
TABLET ORAL
Qty: 1 TABLET | Refills: 0 | Status: SHIPPED | OUTPATIENT
Start: 2025-01-16 | End: 2025-02-15

## 2025-01-16 NOTE — PROGRESS NOTES
Cb Reich is a 69 y.o. male evaluated via telephone on 1/16/2025 for Back Pain  .      History of Present Illness: The patient is a 69-year-old male who sees Dr. Souza.  He has back pain that radiates to his right leg down to his foot.  He was ordered a right L5, S1 selective nerve root block.  He has not been able to have that done yet.  In the meantime he is taking gabapentin 600 mg twice a day through his neurologist which she states does help significantly but he still has the leg pain.  He also takes Celebrex 200 mg.  He denies fever bowel bladder dysfunction.    Physical Exam: Patient is a 69-year-old male who was alert and oriented.  He spoke with fluency.  He did not appear to be in distress.      Assessment/Plan:.  This is a patient who has lumbar radiculopathy and lumbar pain.  He is to undergo a right L5, S1 selective nerve root block.  I will have our  call him to set that up.  We will see him back after the block.  I have sent in the Valium for his block to his pharmacy.  Cb was seen today for back pain.    Diagnoses and all orders for this visit:    Lumbar radiculopathy    Spinal stenosis of lumbar region, unspecified whether neurogenic claudication present    Anxiety due to invasive procedure  -     diazePAM (VALIUM) 5 MG tablet; Take valium to the block suite for the spine injection and give to the nurse there.          Documentation:  I communicated with the patient and/or health care decision maker about back and leg pain.   Details of this discussion including any medical advice provided: Yes    Total Time: 10:53 AM to 11:06 AM   13 minutes    Cb Reich was evaluated through a synchronous (real-time) audio encounter. Patient identification was verified at the start of the visit. He (or guardian if applicable) is aware that this is a billable service, which includes applicable co-pays. This visit was conducted with the patient's (and/or legal guardian's) verbal

## 2025-01-16 NOTE — TELEPHONE ENCOUNTER
Shahzad from [facility] pharmacy is requesting clarification for diazePAM (VALIUM) 5 MG tablet before they can fill this medication    [facility] [id number] [address] - P [phone] - F [phone]     Shahzad from [facility] [phone]

## 2025-01-17 NOTE — TELEPHONE ENCOUNTER
I called the pharmacy back and spoke with Shahzad and she stated the pharmacist went ahead and pushed it through but stated next time the verbiage needs to say \"Take 1 tablet by mouth after instructed by nurse for procedure.\" I verified that we didn't need to do anything and they said that it is ready for  and the pt is aware.   No

## 2025-02-25 NOTE — PROGRESS NOTES
[facility]  [facility], [address]  [place] [address]  Phone: [phone]  Fax: [phone]      Cb Reich  : 1955  PCP: Gary Caballero MD  2025    PROGRESS NOTE    HISTORY OF PRESENT ILLNESS    2018  Seen as a new patient 3/6/18 with c/o right thigh pain that progressively  worsened x 8 months.   Gabapentin taken previously but felt like the \"room was spinning.\"   He decided to not take Lyrica  for this reason, especially since he is caring for and lifting his father-in-law who recently had a stroke.   He is recently retired.   He has had more pain radiating  from his low back radiating into the posterior aspect of his BLE.  Amitriptyline did not provide significant relief  L2-3 interlaminar injection (18;  Dr. Medeiros) provided significant relief. He found  100% relief from the injection (L2-3 interlaminar) for 48 hours, however, he continues to feel about 95% relief.   Gabapentin 300 mg BID tolerated well.   He previously had an arthroscopic  surgery on his L shoulder, but he continues to have some residual pain.  Gabapentin 600mg TID provides significant relief, but if he does not take them on time, he can feel a return of his pain.    Gabapentin 800 mg TID appears to be taking longer to kick in now, so he has considered taking it QID. He sometimes wakes up in the middle of the night due to pain and will need to take one.   L2-3 interlaminar injection  (19; Dr. Medeiros) with significant relief.   Gabapentin 800 mg QID provides significant improvement as well. Amitriptyline 3 x 25 mg QHS was rx'd to take with the Gabapentin.      2019  AMITRIPTYLINE not tolerated well as it caused drowsiness  Gabapentin 1.5 tab (1200 mg)  every 4 hours without benefit. He has also had to take Tylenol \"to take the edge off.\" He c/o low back pain and buttock pain radiating into the posterior aspect of the BLE. He notes his symptoms are worse

## 2025-02-26 ENCOUNTER — OFFICE VISIT (OUTPATIENT)
Age: 70
End: 2025-02-26
Payer: MEDICARE

## 2025-02-26 VITALS
BODY MASS INDEX: 27.75 KG/M2 | WEIGHT: 193.8 LBS | DIASTOLIC BLOOD PRESSURE: 85 MMHG | HEIGHT: 70 IN | HEART RATE: 75 BPM | TEMPERATURE: 97.9 F | SYSTOLIC BLOOD PRESSURE: 124 MMHG | RESPIRATION RATE: 16 BRPM

## 2025-02-26 DIAGNOSIS — M47.816 LUMBAR FACET ARTHROPATHY: ICD-10-CM

## 2025-02-26 DIAGNOSIS — M54.50 LUMBAR PAIN: ICD-10-CM

## 2025-02-26 DIAGNOSIS — M54.16 LUMBAR RADICULOPATHY: Primary | ICD-10-CM

## 2025-02-26 DIAGNOSIS — Z98.890 S/P LAMINECTOMY: ICD-10-CM

## 2025-02-26 PROCEDURE — 3074F SYST BP LT 130 MM HG: CPT | Performed by: PHYSICAL MEDICINE & REHABILITATION

## 2025-02-26 PROCEDURE — 99213 OFFICE O/P EST LOW 20 MIN: CPT | Performed by: PHYSICAL MEDICINE & REHABILITATION

## 2025-02-26 PROCEDURE — G8428 CUR MEDS NOT DOCUMENT: HCPCS | Performed by: PHYSICAL MEDICINE & REHABILITATION

## 2025-02-26 PROCEDURE — 1126F AMNT PAIN NOTED NONE PRSNT: CPT | Performed by: PHYSICAL MEDICINE & REHABILITATION

## 2025-02-26 PROCEDURE — 1123F ACP DISCUSS/DSCN MKR DOCD: CPT | Performed by: PHYSICAL MEDICINE & REHABILITATION

## 2025-02-26 PROCEDURE — 1036F TOBACCO NON-USER: CPT | Performed by: PHYSICAL MEDICINE & REHABILITATION

## 2025-02-26 PROCEDURE — 3079F DIAST BP 80-89 MM HG: CPT | Performed by: PHYSICAL MEDICINE & REHABILITATION

## 2025-02-26 PROCEDURE — G8419 CALC BMI OUT NRM PARAM NOF/U: HCPCS | Performed by: PHYSICAL MEDICINE & REHABILITATION

## 2025-02-26 PROCEDURE — 3017F COLORECTAL CA SCREEN DOC REV: CPT | Performed by: PHYSICAL MEDICINE & REHABILITATION

## 2025-02-26 ASSESSMENT — ENCOUNTER SYMPTOMS
BACK PAIN: 1
VOMITING: 0
DIARRHEA: 0
SHORTNESS OF BREATH: 0
TROUBLE SWALLOWING: 0
NAUSEA: 0

## 2025-05-15 ENCOUNTER — HOSPITAL ENCOUNTER (OUTPATIENT)
Facility: HOSPITAL | Age: 70
Setting detail: SPECIMEN
Discharge: HOME | End: 2025-05-18
Payer: MEDICARE

## 2025-05-15 DIAGNOSIS — Z12.5 SCREENING FOR MALIGNANT NEOPLASM OF PROSTATE: ICD-10-CM

## 2025-05-15 DIAGNOSIS — E78.5 HYPERLIPIDEMIA, UNSPECIFIED HYPERLIPIDEMIA TYPE: ICD-10-CM

## 2025-05-15 DIAGNOSIS — I10 PRIMARY HYPERTENSION: ICD-10-CM

## 2025-05-15 DIAGNOSIS — R73.01 IFG (IMPAIRED FASTING GLUCOSE): ICD-10-CM

## 2025-05-15 LAB
ALBUMIN SERPL-MCNC: 4 G/DL (ref 3.4–5)
ALBUMIN/GLOB SERPL: 1.5 (ref 0.8–1.7)
ALP SERPL-CCNC: 76 U/L (ref 45–117)
ALT SERPL-CCNC: 31 U/L (ref 10–50)
ANION GAP SERPL CALC-SCNC: 12 MMOL/L (ref 3–18)
AST SERPL-CCNC: 24 U/L (ref 10–38)
BASOPHILS # BLD: 0.04 K/UL (ref 0–0.1)
BASOPHILS NFR BLD: 0.6 % (ref 0–2)
BILIRUB SERPL-MCNC: 0.9 MG/DL (ref 0.2–1)
BUN SERPL-MCNC: 18 MG/DL (ref 6–23)
BUN/CREAT SERPL: 18 (ref 12–20)
CALCIUM SERPL-MCNC: 9.9 MG/DL (ref 8.5–10.1)
CHLORIDE SERPL-SCNC: 107 MMOL/L (ref 98–107)
CHOLEST SERPL-MCNC: 117 MG/DL
CO2 SERPL-SCNC: 24 MMOL/L (ref 21–32)
CREAT SERPL-MCNC: 0.99 MG/DL (ref 0.6–1.3)
DIFFERENTIAL METHOD BLD: ABNORMAL
EOSINOPHIL # BLD: 0.2 K/UL (ref 0–0.4)
EOSINOPHIL NFR BLD: 2.8 % (ref 0–5)
ERYTHROCYTE [DISTWIDTH] IN BLOOD BY AUTOMATED COUNT: 12.9 % (ref 11.6–14.5)
GLOBULIN SER CALC-MCNC: 2.7 G/DL (ref 2–4)
GLUCOSE SERPL-MCNC: 108 MG/DL (ref 74–108)
HBA1C MFR BLD: 6.4 % (ref 4.2–5.6)
HCT VFR BLD AUTO: 52.3 % (ref 36–48)
HDLC SERPL-MCNC: 41 MG/DL (ref 40–60)
HDLC SERPL: 2.9 (ref 0–5)
HGB BLD-MCNC: 16.6 G/DL (ref 13–16)
IMM GRANULOCYTES # BLD AUTO: 0.04 K/UL (ref 0–0.04)
IMM GRANULOCYTES NFR BLD AUTO: 0.6 % (ref 0–0.5)
LDLC SERPL CALC-MCNC: 59 MG/DL (ref 0–100)
LYMPHOCYTES # BLD: 2.22 K/UL (ref 0.9–3.6)
LYMPHOCYTES NFR BLD: 30.9 % (ref 21–52)
MCH RBC QN AUTO: 31.5 PG (ref 24–34)
MCHC RBC AUTO-ENTMCNC: 31.7 G/DL (ref 31–37)
MCV RBC AUTO: 99.2 FL (ref 78–100)
MONOCYTES # BLD: 0.63 K/UL (ref 0.05–1.2)
MONOCYTES NFR BLD: 8.8 % (ref 3–10)
NEUTS SEG # BLD: 4.05 K/UL (ref 1.8–8)
NEUTS SEG NFR BLD: 56.3 % (ref 40–73)
NRBC # BLD: 0 K/UL (ref 0–0.01)
NRBC BLD-RTO: 0 PER 100 WBC
PLATELET # BLD AUTO: 262 K/UL (ref 135–420)
PMV BLD AUTO: 9.9 FL (ref 9.2–11.8)
POTASSIUM SERPL-SCNC: 4.8 MMOL/L (ref 3.5–5.5)
PROT SERPL-MCNC: 6.7 G/DL (ref 6.4–8.2)
PSA SERPL-MCNC: 1.5 NG/ML (ref 1.4–4.4)
RBC # BLD AUTO: 5.27 M/UL (ref 4.35–5.65)
SODIUM SERPL-SCNC: 143 MMOL/L (ref 136–145)
TRIGL SERPL-MCNC: 89 MG/DL (ref 0–150)
VLDLC SERPL CALC-MCNC: 18 MG/DL
WBC # BLD AUTO: 7.2 K/UL (ref 4.6–13.2)

## 2025-05-15 PROCEDURE — 80053 COMPREHEN METABOLIC PANEL: CPT

## 2025-05-15 PROCEDURE — 83036 HEMOGLOBIN GLYCOSYLATED A1C: CPT

## 2025-05-15 PROCEDURE — G0103 PSA SCREENING: HCPCS

## 2025-05-15 PROCEDURE — 80061 LIPID PANEL: CPT

## 2025-05-15 PROCEDURE — 85025 COMPLETE CBC W/AUTO DIFF WBC: CPT

## 2025-05-15 PROCEDURE — 36415 COLL VENOUS BLD VENIPUNCTURE: CPT

## 2025-05-19 SDOH — ECONOMIC STABILITY: FOOD INSECURITY: WITHIN THE PAST 12 MONTHS, THE FOOD YOU BOUGHT JUST DIDN'T LAST AND YOU DIDN'T HAVE MONEY TO GET MORE.: NEVER TRUE

## 2025-05-19 SDOH — ECONOMIC STABILITY: INCOME INSECURITY: IN THE LAST 12 MONTHS, WAS THERE A TIME WHEN YOU WERE NOT ABLE TO PAY THE MORTGAGE OR RENT ON TIME?: NO

## 2025-05-19 SDOH — ECONOMIC STABILITY: FOOD INSECURITY: WITHIN THE PAST 12 MONTHS, YOU WORRIED THAT YOUR FOOD WOULD RUN OUT BEFORE YOU GOT MONEY TO BUY MORE.: NEVER TRUE

## 2025-05-19 SDOH — HEALTH STABILITY: PHYSICAL HEALTH: ON AVERAGE, HOW MANY MINUTES DO YOU ENGAGE IN EXERCISE AT THIS LEVEL?: 30 MIN

## 2025-05-19 SDOH — HEALTH STABILITY: PHYSICAL HEALTH: ON AVERAGE, HOW MANY DAYS PER WEEK DO YOU ENGAGE IN MODERATE TO STRENUOUS EXERCISE (LIKE A BRISK WALK)?: 4 DAYS

## 2025-05-19 SDOH — ECONOMIC STABILITY: TRANSPORTATION INSECURITY
IN THE PAST 12 MONTHS, HAS THE LACK OF TRANSPORTATION KEPT YOU FROM MEDICAL APPOINTMENTS OR FROM GETTING MEDICATIONS?: NO

## 2025-05-19 SDOH — ECONOMIC STABILITY: TRANSPORTATION INSECURITY
IN THE PAST 12 MONTHS, HAS LACK OF TRANSPORTATION KEPT YOU FROM MEETINGS, WORK, OR FROM GETTING THINGS NEEDED FOR DAILY LIVING?: NO

## 2025-05-19 ASSESSMENT — LIFESTYLE VARIABLES
HOW MANY STANDARD DRINKS CONTAINING ALCOHOL DO YOU HAVE ON A TYPICAL DAY: 0
HOW OFTEN DO YOU HAVE A DRINK CONTAINING ALCOHOL: 1
HOW OFTEN DO YOU HAVE SIX OR MORE DRINKS ON ONE OCCASION: 1
HOW MANY STANDARD DRINKS CONTAINING ALCOHOL DO YOU HAVE ON A TYPICAL DAY: PATIENT DOES NOT DRINK
HOW OFTEN DO YOU HAVE A DRINK CONTAINING ALCOHOL: NEVER

## 2025-05-19 ASSESSMENT — PATIENT HEALTH QUESTIONNAIRE - PHQ9
2. FEELING DOWN, DEPRESSED OR HOPELESS: NOT AT ALL
SUM OF ALL RESPONSES TO PHQ QUESTIONS 1-9: 0
1. LITTLE INTEREST OR PLEASURE IN DOING THINGS: NOT AT ALL
SUM OF ALL RESPONSES TO PHQ QUESTIONS 1-9: 0

## 2025-05-22 ENCOUNTER — OFFICE VISIT (OUTPATIENT)
Facility: CLINIC | Age: 70
End: 2025-05-22

## 2025-05-22 VITALS
RESPIRATION RATE: 16 BRPM | TEMPERATURE: 98.4 F | HEART RATE: 71 BPM | BODY MASS INDEX: 28.06 KG/M2 | HEIGHT: 70 IN | DIASTOLIC BLOOD PRESSURE: 74 MMHG | WEIGHT: 196 LBS | OXYGEN SATURATION: 96 % | SYSTOLIC BLOOD PRESSURE: 128 MMHG

## 2025-05-22 DIAGNOSIS — I10 PRIMARY HYPERTENSION: ICD-10-CM

## 2025-05-22 DIAGNOSIS — R73.01 IFG (IMPAIRED FASTING GLUCOSE): ICD-10-CM

## 2025-05-22 DIAGNOSIS — Z71.89 ADVANCED CARE PLANNING/COUNSELING DISCUSSION: ICD-10-CM

## 2025-05-22 DIAGNOSIS — I25.10 CORONARY ARTERY DISEASE INVOLVING NATIVE CORONARY ARTERY OF NATIVE HEART WITHOUT ANGINA PECTORIS: ICD-10-CM

## 2025-05-22 DIAGNOSIS — E78.5 HYPERLIPIDEMIA, UNSPECIFIED HYPERLIPIDEMIA TYPE: ICD-10-CM

## 2025-05-22 DIAGNOSIS — G62.9 NEUROPATHY: ICD-10-CM

## 2025-05-22 DIAGNOSIS — G47.33 OSA (OBSTRUCTIVE SLEEP APNEA): ICD-10-CM

## 2025-05-22 DIAGNOSIS — D12.6 COLON ADENOMA: ICD-10-CM

## 2025-05-22 DIAGNOSIS — Z12.5 SCREENING FOR MALIGNANT NEOPLASM OF PROSTATE: ICD-10-CM

## 2025-05-22 DIAGNOSIS — Z00.00 MEDICARE ANNUAL WELLNESS VISIT, SUBSEQUENT: Primary | ICD-10-CM

## 2025-05-23 PROBLEM — G47.33 OSA (OBSTRUCTIVE SLEEP APNEA): Status: ACTIVE | Noted: 2024-01-01

## 2025-05-23 NOTE — PROGRESS NOTES
Medicare Annual Wellness Visit    Cb Reich is here for Medicare AWV    Assessment & Plan   RAFAEL (obstructive sleep apnea)  Advanced care planning/counseling discussion  Coronary artery disease involving native coronary artery of native heart without angina pectoris  Primary hypertension  -     CBC with Auto Differential; Future  Colon adenoma  IFG (impaired fasting glucose)  -     Comprehensive Metabolic Panel; Future  -     HEMOGLOBIN A1C W/O EAG; Future  Neuropathy  Hyperlipidemia, unspecified hyperlipidemia type  -     Lipid Panel; Future  Screening for malignant neoplasm of prostate  -     PSA Screening; Future  Medicare annual wellness visit, subsequent       Return in 1 year (on 5/22/2026) for Medicare AWV.     Subjective       Patient's complete Health Risk Assessment and screening values have been reviewed and are found in Flowsheets. The following problems were reviewed today and where indicated follow up appointments were made and/or referrals ordered.    Positive Risk Factor Screenings with Interventions:    Fall Risk:  Do you feel unsteady or are you worried about falling? : (Patient-Rptd) no  2 or more falls in past year?: (!) (Patient-Rptd) yes  Fall with injury in past year?: (Patient-Rptd) no     Interventions:    Reviewed medications, home hazards, visual acuity, and co-morbidities that can increase risk for falls  Patient declines any further evaluation or treatment                 Hearing Screen:  Do you or your family notice any trouble with your hearing that hasn't been managed with hearing aids?: (!) (Patient-Rptd) Yes    Interventions:  Patient declines any further evaluation or treatment       Advanced Directives:  Do you have a Living Will?: (!) (Patient-Rptd) No    Intervention:  see ACP note           Pt is sexually active          Objective   Vitals:    05/22/25 1345   BP: 128/74   Pulse: 71   Resp: 16   Temp: 98.4 °F (36.9 °C)   TempSrc: Temporal   SpO2: 96%   Weight: 88.9 kg 
Cb Reich presents today for   Chief Complaint   Patient presents with    Medicare AWV       \"Have you been to the ER, urgent care clinic since your last visit?  Hospitalized since your last visit?\"    NO    “Have you seen or consulted any other health care providers [facility] of [facility] since your last visit?”    NO           
cysts on left    Rhinophyma     Sleep apnea 10/2024    Did a sleep study for neurologist     Past Surgical History:   Procedure Laterality Date    BACK SURGERY  2019    Dr Souza    CATARACT EXTRACTION Bilateral     Dr Cormier    COLONOSCOPY N/A 2021    Dr. Hamilton (10/10) divertics; Dr Pereira (21) diverttics and TA/hyperplastic    LUMBAR LAMINECTOMY  2019    Dr Marroquin L4-5 hemilaminectomy and medial facectomy    ORTHOPEDIC SURGERY      Dr. Poole left distal clavicle excision and acromioplasty    UMBILICAL HERNIA REPAIR  2019    Dr Patiño     Social History     Socioeconomic History    Marital status:      Spouse name: Not on file    Number of children: 3    Years of education: Not on file    Highest education level: Not on file   Occupational History    Occupation: ret mgr [employer]   Tobacco Use    Smoking status: Former     Current packs/day: 0.00     Average packs/day: 2.0 packs/day for 30.0 years (60.0 ttl pk-yrs)     Types: Cigarettes     Start date: 1974     Quit date: 2004     Years since quittin.7     Passive exposure: Past    Smokeless tobacco: Never   Vaping Use    Vaping status: Never Used   Substance and Sexual Activity    Alcohol use: No    Drug use: No    Sexual activity: Yes     Partners: Female   Other Topics Concern    Not on file   Social History Narrative    Not on file     Social Drivers of Health     Financial Resource Strain: Low Risk  (2024)    Overall Financial Resource Strain (CARDIA)     Difficulty of Paying Living Expenses: Not hard at all   Food Insecurity: No Food Insecurity (2025)    Hunger Vital Sign     Worried About Running Out of Food in the Last Year: Never true     Ran Out of Food in the Last Year: Never true   Transportation Needs: No Transportation Needs (2025)    PRAPARE - Transportation     Lack of Transportation (Medical): No     Lack of Transportation (Non-Medical): No   Physical Activity: Insufficiently

## 2025-05-23 NOTE — PATIENT INSTRUCTIONS
information.    Personalized Preventive Plan for Cb Reich - 5/22/2025  Medicare offers a range of preventive health benefits. Some of the tests and screenings are paid in full while other may be subject to a deductible, co-insurance, and/or copay.  Some of these benefits include a comprehensive review of your medical history including lifestyle, illnesses that may run in your family, and various assessments and screenings as appropriate.  After reviewing your medical record and screening and assessments performed today your provider may have ordered immunizations, labs, imaging, and/or referrals for you.  A list of these orders (if applicable) as well as your Preventive Care list are included within your After Visit Summary for your review.

## 2025-05-23 NOTE — ACP (ADVANCE CARE PLANNING)
Advance Care Planning     Advance Care Planning (ACP) Physician/NP/PA Conversation    Date of Conversation: 5/22/2025  Conducted with: Patient with Decision Making Capacity    Healthcare Decision Maker:      Primary Decision Maker: Liset Reich - Spouse - [phone]    Click here to complete Healthcare Decision Makers including selection of the Healthcare Decision Maker Relationship (ie \"Primary\")  Today we documented Decision Maker(s) consistent with Legal Next of Kin hierarchy.    Care Preferences:    Hospitalization:  \"If your health worsens and it becomes clear that your chance of recovery is unlikely, what would be your preference regarding hospitalization?\"  The patient would prefer hospitalization.    Ventilation:  \"If you were unable to breath on your own and your chance of recovery was unlikely, what would be your preference about the use of a ventilator (breathing machine) if it was available to you?\"  The patient would desire the use of a ventilator.    Resuscitation:  \"In the event your heart stopped as a result of an underlying serious health condition, would you want attempts made to restart your heart, or would you prefer a natural death?\"  Yes, attempt to resuscitate.    ventilation preferences, hospitalization preferences, and resuscitation preferences    Conversation Outcomes / Follow-Up Plan:  ACP in process - information provided, considering goals and options  Reviewed DNR/DNI and patient elects Full Code (Attempt Resuscitation)    Length of Voluntary ACP Conversation in minutes:  16 minutes    JERONIMO RAO MD

## (undated) DEVICE — WAX SURG 2.5GM HEMSTAT BNE BEESWAX PARAFFIN ISO PALMITATE

## (undated) DEVICE — 3M™ WARMING BLANKET, UPPER BODY, 10 PER CASE, 42268: Brand: BAIR HUGGER™

## (undated) DEVICE — ADHESIVE SKIN CLOSURE 4X22 CM PREMIERPRO EXOFINFUSION DISP

## (undated) DEVICE — (D)BNDG ADHESIVE FABRIC 3/4X3 -- DISC BY MFR USE ITEM 357960

## (undated) DEVICE — INTENDED FOR TISSUE SEPARATION, AND OTHER PROCEDURES THAT REQUIRE A SHARP SURGICAL BLADE TO PUNCTURE OR CUT.: Brand: BARD-PARKER SAFETY BLADES SIZE 20, STERILE

## (undated) DEVICE — OPTIFOAM GENTLE SA, POSTOP, 4X8: Brand: MEDLINE

## (undated) DEVICE — Device

## (undated) DEVICE — SYR 10ML LUER LOK 1/5ML GRAD --

## (undated) DEVICE — CATH IV SAFE STR 22GX1IN BLU -- PROTECTIV PLUS

## (undated) DEVICE — FLEX ADVANTAGE 3000CC: Brand: FLEX ADVANTAGE

## (undated) DEVICE — BLANKET WRM AD W50XL85.8IN PACU FULL BODY FORC AIR

## (undated) DEVICE — NEEDLE HYPO 22GA L1.5IN BLK S STL HUB POLYPR SHLD REG BVL

## (undated) DEVICE — STOCKING ANTIMBLSM KNEE XL REG --

## (undated) DEVICE — SUTURE STRATAFIX SYMMETRIC PDS + ETHIGUARD SZ 1 L18IN ABSRB SXPP1A300

## (undated) DEVICE — SNARE ENDO 2.4MMX230CM -- COLD EXACTO

## (undated) DEVICE — Device: Brand: MEDEX

## (undated) DEVICE — TRAP SPEC COLL POLYP POLYSTYR --

## (undated) DEVICE — WATERPROOF, BACTERIA PROOF DRESSING WITH ABSORBENT SEE THROUGH PAD: Brand: OPSITE POST-OP VISIBLE 15X10CM CTN 20

## (undated) DEVICE — (D)SYR 10ML 1/5ML GRAD NSAF -- PKGING CHANGE USE ITEM 338027

## (undated) DEVICE — JACKSON TABLE POSITIONER KIT: Brand: MEDLINE INDUSTRIES, INC.

## (undated) DEVICE — SPIROMETER INCENT 2500ML W ONE W VLV

## (undated) DEVICE — 3.0MM PRECISION NEURO (MATCH HEAD)

## (undated) DEVICE — SOLUTION IV 1000ML 0.9% SOD CHL

## (undated) DEVICE — AIRLIFE™ ADULT CUSHION NASAL CANNULA 14 FOOT (4.3) CRUSH-RESISTANT OXYGEN TUBING, AND U/CONNECT-IT ADAPTER: Brand: AIRLIFE™

## (undated) DEVICE — DRESSING FOAM DISK DIA1IN H 7MM HYDRPHLC CHG IMPREG IN SL

## (undated) DEVICE — (D)PREP SKN CHLRAPRP APPL 26ML -- CONVERT TO ITEM 371833

## (undated) DEVICE — SUTURE MCRYL SZ 3-0 L27IN ABSRB UD L19MM PS-2 3/8 CIR PRIM Y427H

## (undated) DEVICE — STERILE POLYISOPRENE POWDER-FREE SURGICAL GLOVES: Brand: PROTEXIS

## (undated) DEVICE — REM POLYHESIVE ADULT PATIENT RETURN ELECTRODE: Brand: VALLEYLAB

## (undated) DEVICE — SYSTEM SKIN CLSR 22CM DERMBND PRINEO

## (undated) DEVICE — SET EPI 18GA L3.5IN TUOHY NDL W/ 20GA CLS TIP NYL CATH

## (undated) DEVICE — GARMENT,MEDLINE,DVT,SEQUENTIAL,CALF,MD: Brand: MEDLINE

## (undated) DEVICE — SUTURE STRATAFIX SYMMETRIC PDS + SZ 2-0 L18IN ABSRB VLT SXPP1A403

## (undated) DEVICE — INTENDED FOR TISSUE SEPARATION, AND OTHER PROCEDURES THAT REQUIRE A SHARP SURGICAL BLADE TO PUNCTURE OR CUT.: Brand: BARD-PARKER SAFETY BLADES SIZE 15, STERILE

## (undated) DEVICE — KIT CLN UP BON SECOURS MARYV

## (undated) DEVICE — DRAIN SURG W7MMXL20CM SIL FULL PERF HUBLESS FLAT RADPQ STRP